# Patient Record
Sex: FEMALE | Race: WHITE | Employment: OTHER | URBAN - METROPOLITAN AREA
[De-identification: names, ages, dates, MRNs, and addresses within clinical notes are randomized per-mention and may not be internally consistent; named-entity substitution may affect disease eponyms.]

---

## 2013-03-01 LAB — COLONOSCOPY, EXTERNAL: NORMAL

## 2016-09-27 LAB — MAMMOGRAPHY, EXTERNAL: NORMAL

## 2016-11-17 LAB — AMB DEXA, EXTERNAL: NORMAL

## 2017-01-19 ENCOUNTER — HOSPITAL ENCOUNTER (OUTPATIENT)
Dept: MRI IMAGING | Age: 67
Discharge: HOME | End: 2017-01-19
Attending: ORTHOPAEDIC SURGERY
Payer: MEDICARE

## 2017-01-19 DIAGNOSIS — M75.101 ROTATOR CUFF SYNDROME OF RIGHT SHOULDER: ICD-10-CM

## 2017-01-19 PROCEDURE — 73221 MRI JOINT UPR EXTREM W/O DYE: CPT

## 2017-05-09 DIAGNOSIS — C80.1 CARCINOMA (HCC): ICD-10-CM

## 2017-05-09 DIAGNOSIS — Z90.49 HISTORY OF CHOLECYSTECTOMY: ICD-10-CM

## 2017-08-30 RX ORDER — LOSARTAN POTASSIUM 100 MG/1
TABLET ORAL
Qty: 90 TAB | Refills: 3 | Status: SHIPPED | OUTPATIENT
Start: 2017-08-30 | End: 2018-09-06 | Stop reason: SDUPTHER

## 2017-09-28 ENCOUNTER — HOSPITAL ENCOUNTER (OUTPATIENT)
Dept: MAMMOGRAPHY | Age: 67
Discharge: HOME | End: 2017-09-28
Attending: INTERNAL MEDICINE
Payer: MEDICARE

## 2017-09-28 DIAGNOSIS — Z12.31 VISIT FOR SCREENING MAMMOGRAM: ICD-10-CM

## 2017-09-28 PROCEDURE — 77067 SCR MAMMO BI INCL CAD: CPT

## 2017-10-18 PROBLEM — H65.02 ACUTE SEROUS OTITIS MEDIA OF LEFT EAR: Status: ACTIVE | Noted: 2017-10-18

## 2017-10-18 PROBLEM — N18.9 ANEMIA IN CHRONIC KIDNEY DISEASE: Status: ACTIVE | Noted: 2017-10-18

## 2017-10-18 PROBLEM — M81.0 OSTEOPOROSIS: Status: ACTIVE | Noted: 2017-10-18

## 2017-10-18 PROBLEM — R05.9 COUGH: Status: ACTIVE | Noted: 2017-10-18

## 2017-10-18 PROBLEM — Z90.49 HISTORY OF CHOLECYSTECTOMY: Status: ACTIVE | Noted: 2017-10-18

## 2017-10-18 PROBLEM — L02.229 BOIL OF TRUNK: Status: ACTIVE | Noted: 2017-10-18

## 2017-10-18 PROBLEM — L30.9 ECZEMA: Status: ACTIVE | Noted: 2017-10-18

## 2017-10-18 PROBLEM — R80.9 ABNORMAL PRESENCE OF PROTEIN IN URINE: Status: ACTIVE | Noted: 2017-10-18

## 2017-10-18 PROBLEM — I10 HTN (HYPERTENSION): Status: ACTIVE | Noted: 2017-10-18

## 2017-10-18 PROBLEM — C80.1 CARCINOMA (HCC): Status: ACTIVE | Noted: 2017-10-18

## 2017-10-18 PROBLEM — D63.1 ANEMIA IN CHRONIC KIDNEY DISEASE: Status: ACTIVE | Noted: 2017-10-18

## 2017-10-18 PROBLEM — Z00.00 ANNUAL PHYSICAL EXAM: Status: ACTIVE | Noted: 2017-10-18

## 2017-10-18 PROBLEM — N28.9 ABNORMAL KIDNEY FUNCTION: Status: ACTIVE | Noted: 2017-10-18

## 2017-10-18 PROBLEM — J40 BRONCHITIS: Status: ACTIVE | Noted: 2017-10-18

## 2017-10-18 PROBLEM — R07.9 CHEST PAIN, EXERTIONAL: Status: ACTIVE | Noted: 2017-10-18

## 2017-10-18 RX ORDER — CYCLOBENZAPRINE HCL 5 MG
5 TABLET ORAL
COMMUNITY
End: 2018-11-16

## 2017-10-18 RX ORDER — SILVER SULFADIAZINE 10 G/1000G
CREAM TOPICAL AS NEEDED
COMMUNITY
End: 2021-04-13

## 2017-10-18 RX ORDER — CEPHALEXIN 500 MG/1
500 CAPSULE ORAL 3 TIMES DAILY
COMMUNITY
End: 2018-11-16 | Stop reason: ALTCHOICE

## 2017-10-27 RX ORDER — LEVOTHYROXINE SODIUM 100 UG/1
TABLET ORAL
Qty: 90 TAB | Refills: 0 | Status: SHIPPED | OUTPATIENT
Start: 2017-10-27 | End: 2017-11-16 | Stop reason: ALTCHOICE

## 2017-11-09 ENCOUNTER — LAB ONLY (OUTPATIENT)
Dept: INTERNAL MEDICINE CLINIC | Age: 67
End: 2017-11-09

## 2017-11-09 DIAGNOSIS — Z00.00 ANNUAL PHYSICAL EXAM: Primary | ICD-10-CM

## 2017-11-09 DIAGNOSIS — Z20.5 EXPOSURE TO HEPATITIS C: ICD-10-CM

## 2017-11-09 LAB
ALBUMIN SERPL-MCNC: 4.5 G/DL (ref 3.9–5.4)
ALKALINE PHOS POC: 99 U/L (ref 38–126)
ALT SERPL-CCNC: 24 U/L (ref 9–52)
AST SERPL-CCNC: 22 U/L (ref 14–36)
BACTERIA UA POCT, BACTPOCT: NORMAL
BILIRUB UR QL STRIP: NEGATIVE
BUN BLD-MCNC: 28 MG/DL (ref 7–17)
CALCIUM BLD-MCNC: 10 MG/DL (ref 8.4–10.2)
CASTS UA POCT: 0
CHLORIDE BLD-SCNC: 105 MMOL/L (ref 98–107)
CHOLEST SERPL-MCNC: 132 MG/DL (ref 0–200)
CLUE CELLS, CLUEPOCT: NEGATIVE
CO2 POC: 30 MMOL/L (ref 22–32)
CREAT BLD-MCNC: 1 MG/DL (ref 0.7–1.2)
CRYSTALS UA POCT, CRYSPOCT: NEGATIVE
EGFR (POC): 58.2
EPITHELIAL CELLS POCT: NORMAL
GLUCOSE POC: 113 MG/DL (ref 65–105)
GLUCOSE UR-MCNC: NEGATIVE MG/DL
GRAN# POC: 4.2 K/UL (ref 2–7.8)
GRAN% POC: 67.8 % (ref 37–92)
HBA1C MFR BLD HPLC: 5.9 % (ref 4.5–5.7)
HCT VFR BLD CALC: 33.4 % (ref 37–51)
HDLC SERPL-MCNC: 42 MG/DL (ref 35–130)
HGB BLD-MCNC: 11.8 G/DL (ref 12–18)
KETONES P FAST UR STRIP-MCNC: NEGATIVE MG/DL
LDL CHOLESTEROL POC: 55.6 MG/DL (ref 0–130)
LY# POC: 1.7 K/UL (ref 0.6–4.1)
LY% POC: 28.5 % (ref 10–58.5)
MCH RBC QN: 31.9 PG (ref 26–32)
MCHC RBC-ENTMCNC: 35.2 G/DL (ref 30–36)
MCV RBC: 90 FL (ref 80–97)
MID #, POC: 0.2 K/UL (ref 0–1.8)
MID% POC: 3.7 % (ref 0.1–24)
MUCUS UA POCT, MUCPOCT: NORMAL
PH UR STRIP: 5 [PH] (ref 5–7)
PLATELET # BLD: 162 K/UL (ref 140–440)
POTASSIUM SERPL-SCNC: 4.3 MMOL/L (ref 3.6–5)
PROT SERPL-MCNC: 7.1 G/DL (ref 6.3–8.2)
PROT UR QL STRIP: NEGATIVE
RBC # BLD: 3.69 M/UL (ref 4.2–6.3)
RBC UA POCT, RBCPOCT: NORMAL
SODIUM SERPL-SCNC: 147 MMOL/L (ref 137–145)
SP GR UR STRIP: 1.01 (ref 1.01–1.02)
TCHOL/HDL RATIO (POC): 3.1 (ref 0–4)
TOTAL BILIRUBIN POC: 0.4 MG/DL (ref 0.2–1.3)
TRICH UA POCT, TRICHPOC: NEGATIVE
TRIGL SERPL-MCNC: 172 MG/DL (ref 0–200)
TSH BLD-ACNC: 0.22 UIU/ML (ref 0.4–4.2)
UA UROBILINOGEN AMB POC: NORMAL (ref 0.2–1)
URINALYSIS CLARITY POC: CLEAR
URINALYSIS COLOR POC: NORMAL
URINE BLOOD POC: NEGATIVE
URINE CULT COMMENT, POCT: NORMAL
URINE LEUKOCYTES POC: NORMAL
URINE NITRITES POC: NEGATIVE
VITAMIN D POC: 38.7 NG/ML (ref 30–96)
VLDLC SERPL CALC-MCNC: 34.4 MG/DL
WBC # BLD: 6.1 K/UL (ref 4.1–10.9)
WBC UA POCT, WBCPOCT: NORMAL
YEAST UA POCT, YEASTPOC: NEGATIVE

## 2017-11-10 LAB — HCV AB S/CO SERPL IA: <0.1 S/CO RATIO (ref 0–0.9)

## 2017-11-16 ENCOUNTER — OFFICE VISIT (OUTPATIENT)
Dept: INTERNAL MEDICINE CLINIC | Age: 67
End: 2017-11-16

## 2017-11-16 VITALS
DIASTOLIC BLOOD PRESSURE: 85 MMHG | SYSTOLIC BLOOD PRESSURE: 149 MMHG | HEIGHT: 60 IN | WEIGHT: 160 LBS | TEMPERATURE: 98.2 F | OXYGEN SATURATION: 98 % | HEART RATE: 64 BPM | BODY MASS INDEX: 31.41 KG/M2

## 2017-11-16 DIAGNOSIS — Z23 ENCOUNTER FOR IMMUNIZATION: Primary | ICD-10-CM

## 2017-11-16 RX ORDER — LEVOTHYROXINE SODIUM 88 UG/1
88 TABLET ORAL
Qty: 90 TAB | Refills: 3 | Status: SHIPPED | OUTPATIENT
Start: 2017-11-16 | End: 2018-11-26 | Stop reason: SDUPTHER

## 2017-11-16 NOTE — PATIENT INSTRUCTIONS
Vaccine Information Statement    Influenza (Flu) Vaccine (Live, Intranasal): What you need to know    Many Vaccine Information Statements are available in Khmer and other languages. See www.immunize.org/vis  Hojas de Información Sobre Vacunas están disponibles en Español y en muchos otros idiomas. Visite www.immunize.org/vis    1. Why get vaccinated? Influenza (flu) is a contagious disease that spreads around the [place] every year, usually between October and May. Flu is caused by influenza viruses, and is spread mainly by coughing, sneezing, and close contact. Anyone can get flu. Flu strikes suddenly and can last several days. Symptoms vary by age, but can include:   fever/chills   sore throat   muscle aches   fatigue   cough   headache    runny or stuffy nose    Flu can also lead to pneumonia and blood infections, and cause diarrhea and seizures in children. If you have a medical condition, such as heart or lung disease, flu can make it worse. Flu is more dangerous for some people. Infants and young children, people 72years of age and older, pregnant women, and people with certain health conditions or a weakened immune system are at greatest risk. Each year thousands of people in the [facility] die from flu, and many more are hospitalized. Flu vaccine can:   keep you from getting flu,   make flu less severe if you do get it, and   keep you from spreading flu to your family and other people. 2. Live, attenuated flu vaccine  LAIV, Nasal Anita    A dose of flu vaccine is recommended every flu season. Children younger than 5years of age may need two doses during the same flu season. Everyone else needs only one dose each flu season. The live, attenuated influenza vaccine (called LAIV) may be given to healthy, non-pregnant people 2 through 52years of age. It may safely be given at the same time as other vaccines. LAIV is sprayed into the nose.  LAIV does not contain thimerosal or other preservatives. It is made from weakened flu virus and does not cause flu. There are many flu viruses, and they are always changing. Each year LAIV is made to protect against four viruses that are likely to cause disease in the upcoming flu season. But even when the vaccine doesnt exactly match these viruses, it may still provide some protection. Flu vaccine cannot prevent:   flu that is caused by a virus not covered by the vaccine, or   illnesses that look like flu but are not. It takes about 2 weeks for protection to develop after vaccination, and protection lasts through the flu season. 3. Some people should not get this vaccine    Some people should not get LAIV because of age, health conditions, or other reasons. Most of these people should get an injected flu vaccine instead. Your healthcare provider can help you decide. Tell the provider if you or the person being vaccinated:   have any allergies, including an allergy to eggs, or have ever had an allergic reaction to an influenza vaccine.  have ever had Guillain-Barré Syndrome (also called GBS).  have any long-term heart, breathing, kidney, liver, or nervous system problems.  have asthma or breathing problems, or are a child who has had wheezing episodes.  are pregnant.  are a child or adolescent who is receiving aspirin or aspirin-containing products.  have a weakened immune system.  will be visiting or taking care of someone, within the next 7 days, who requires a protected environment (for example, following a bone marrow transplant)    Sometimes LAIV should be delayed. Tell the provider if you or the person being vaccinated:   are not feeling well. The vaccine could be delayed until you feel better.  have gotten any other vaccines in the past 4 weeks. Live vaccines given too close together might not work as well.  have taken influenza antiviral medication in the past 48 hours.    have a very stuffy nose. 4. Risks of a vaccine reaction    With any medicine, including vaccines, there is a chance of reactions. These are usually mild and go away on their own, but serious reactions are also possible. Most people who get LAIV do not have any problems with it. Reactions to LAIV may resemble a very mild case of flu. Problems that have been reported following LAIV:     Children and adolescents 317 years of age:   runny nose/nasal congestion   cough   fever   headache and muscle aches       wheezing    abdominal pain, vomiting, or diarrhea     Adults 2549 years of age:    runny nose/nasal congestion      sore throat   cough   chills   tiredness/weakness      headache    Problems that could happen after any vaccine:    Any medication can cause a severe allergic reaction. Such reactions from a vaccine are very rare, estimated at about 1 in a million doses, and would happen within a few minutes to a few hours after the vaccination. As with any medicine, there is a very small chance of a vaccine causing a serious injury or death. The safety of vaccines is always being monitored. For more information, visit:   www.cdc.gov/vaccinesafety/    5. What if there is a serious reaction? What should I look for?  Look for anything that concerns you, such as signs of a severe allergic reaction, very high fever, or unusual behavior. Signs of a severe allergic reaction can include hives, swelling of the face and throat, difficulty breathing, a fast heartbeat, dizziness, and weakness. These would start a few minutes to a few hours after the vaccination. What should I do?  If you think it is a severe allergic reaction or other emergency that cant wait, call 9-1-1 and get the person to the nearest hospital. Otherwise, call your doctor.  Reactions should be reported to the Vaccine Adverse Event Reporting System (VAERS).  Your doctor should file this report, or you can do it yourself through the VAERS web site at www.vaers. [facility].gov, or by calling 3-728.926.4217. VAERS does not give medical advice. 6. The National Vaccine Injury Compensation Program    The [facility] Vaccine Injury Compensation Program (VICP) is a federal program that was created to compensate people who may have been injured by certain vaccines. Persons who believe they may have been injured by a vaccine can learn about the program and about filing a claim by calling 3-726.699.3734 or visiting the [facility] website at www.[facility].gov/vaccinecompensation. There is a time limit to file a claim for compensation. 7. How can I learn more?  Ask your doctor. He or she can give you the vaccine package insert or suggest other sources of information.  Call your local or state health department.  Contact the Centers for Disease Control and Prevention (CDC):  - Call 5-375.501.7035 (1-800-CDC-INFO) or  - Visit CDCs website at www.cdc.gov/flu    Vaccine Information Statement   Live Attenuated Influenza Vaccine   8/7/2015  [address] [id number]    Department of Health and Human Services  Centers for Disease Control and Prevention    Office Use Only

## 2017-11-16 NOTE — PROGRESS NOTES
Ms. eDn Berry comes to the office today for a comprehensive medical evaluation. Past Medical History:  1. Hypertension. 2. Hypothyroidism, treated. 3. Type 2 diabetes. 4. Stage 3 CKD. 5. Obesity, status post sleeve gastrectomy in .  6. Status post bilateral cataract extractions, 2016, Dr. Charisse Tellez. 7. Arteriosclerotic heart disease. CT scan of the heart in  showed a coronary artery calcium score of 158.  8. Status post open cholecystectomy. 9. Status post colonoscopic polypectomy. 10. Nonallergic rhinitis. 11. Basal cell carcinoma of the tip of the nose, status post excision. 12. G2, P2, AB0. 13. Mixed hyperlipidemia. 14. Chronic low back pain with sciatica. 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 16. Sleep apnea, not treated. 17. Mild osteopenia. 18. History of renal calculi. 19. Mild normocytic, normochromic anemia. Current Medications:  1. Synthroid 0.1 mg daily. 2. Gabapentin 300 mg t.i.d.  3. Losartan 100 mg daily. 4. Crestor 20 mg daily. 5. Zyrtec 10 mg daily. 6. Flonase, two sprays daily. 7. Flexeril 5 mg prn.  8. Centrum Silver, one daily. 9. Citracal D, one daily. 10. Vitamin B12 1,000 mcg daily. 11. Vitamin D 5,000 units daily. Drug Allergies:    1. Sulfa. 2. She is intolerant of niacin. 3. Tricor caused abdominal pain. Social History:  She is a retired . She is . She helps her  with a [employer] business. She is a lifelong nonsmoker and non drinker. She has two grandchildren, age 1 and age 11, with autism and she helps provide  for them. Family History:  Her mother  at age 79 from a ruptured abdominal aortic aneurysm and lung cancer. Her father  of end stage renal disease and stroke at age 77. She has a brother who is alive and well. Review of Systems:  She is struggling hard to maintain her weight, but seems to be successful.   She is stressed from daily routine with her grandchildren. She otherwise feels well. She denies chest pain, chest pressure, chest tightness, shortness of breath, PND, orthopnea or ankle edema. Remainder of the ROS is negative. Physical Examination:  GENERAL:  HT: 5'.  WT: 160. BMI: 31.4. BP: 149/85 initially, subsequently 118/66. P: 64 and regular. O2 saturation on room air: 98%. HEENT:  Head normocephalic, atraumatic. Eyes - pupils midrange, equal directly and consensually. Extraocular movements are normal.  Ears, nose and throat are normal.  NECK:  Without JVD, adenopathy, thyromegaly or carotid bruits. LUNGS:  Clear. BREASTS:  Without masses. HEART:   Quiet precordium. Regular rate and rhythm. No audible murmurs or gallops. ABDOMEN:  Soft, non tender. No detectable hepatosplenomegaly, abdominal masses or bruits. EXTREMITIES:  Without edema. Pulses are normal.  Sensation to fine touch is normal.  SKIN:  No suspicious lesions. NEUROLOGICAL:  Cranial nerves II-XII are intact. Strength, gait and mental status are normal for age. Studies:  PFT is normal.  Resting EKG within normal limits. Hearing testing normal.    Laboratory:  Hemoglobin is slightly low at 11.8. WBC and platelet count are normal.  Blood sugar 113, Hgb A1c 5.9. Potassium, renal function, calcium level and LFTs normal.  UA normal.  TSH 0.22. Vitamin D level 39. Hep C antibody negative. Impression:  1. Hypertension, good control. 2. Type 2 diabetes, currently diet controlled and basically resolved with sleeve gastrectomy. 3. Hypothyroidism, treated. 4. Stage 3 CKD. 5. Status post bilateral cataract extractions. 6. Arteriosclerotic heart disease as outlined. 7. Status post cholecystectomy. 8. Status post colonoscopic polypectomy. 9. Sleep apnea. 10. Mild normocytic normochromic anemia. Plan:  1. Flu vaccine given today. 2. Tetanus, whooping cough, Pneumovax 23 and Prevnar 13 and shingles vaccines are current.   3. Mammogram was normal in September. 4. She had a stress test in December, 2015 prior to her surgery. 5. I'd like her to have her CT heart scan done again next year. 6. Next colonoscopy due in 2018. 7. Next bone density test in November, 2018. 8. Eye exam is current. 9. I have asked her to return in six months for recheck. 10. Lowered her dose of Synthroid to 0.088 mg daily.

## 2017-11-16 NOTE — PROGRESS NOTES
Reviewed record in preparation for visit and have obtained necessary documentation. Identified pt with two pt identifiers(name and ). Chief Complaint   Patient presents with    Annual Exam        Coordination of Care Questionnaire:  :   o   1) Have you been to an emergency room, urgent care clinic since your last visit? No     Hospitalized since your last visit? No         2) Have you seen or consulted any other health care providers outside of [address] [place] [address] since your last visit?   Dr. Adriana Jennings

## 2017-11-16 NOTE — PROGRESS NOTES
Sam Sarmiento  presented to office today and received an injection of Fluzone  per Dr. Etta Simms orders. Pt was given 0.5 mL of High Dose IM in the Lt Deltoid without incident. Pt waited and no adverse reaction was observed.

## 2017-11-16 NOTE — LETTER
11/16/2017 12:45 PM 
 
Ms. Ean Fair [address]  [address] [id number] Dear Werner Greenberg: 
 
I am writing this letter as a follow-up to your recent physical examination. I have enclosed copies of your lab work for your review. After going over this information, if you have any questions please give me a call. Your current active and past medical problems include: 1. Hypertension. 2. Hypothyroidism. 3. Type 2 diabetes, which is currently diet controlled. 4. You have lost 80 pounds since you had a sleeve gastrectomy in March, 2016. 
5. Arteriosclerotic heart disease. CT scan of the heart done in November, 2013 showed a coronary artery calcium score of 158, indicating moderate nonobstructive coronary artery plaque. You had a normal stress test in December, 2015, just prior to your surgery. I would like to repeat the CT scan of your heart next November at your fifth year anniversary. 6. You have had your gallbladder removed. 7. You have had colon polyps. 8. You had a basal cell skin cancer removed from the tip of your nose. 9. Hypercholesterolemia. 10. You have had bilateral cataract extractions. 11. Sleep apnea. 12. Mild anemia, cause undetermined. 15. You had a right rotator cuff repair in June of this year. Your current medications are: 1. Synthroid 0.088 mg daily. 2. Gabapentin 300 mg three times daily. 3. Losartan 100 mg daily. 4. Crestor 20 mg daily. 5. Flonase, two sprays daily. 6. Zyrtec 10 mg daily. 7. Flexeril 5 mg as needed for back pain. 8. Centrum Silver, one daily. 9. Citracal D, one daily. 10. Vitamin B12 1,000 mcg daily. 11. Vitamin D 5,000 units daily. On physical examination, your height was 5'. Your weight was 160. BMI 31.4. Your blood pressure was 118/66. Your pulse was 64 and regular. Your physical examination was entirely normal.  You have only gained 4 pounds since your checkup a year ago.   Your resting electrocardiogram, pulmonary function testing and hearing testing were normal.  
 
You have a mild anemia. Your hemoglobin is 11.8, normal is 12-18. This is about the same as it was a year ago. Platelet count and white blood count are normal.  Your blood sugar is 113 and hemoglobin A1c is 5.9. That is excellent. Your creatinine, which is a measure of kidney function, is now normal at 1.0. Potassium, calcium level and liver function studies were normal.   Total cholesterol was 132, triglycerides were 172, LDL (bad cholesterol) was 56 and HDL (good cholesterol) was 42. That is a perfect lipid profile. TSH, which is a measure of thyroid function, is a little bit low at 0.22, indicating your current replacement dose is too strong and I have made an adjustment. Vitamin D level is normal at 39. We checked you for the hepatitis C virus and that was negative. Your urinalysis is normal. 
 
We updated your influenza vaccine. You have had Pneumovax 23, Prevnar 13, tetanus, whooping cough and shingles. Your eye exam is current. You had a normal mammogram in September. Your colonoscopy is due in March, 2018. You had a relatively normal bone density test last November. We will repeat that again in 2-3 years. I cannot tell you how happy I am with the progress you have made over the last 18 months. You are to be congratulated. I look forward to seeing you again in six months or sooner should the need arise. Sincerely, Cristy Castellanos MD

## 2017-11-24 ENCOUNTER — TELEPHONE (OUTPATIENT)
Dept: INTERNAL MEDICINE CLINIC | Age: 67
End: 2017-11-24

## 2017-11-24 RX ORDER — AZITHROMYCIN 250 MG/1
250 TABLET, FILM COATED ORAL SEE ADMIN INSTRUCTIONS
Qty: 6 TAB | Refills: 0 | Status: SHIPPED | OUTPATIENT
Start: 2017-11-24 | End: 2017-11-29

## 2017-11-24 NOTE — TELEPHONE ENCOUNTER
Patient's  called stating that Ms Megan Malone is having congestion and a cough and no fever. Per Dr Danny Cortez patient advised that he will send a Lenor Katz and she can take OTC Robitussin DM. Rx sent to [address]

## 2018-01-29 DIAGNOSIS — Z20.828 EXPOSURE TO INFLUENZA: Primary | ICD-10-CM

## 2018-01-29 RX ORDER — OSELTAMIVIR PHOSPHATE 75 MG/1
75 CAPSULE ORAL DAILY
Qty: 10 CAP | Refills: 0 | Status: SHIPPED | OUTPATIENT
Start: 2018-01-29 | End: 2018-02-03

## 2018-03-12 DIAGNOSIS — J01.90 ACUTE NON-RECURRENT SINUSITIS, UNSPECIFIED LOCATION: Primary | ICD-10-CM

## 2018-03-12 RX ORDER — AMOXICILLIN AND CLAVULANATE POTASSIUM 500; 125 MG/1; MG/1
1 TABLET, FILM COATED ORAL 2 TIMES DAILY
Qty: 20 TAB | Refills: 0 | Status: SHIPPED | OUTPATIENT
Start: 2018-03-12 | End: 2018-05-17 | Stop reason: ALTCHOICE

## 2018-03-30 ENCOUNTER — OFFICE VISIT (OUTPATIENT)
Dept: INTERNAL MEDICINE CLINIC | Age: 68
End: 2018-03-30

## 2018-03-30 VITALS
SYSTOLIC BLOOD PRESSURE: 152 MMHG | OXYGEN SATURATION: 95 % | BODY MASS INDEX: 31.84 KG/M2 | TEMPERATURE: 98.2 F | WEIGHT: 162.2 LBS | HEIGHT: 60 IN | HEART RATE: 75 BPM | DIASTOLIC BLOOD PRESSURE: 83 MMHG

## 2018-03-30 DIAGNOSIS — J01.41 ACUTE RECURRENT PANSINUSITIS: Primary | ICD-10-CM

## 2018-03-30 DIAGNOSIS — H10.31 ACUTE BACTERIAL CONJUNCTIVITIS OF RIGHT EYE: ICD-10-CM

## 2018-03-30 PROBLEM — J01.40 ACUTE PANSINUSITIS: Status: ACTIVE | Noted: 2018-03-30

## 2018-03-30 PROBLEM — H10.30 ACUTE BACTERIAL CONJUNCTIVITIS: Status: ACTIVE | Noted: 2018-03-30

## 2018-03-30 RX ORDER — CIPROFLOXACIN HYDROCHLORIDE 3.5 MG/ML
2 SOLUTION/ DROPS TOPICAL 4 TIMES DAILY
Qty: 10 ML | Refills: 0 | Status: SHIPPED | OUTPATIENT
Start: 2018-03-30 | End: 2018-11-16 | Stop reason: ALTCHOICE

## 2018-03-30 RX ORDER — LEVOFLOXACIN 750 MG/1
750 TABLET ORAL DAILY
Qty: 5 TAB | Refills: 0 | Status: SHIPPED | OUTPATIENT
Start: 2018-03-30 | End: 2018-11-16 | Stop reason: ALTCHOICE

## 2018-03-30 NOTE — PROGRESS NOTES
Reviewed record in preparation for visit and have obtained necessary documentation. Identified pt with two pt identifiers(name and ). Chief Complaint   Patient presents with    Sinus Infection    Cough        Coordination of Care Questionnaire:  :     1) Have you been to an emergency room, urgent care clinic since your last visit? No     Hospitalized since your last visit? No               2) Have you seen or consulted any other health care providers outside of [address] [place] [address] since your last visit? Yes Dr Jose Rafael Bell, Dr Nancy Alba, and Dr Addis Yeboah.

## 2018-03-30 NOTE — PROGRESS NOTES
Subjective:  Abby Lilly comes to the office today complaining of cough, sinus drainage and irritation in her right eye. Her grandchild also has  pink eye. Her cough is productive. She hasn't had fever or chills. I phoned in a rx for Augmentin on March 12th. She said that helped with the same symptoms, but now they have recurred. Physical Examination:  GENERAL:  T: 98.2. BP: 152/83 initially, subsequently 130/86. P: 75 and regular. O2 sat on room air: 95%. HEENT:  Ears are clear. Right eye reveals purulent conjunctivitis. Nose - swollen turbinates, mucopurulent drainage. Throat is normal.  NECK:  Without adenopathy or stridor. CHEST:  Lungs clear. CARDIAC:  Heart regular rhythm without murmurs or gallops. Impression:  1. Recurrent sinusitis  2. Bacterial conjunctivitis. Plan:  1. Try Levaquin 750, (#5), one daily. 2. Saline nasal spray to the nose q.i.d.  3. She has some cough syrup. 4. Follow up if unimproved, otherwise as previously scheduled.

## 2018-04-10 ENCOUNTER — ANESTHESIA (OUTPATIENT)
Dept: ENDOSCOPY | Age: 68
End: 2018-04-10
Payer: MEDICARE

## 2018-04-10 ENCOUNTER — HOSPITAL ENCOUNTER (OUTPATIENT)
Age: 68
Setting detail: OUTPATIENT SURGERY
Discharge: HOME | End: 2018-04-10
Attending: COLON & RECTAL SURGERY | Admitting: COLON & RECTAL SURGERY
Payer: MEDICARE

## 2018-04-10 ENCOUNTER — ANESTHESIA EVENT (OUTPATIENT)
Dept: ENDOSCOPY | Age: 68
End: 2018-04-10
Payer: MEDICARE

## 2018-04-10 VITALS
TEMPERATURE: 98 F | WEIGHT: 161.44 LBS | RESPIRATION RATE: 7 BRPM | SYSTOLIC BLOOD PRESSURE: 120 MMHG | HEART RATE: 58 BPM | OXYGEN SATURATION: 91 % | BODY MASS INDEX: 29.71 KG/M2 | DIASTOLIC BLOOD PRESSURE: 68 MMHG | HEIGHT: 62 IN

## 2018-04-10 PROCEDURE — 74011250636 HC RX REV CODE- 250/636

## 2018-04-10 PROCEDURE — 76040000019: Performed by: COLON & RECTAL SURGERY

## 2018-04-10 PROCEDURE — 74011250636 HC RX REV CODE- 250/636: Performed by: COLON & RECTAL SURGERY

## 2018-04-10 PROCEDURE — 74011000250 HC RX REV CODE- 250

## 2018-04-10 PROCEDURE — 76060000031 HC ANESTHESIA FIRST 0.5 HR: Performed by: COLON & RECTAL SURGERY

## 2018-04-10 RX ORDER — NALOXONE HYDROCHLORIDE 0.4 MG/ML
0.4 INJECTION, SOLUTION INTRAMUSCULAR; INTRAVENOUS; SUBCUTANEOUS
Status: DISCONTINUED | OUTPATIENT
Start: 2018-04-10 | End: 2018-04-10 | Stop reason: HOSPADM

## 2018-04-10 RX ORDER — MIDAZOLAM HYDROCHLORIDE 1 MG/ML
1-2 INJECTION, SOLUTION INTRAMUSCULAR; INTRAVENOUS
Status: DISCONTINUED | OUTPATIENT
Start: 2018-04-10 | End: 2018-04-10 | Stop reason: HOSPADM

## 2018-04-10 RX ORDER — FLUMAZENIL 0.1 MG/ML
0.2 INJECTION INTRAVENOUS
Status: DISCONTINUED | OUTPATIENT
Start: 2018-04-10 | End: 2018-04-10 | Stop reason: HOSPADM

## 2018-04-10 RX ORDER — MICONAZOLE NITRATE 2 %
CREAM WITH APPLICATOR VAGINAL 2 TIMES DAILY
COMMUNITY

## 2018-04-10 RX ORDER — SODIUM CHLORIDE 9 MG/ML
50 INJECTION, SOLUTION INTRAVENOUS CONTINUOUS
Status: DISCONTINUED | OUTPATIENT
Start: 2018-04-10 | End: 2018-04-10 | Stop reason: HOSPADM

## 2018-04-10 RX ORDER — ATROPINE SULFATE 0.1 MG/ML
0.5 INJECTION INTRAVENOUS
Status: DISCONTINUED | OUTPATIENT
Start: 2018-04-10 | End: 2018-04-10 | Stop reason: HOSPADM

## 2018-04-10 RX ORDER — DEXTROMETHORPHAN/PSEUDOEPHED 2.5-7.5/.8
1.2 DROPS ORAL
Status: DISCONTINUED | OUTPATIENT
Start: 2018-04-10 | End: 2018-04-10 | Stop reason: HOSPADM

## 2018-04-10 RX ORDER — SODIUM CHLORIDE 0.9 % (FLUSH) 0.9 %
5-10 SYRINGE (ML) INJECTION AS NEEDED
Status: DISCONTINUED | OUTPATIENT
Start: 2018-04-10 | End: 2018-04-10 | Stop reason: HOSPADM

## 2018-04-10 RX ORDER — PROPOFOL 10 MG/ML
INJECTION, EMULSION INTRAVENOUS AS NEEDED
Status: DISCONTINUED | OUTPATIENT
Start: 2018-04-10 | End: 2018-04-10 | Stop reason: HOSPADM

## 2018-04-10 RX ORDER — EPINEPHRINE 0.1 MG/ML
1 INJECTION INTRACARDIAC; INTRAVENOUS
Status: DISCONTINUED | OUTPATIENT
Start: 2018-04-10 | End: 2018-04-10 | Stop reason: HOSPADM

## 2018-04-10 RX ORDER — FENTANYL CITRATE 50 UG/ML
25 INJECTION, SOLUTION INTRAMUSCULAR; INTRAVENOUS
Status: DISCONTINUED | OUTPATIENT
Start: 2018-04-10 | End: 2018-04-10 | Stop reason: HOSPADM

## 2018-04-10 RX ORDER — SODIUM CHLORIDE 0.9 % (FLUSH) 0.9 %
5-10 SYRINGE (ML) INJECTION EVERY 8 HOURS
Status: DISCONTINUED | OUTPATIENT
Start: 2018-04-10 | End: 2018-04-10 | Stop reason: HOSPADM

## 2018-04-10 RX ORDER — LIDOCAINE HYDROCHLORIDE 20 MG/ML
INJECTION, SOLUTION EPIDURAL; INFILTRATION; INTRACAUDAL; PERINEURAL AS NEEDED
Status: DISCONTINUED | OUTPATIENT
Start: 2018-04-10 | End: 2018-04-10 | Stop reason: HOSPADM

## 2018-04-10 RX ADMIN — LIDOCAINE HYDROCHLORIDE 50 MG: 20 INJECTION, SOLUTION EPIDURAL; INFILTRATION; INTRACAUDAL; PERINEURAL at 13:33

## 2018-04-10 RX ADMIN — PROPOFOL 250 MG: 10 INJECTION, EMULSION INTRAVENOUS at 13:52

## 2018-04-10 RX ADMIN — SODIUM CHLORIDE 50 ML/HR: 900 INJECTION, SOLUTION INTRAVENOUS at 13:03

## 2018-04-10 NOTE — IP AVS SNAPSHOT
[address] [facility] 
[phone] Patient: Justine Florian MRN: [id number] About your hospitalization You were admitted on:  April 10, 2018 You last received care in the:  [facility] ENDOSCOPY You were discharged on:  April 10, 2018 Why you were hospitalized Your primary diagnosis was:  Not on File Follow-up Information Follow up With Details Comments Contact Info MD Lorenzo Cannon [address] [facility] 
[phone] Your Scheduled Appointments Tuesday May 15, 2018  9:00 AM EDT FOLLOW UP 10 with MD KALEN Cannon [facility] (Arthur Patel) Lorenzo [address] [address] [phone] Discharge Orders None A check elza indicates which time of day the medication should be taken. My Medications CONTINUE taking these medications Instructions Each Dose to Equal  
 Morning Noon Evening Bedtime ACCU-CHEK LEILA PLUS TEST STRP strip Generic drug:  glucose blood VI test strips Your last dose was: Your next dose is:    
   
   
 USE TO TEST TWICE DAILY  
     
   
   
   
  
 amoxicillin-clavulanate 500-125 mg per tablet Commonly known as:  AUGMENTIN Your last dose was: Your next dose is: Take 1 Tab by mouth two (2) times a day. 1 Tab  
    
   
   
   
  
 atenolol 25 mg tablet Commonly known as:  TENORMIN Your last dose was: Your next dose is: TAKE 1 TABLET DAILY Biotin 2,500 mcg Cap Your last dose was: Your next dose is: Take  by mouth. CALTRATE PLUS PO Your last dose was: Your next dose is: Take  by mouth daily.   
     
   
   
   
  
 CENTRUM COMPLETE PO  
 Your last dose was: Your next dose is: Take  by mouth daily. cholecalciferol (VITAMIN D3) 5,000 unit Tab tablet Commonly known as:  VITAMIN D3 Your last dose was: Your next dose is: Take  by mouth daily. ciprofloxacin HCl 0.3 % ophthalmic solution Commonly known as:  Flex Zimmer Your last dose was: Your next dose is:    
   
   
 Administer 2 Drops to right eye four (4) times daily. 2 Drop Citracal + D tablet Generic drug:  calcium citrate-vitamin D3 Your last dose was: Your next dose is: Take  by mouth two (2) times a day. cyclobenzaprine 5 mg tablet Commonly known as:  FLEXERIL Your last dose was: Your next dose is: Take 5 mg by mouth three (3) times daily as needed for Muscle Spasm(s). 5 mg EVOXAC 30 mg capsule Generic drug:  cevimeline Your last dose was: Your next dose is: Take 30 mg by mouth daily. 30 mg  
    
   
   
   
  
 FLONASE 50 mcg/actuation nasal spray Generic drug:  fluticasone Your last dose was: Your next dose is:    
   
   
 nightly.  
     
   
   
   
  
 gabapentin 300 mg capsule Commonly known as:  NEURONTIN Your last dose was: Your next dose is: Take 300 mg by mouth nightly. 300 mg HumaLOG Mix 75-25(U-100)Insuln 100 unit/mL (75-25) injection Generic drug:  insulin lispro protamine/insulin lispro Your last dose was: Your next dose is:    
   
   
 by SubCUTAneous route Before breakfast and dinner. KEFLEX 500 mg capsule Generic drug:  cephALEXin Your last dose was: Your next dose is: Take 500 mg by mouth three (3) times daily.   
 500 mg  
    
   
   
   
  
 levoFLOXacin 750 mg tablet Commonly known as:  Doyle Myers Your last dose was: Your next dose is: Take 1 Tab by mouth daily. 750 mg  
    
   
   
   
  
 levothyroxine 88 mcg tablet Commonly known as:  SYNTHROID Your last dose was: Your next dose is: Take 1 Tab by mouth Daily (before breakfast). 88 mcg  
    
   
   
   
  
 losartan 100 mg tablet Commonly known as:  COZAAR Your last dose was: Your next dose is: TAKE 1 TABLET DAILY PROBIOTIC 4X 10-15 mg Tbec Generic drug:  B.infantis-B.ani-B.long-B.bifi Your last dose was: Your next dose is: Take  by mouth. rosuvastatin 20 mg tablet Commonly known as:  CRESTOR Your last dose was: Your next dose is: TAKE 1 TABLET DAILY SILVADENE 1 % topical cream  
Generic drug:  silver sulfADIAZINE Your last dose was: Your next dose is:    
   
   
 Apply  to affected area daily. VITAMIN B-12 PO Your last dose was: Your next dose is: Take 1,000 mcg by mouth daily. 1000 mcg ZYRTEC PO Your last dose was: Your next dose is: Take  by mouth daily. Discharge Instructions Ethan Varela [id number] 
1950 COLON DISCHARGE INSTRUCTIONS Discomfort: 
Redness at IV site- apply warm compress to area; if redness or soreness persist- contact your physician There may be a slight amount of blood passed from the rectum Gaseous discomfort- walking, belching will help relieve any discomfort You may not operate a vehicle for 12 hours You may not engage in an occupation involving machinery or appliances for rest of today You may not drink alcoholic beverages for at least 12 hours Avoid making any critical decisions for at least 24 hour DIET: 
 High fiber diet.  however -  remember your colon is empty and a heavy meal will produce gas. Avoid these foods:  vegetables, fried / greasy foods, carbonated drinks for today MEDICATIONS: 
resume ACTIVITY: 
You may resume your normal daily activities it is recommended that you spend the remainder of the day resting -  avoid any strenuous activity. CALL M.D. ANY SIGN OF: Increasing pain, nausea, vomiting Abdominal distension (swelling) New increased bleeding (oral or rectal) Fever (chills) Follow-up Instructions: 
 Call Madeline Massey MD if any questions or problems. Telephone # [phone] Should have a repeat colonoscopy in 5 years. COLONOSCOPY FINDINGS: 
Your colonoscopy showed: sigmoid diverticulosis. [email or web address] Activation Thank you for requesting access to [email or web address]. Please follow the instructions below to securely access and download your online medical record. [email or web address] allows you to send messages to your doctor, view your test results, renew your prescriptions, schedule appointments, and more. How Do I Sign Up? 1. In your internet browser, go to [email or web address] 
2. Click on the First Time User? Click Here link in the Sign In box. You will be redirect to the New Member Sign Up page. 3. Enter your HydroBuilder.com Access Code exactly as it appears below. You will not need to use this code after youve completed the sign-up process. If you do not sign up before the expiration date, you must request a new code. [email or web address].com Access Code: Activation code not generated Current [email or web address] Status: Active (This is the date your HydroBuilder.com access code will ) 4. Enter the last four digits of your Social Security Number (xxxx) and Date of Birth (mm/dd/yyyy) as indicated and click Submit. You will be taken to the next sign-up page. 5. Create a HydroBuilder.com ID.  This will be your HydroBuilder.com login ID and cannot be changed, so think of one that is secure and easy to remember. 6. Create a Klick2Contact password. You can change your password at any time. 7. Enter your Password Reset Question and Answer. This can be used at a later time if you forget your password. 8. Enter your e-mail address. You will receive e-mail notification when new information is available in [address] 9. Click Sign Up. You can now view and download portions of your medical record. 10. Click the Download Summary menu link to download a portable copy of your medical information. Additional Information If you have questions, please visit the Frequently Asked Questions section of the Klick2Contact website at [email or web address] Remember, Klick2Contact is NOT to be used for urgent needs. For medical emergencies, dial 911. ACO Transitions of Care [facility] offers a voluntary care coordination program to provide high quality service and care to [facility] fee-for-service beneficiaries. Alma Rodriguez was designed to help you enhance your health and well-being through the following services: ? Transitions of Care  support for individuals who are transitioning from one care setting to another (example: Hospital to home). ? Chronic and Complex Care Coordination  support for individuals and caregivers of those with serious or chronic illnesses or with more than one chronic (ongoing) condition and those who take a number of different medications. If you meet specific medical criteria, a [facility] [address] may call you directly to coordinate your care with your primary care physician and your other care providers. For questions about the [facility] programs, please, contact your physicians office. For general questions or additional information about Accountable Care Organizations: Please visit www.medicare.gov/acos. html or call 1-800-MEDICARE (6-217.856.1176) TTY users should call 8-841.738.4854. Introducing [facility] & HEALTH SERVICES! Dear Shilpa Pennington: 
Thank you for requesting a ClairMail account. Our records indicate that you already have an active ClairMail account. You can access your account anytime at [email or web address] Did you know that you can access your hospital and ER discharge instructions at any time in ClairMail? You can also review all of your test results from your hospital stay or ER visit. Additional Information If you have questions, please visit the Frequently Asked Questions section of the ClairMail website at [email or web address] Remember, ClairMail is NOT to be used for urgent needs. For medical emergencies, dial 911. Now available from your iPhone and Android! Introducing Alexis Hickman As a [employer] patient, I wanted to make you aware of our electronic visit tool called Alexis Hickman. [employer] 24/7 allows you to connect within minutes with a medical provider 24 hours a day, seven days a week via a mobile device or tablet or logging into a secure website from your computer. You can access Alexis Hickman from anywhere in the [place]. A virtual visit might be right for you when you have a simple condition and feel like you just dont want to get out of bed, or cant get away from work for an appointment, when your regular [employer] provider is not available (evenings, weekends or holidays), or when youre out of town and need minor care. Electronic visits cost only $49 and if the [employer] 24/7 provider determines a prescription is needed to treat your condition, one can be electronically transmitted to a nearby pharmacy*. Please take a moment to enroll today if you have not already done so. The enrollment process is free and takes just a few minutes.   To enroll, please download the [employer] 24/7 solo to your tablet or phone, or visit [email or web address] to enroll on your computer. And, as an [address] [place] [address] patient with a [employer] account, the results of your visits will be scanned into your electronic medical record and your primary care provider will be able to view the scanned results. We urge you to continue to see your regular [employer] provider for your ongoing medical care. And while your primary care provider may not be the one available when you seek a [employer] virtual visit, the peace of mind you get from getting a real diagnosis real time can be priceless. For more information on [employer], view our Frequently Asked Questions (FAQs) at [email or web address]. Sincerely, 
 
Shireen Woodson MD 
Chief Medical Officer Caterina Mindy Cordon *:  certain medications cannot be prescribed via [employer] Providers Seen During Your Hospitalization Provider Specialty Primary office phone Manuela Marie MD Colon and Rectal Surgery [phone] Your Primary Care Physician (PCP) Primary Care Physician Office Phone Office Fax Amos Choe [phone] You are allergic to the following Allergen Reactions Niacin Other (comments) Skin irritation Sulfa (Sulfonamide Antibiotics) Unknown (comments) Tricor (Fenofibrate Micronized) Swelling Recent Documentation Height Weight BMI Smoking Status 1.562 m 73.2 kg 30.01 kg/m2 Never Smoker Emergency Contacts Name Discharge Info Relation Home Work Mobile [employer] DISCHARGE CAREGIVER [3] Spouse [3] [phone] Patient Belongings The following personal items are in your possession at time of discharge: 
  Dental Appliances: None  Visual Aid: Glasses Please provide this summary of care documentation to your next provider. Signatures-by signing, you are acknowledging that this After Visit Summary has been reviewed with you and you have received a copy. Patient Signature:  ____________________________________________________________ Date:  ____________________________________________________________  
  
Genoa Brake Provider Signature:  ____________________________________________________________ Date:  ____________________________________________________________

## 2018-04-10 NOTE — BRIEF OP NOTE
BRIEF OPERATIVE NOTE    Date of Procedure: 4/10/2018   Preoperative Diagnosis: HX POLYPS  Postoperative Diagnosis: * No post-op diagnosis entered *    Procedure(s):  COLONOSCOPY  Surgeon(s) and Role:     * Nury Lara MD - Primary         Assistant Staff: None      Surgical Staff:  Endoscopy Technician-1: Messi Montero  Endoscopy RN-1: Chitra Tuttle RN  No case tracking events are documented in the log.   Anesthesia: MAC   Estimated Blood Loss: none  Specimens: * No specimens in log *   Findings: sigmoid diverticulosis   Complications: none apparent  Implants: * No implants in log *

## 2018-04-10 NOTE — PROGRESS NOTES
Anesthesia reports 250mg Propofol, 50mg Lidocaine and 200mL NS given during procedure. Received report from anesthesia staff on vital signs and status of patient.     Endoscope was pre-cleaned at the bedside immediately following procedure by Brisa Wagner

## 2018-04-10 NOTE — DISCHARGE INSTRUCTIONS
Charlotte Bowie  [id number]  1950    COLON DISCHARGE INSTRUCTIONS  Discomfort:  Redness at IV site- apply warm compress to area; if redness or soreness persist- contact your physician  There may be a slight amount of blood passed from the rectum  Gaseous discomfort- walking, belching will help relieve any discomfort  You may not operate a vehicle for 12 hours  You may not engage in an occupation involving machinery or appliances for rest of today  You may not drink alcoholic beverages for at least 12 hours  Avoid making any critical decisions for at least 24 hour  DIET:   High fiber diet. - however -  remember your colon is empty and a heavy meal will produce gas. Avoid these foods:  vegetables, fried / greasy foods, carbonated drinks for today    MEDICATIONS:  resume       ACTIVITY:  You may resume your normal daily activities it is recommended that you spend the remainder of the day resting -  avoid any strenuous activity. CALL M.D. ANY SIGN OF:   Increasing pain, nausea, vomiting  Abdominal distension (swelling)  New increased bleeding (oral or rectal)  Fever (chills)     Follow-up Instructions:   Call Brigid Plummer MD if any questions or problems. Telephone # [phone]  Should have a repeat colonoscopy in 5 years. COLONOSCOPY FINDINGS:  Your colonoscopy showed: sigmoid diverticulosis. StartMe Activation    Thank you for requesting access to StartMe. Please follow the instructions below to securely access and download your online medical record. StartMe allows you to send messages to your doctor, view your test results, renew your prescriptions, schedule appointments, and more. How Do I Sign Up? 1. In your internet browser, go to [email or web address]  2. Click on the First Time User? Click Here link in the Sign In box. You will be redirect to the New Member Sign Up page. 3. Enter your StartMe Access Code exactly as it appears below.  You will not need to use this code after youve completed the sign-up process. If you do not sign up before the expiration date, you must request a new code. Bit9 Access Code: Activation code not generated  Current Bit9 Status: Active (This is the date your Bit9 access code will )    4. Enter the last four digits of your Social Security Number (xxxx) and Date of Birth (mm/dd/yyyy) as indicated and click Submit. You will be taken to the next sign-up page. 5. Create a BlackBamboozStudiot ID. This will be your Bit9 login ID and cannot be changed, so think of one that is secure and easy to remember. 6. Create a Bit9 password. You can change your password at any time. 7. Enter your Password Reset Question and Answer. This can be used at a later time if you forget your password. 8. Enter your e-mail address. You will receive e-mail notification when new information is available in [address] 9. Click Sign Up. You can now view and download portions of your medical record. 10. Click the Download Summary menu link to download a portable copy of your medical information. Additional Information    If you have questions, please visit the Frequently Asked Questions section of the Bit9 website at [email or web address] Remember, Bit9 is NOT to be used for urgent needs. For medical emergencies, dial 911.

## 2018-04-10 NOTE — H&P
Colon and Rectal Surgery History and Physical    Subjective:      Keisha Galan is a 79 y.o. female who has hx genna 2013    Patient Active Problem List    Diagnosis Date Noted    Acute pansinusitis 03/30/2018    Acute bacterial conjunctivitis 03/30/2018    Abnormal kidney function 10/18/2017    Abnormal presence of protein in urine 10/18/2017    Acute serous otitis media of left ear 10/18/2017    Anemia in chronic kidney disease 10/18/2017    Annual physical exam 10/18/2017    Boil of trunk 10/18/2017    Bronchitis 10/18/2017    Carcinoma ([address] 10/18/2017    Chest pain, exertional 10/18/2017    Cough 10/18/2017    Eczema 10/18/2017    History of cholecystectomy 10/18/2017    HTN (hypertension) 10/18/2017    Osteoporosis 10/18/2017    Type II diabetes mellitus, uncontrolled ([address]     Hyperlipidemia LDL goal <100     Essential hypertension, benign     Acquired hypothyroidism     Vitamin D deficiency      Past Medical History:   Diagnosis Date    Abnormal kidney function 10/18/2017    Abnormal presence of protein in urine 10/18/2017    Acute serous otitis media of left ear 10/18/2017    Comments: recurrence not specified    Allergic rhinitis     Anemia     Anemia in chronic kidney disease 10/18/2017    Annual physical exam 10/18/2017    Back pain     Basal cell carcinoma     of tip of nose    Boil of trunk 10/18/2017    Bronchitis 10/18/2017    Carcinoma ([address] 10/18/2017    Comments: basal cell nose s/p excision    Chest pain, exertional 10/18/2017    Cough 10/18/2017    Dry mouth     Eczema 10/18/2017    Comments: intrinsic     History of cholecystectomy 10/18/2017    Comments: open 1989    HTN (hypertension) 10/18/2017    Kidney stone     LUIS FERNANDO (obstructive sleep apnea)     Osteopenia     Osteoporosis 10/18/2017    Other and unspecified hyperlipidemia     Pigmentary retinopathy     Type II or unspecified type diabetes mellitus without mention of complication, uncontrolled  Unspecified essential hypertension     Unspecified hypothyroidism     Unspecified vitamin D deficiency       Past Surgical History:   Procedure Laterality Date    DELIVERY       x2    HX CHOLECYSTECTOMY      HX OTHER SURGICAL  3/10/16    gastric sleeve    HX POLYPECTOMY      HX TONSILLECTOMY        Social History   Substance Use Topics    Smoking status: Never Smoker    Smokeless tobacco: Never Used    Alcohol use Yes      Comment: maybe a few times a year at most      Family History   Problem Relation Age of Onset    Diabetes Father     Stroke Father     Cancer Mother      lung    Other Mother      aortic aneurysm      Prior to Admission medications    Medication Sig Start Date End Date Taking? Authorizing Provider   calcium citrate-vitamin D3 (CITRACAL + D) tablet Take  by mouth two (2) times a day. Yes Historical Provider   B.infantis-B.ani-B.long-B.bifi (PROBIOTIC 4X) 10-15 mg TbEC Take  by mouth. Yes Historical Provider   Biotin 2,500 mcg cap Take  by mouth. Yes Historical Provider   levothyroxine (SYNTHROID) 88 mcg tablet Take 1 Tab by mouth Daily (before breakfast). 17  Yes Amparo Wells II, MD   silver sulfADIAZINE (SILVADENE) 1 % topical cream Apply  to affected area daily. Yes Historical Provider   losartan (COZAAR) 100 mg tablet TAKE 1 TABLET DAILY 17  Yes Amparo Wells II, MD   CALCIUM CARB/VIT D2/MINERALS (CALTRATE PLUS PO) Take  by mouth daily. Yes Historical Provider   MULTIVITAMIN/IRON/FOLIC ACID (CENTRUM COMPLETE PO) Take  by mouth daily. Yes Historical Provider   fluticasone (FLONASE) 50 mcg/actuation nasal spray nightly. Yes Historical Provider   rosuvastatin (CRESTOR) 20 mg tablet TAKE 1 TABLET DAILY 9/25/15  Yes Sheryl Rodriguez MD   gabapentin (NEURONTIN) 300 mg capsule Take 300 mg by mouth nightly. 11  Yes Historical Provider   CETIRIZINE HCL (ZYRTEC PO) Take  by mouth daily.    Yes Historical Provider   CYANOCOBALAMIN, VITAMIN B-12, (VITAMIN B-12 PO) Take 1,000 mcg by mouth daily. 7/13/11  Yes Historical Provider   levoFLOXacin (LEVAQUIN) 750 mg tablet Take 1 Tab by mouth daily. 3/30/18   Angelica Camarillo II, MD   ciprofloxacin HCl (CILOXAN) 0.3 % ophthalmic solution Administer 2 Drops to right eye four (4) times daily. 3/30/18   Angelica Camarillo II, MD   amoxicillin-clavulanate (AUGMENTIN) 500-125 mg per tablet Take 1 Tab by mouth two (2) times a day. 3/12/18   Angelica Camarillo II, MD   insulin lispro protamine/insulin lispro (HUMALOG MIX 75-25) 100 unit/mL (75-25) injection by SubCUTAneous route Before breakfast and dinner. Historical Provider   cyclobenzaprine (FLEXERIL) 5 mg tablet Take 5 mg by mouth three (3) times daily as needed for Muscle Spasm(s). Historical Provider   cephALEXin (KEFLEX) 500 mg capsule Take 500 mg by mouth three (3) times daily. Historical Provider   ACCU-CHEK LEILA PLUS TEST STRP strip USE TO TEST TWICE DAILY 12/29/15   Jayme Vargas MD   atenolol (TENORMIN) 25 mg tablet TAKE 1 TABLET DAILY 11/9/15   Jayme Vargas MD   Cholecalciferol, Vitamin D3, 5,000 unit Tab Take  by mouth daily. Historical Provider   cevimeline (EVOXAC) 30 mg capsule Take 30 mg by mouth daily. Historical Provider     Allergies   Allergen Reactions    Niacin Other (comments)     Skin irritation    Sulfa (Sulfonamide Antibiotics) Unknown (comments)    Tricor [Fenofibrate Micronized] Swelling        Review of Systems:    A comprehensive review of systems was negative except for that written in the History of Present Illness. Objective:     Visit Vitals    /58    Pulse 66    Temp 97.4 °F (36.3 °C)    Resp 12    Ht 5' 1.5\" (1.562 m)    Wt 73.2 kg (161 lb 7 oz)    SpO2 100%    BMI 30.01 kg/m2        Physical Exam: nad  Chest clear  Heart reg  abd soft    Imaging:  images and reports reviewed    Lab Review:  No results found for this or any previous visit (from the past 24 hour(s)).     Labs and radiology: images and reports reviewed      Assessment:   Hx genna    Plan:     1. I recommend proceeding with colonoscopy. Treatment alternatives were discussed. 2. Discussed aspects of surgical intervention, methods, risks (including by not limited to infection, bleeding, hematoma, and perforation of the intestines or solid organs), and the risks of general anesthetic. The patient understands the risks; any and all questions were answered to the patient's satisfaction.     Signed By: Alfa Shaver MD     April 10, 2018

## 2018-04-10 NOTE — ANESTHESIA PREPROCEDURE EVALUATION
Anesthetic History   No history of anesthetic complications            Review of Systems / Medical History  Patient summary reviewed, nursing notes reviewed and pertinent labs reviewed    Pulmonary        Sleep apnea: CPAP           Neuro/Psych   Within defined limits           Cardiovascular    Hypertension              Exercise tolerance: >4 METS     GI/Hepatic/Renal         Renal disease: stones       Endo/Other    Diabetes  Hypothyroidism  Obesity     Other Findings            Physical Exam    Airway  Mallampati: II  TM Distance: 4 - 6 cm  Neck ROM: normal range of motion   Mouth opening: Normal     Cardiovascular  Regular rate and rhythm,  S1 and S2 normal,  no murmur, click, rub, or gallop             Dental  No notable dental hx       Pulmonary  Breath sounds clear to auscultation               Abdominal  GI exam deferred       Other Findings            Anesthetic Plan    ASA: 2  Anesthesia type: total IV anesthesia and MAC          Induction: Intravenous  Anesthetic plan and risks discussed with: Patient

## 2018-04-10 NOTE — OP NOTES
Colonoscopy Procedure Note    Indications: Previous adenomatous polyp    Anesthesia/Sedation: MAC anesthesia Propofol    Pre-Procedure Exam:  Airway: clear   Heart: normal S1and S2    Lungs: clear bilateral  Abdomen: soft, nontender, bowel sounds present and normal in all quadrants   Mental Status: awake, alert, and oriented to person, place, and time      Procedure in Detail:  Informed consent was obtained for the procedure, including sedation. Risks of perforation, hemorrhage, adverse drug reaction, and aspiration were discussed. The patient was placed in the left lateral decubitus position. Based on the pre-procedure assessment, including review of the patient's medical history, medications, allergies, and review of systems, she had been deemed to be an appropriate candidate for moderate sedation; she was therefore sedated with the medications listed above. The patient was monitored continuously with ECG tracing, pulse oximetry, blood pressure monitoring, and direct observations. A rectal examination was performed. The [id number] was inserted into the rectum and advanced under direct vision to the cecum, which was identified by the ileocecal valve and appendiceal orifice. The quality of the colonic preparation was excellent. A careful inspection was made as the colonoscope was withdrawn, including a retroflexed view of the rectum; findings and interventions are described below. Appropriate photodocumentation was obtained. Findings:   Rectum:   Normal  Sigmoid:     - Excavated lesions:     - Diverticulosis  Descending Colon:   Normal  Transverse Colon:   Normal  Ascending Colon:   Normal  Cecum:   Normal          Specimens: No specimens were collected. EBL: None    Complications: None; patient tolerated the procedure well. Attending Attestation: I performed the procedure. Recommendations:   - Repeat colonoscopy in 5 years.     Signed By: Afshin Naylor MD                        April 10, 2018

## 2018-04-11 NOTE — ANESTHESIA POSTPROCEDURE EVALUATION
Post-Anesthesia Evaluation and Assessment    Patient: Yeimy Nieves MRN: [id number]  SSN: [id number]    YOB: 1950  Age: 79 y.o. Sex: female       Cardiovascular Function/Vital Signs  Visit Vitals    /68    Pulse (!) 58    Temp 36.7 °C (98 °F)    Resp (!) 7    Ht 5' 1.5\" (1.562 m)    Wt 73.2 kg (161 lb 7 oz)    SpO2 91%    BMI 30.01 kg/m2       Patient is status post total IV anesthesia, MAC anesthesia for Procedure(s):  COLONOSCOPY. Nausea/Vomiting: None    Postoperative hydration reviewed and adequate. Pain:  Pain Scale 1: Numeric (0 - 10) (04/10/18 1413)  Pain Intensity 1: 0 (04/10/18 1413)   Managed    Neurological Status: At baseline    Mental Status and Level of Consciousness: Arousable    Pulmonary Status:   O2 Device: Room air (04/10/18 1413)   Adequate oxygenation and airway patent    Complications related to anesthesia: None    Post-anesthesia assessment completed.  No concerns    Signed By: Caro Sebastian MD     April 11, 2018

## 2018-05-17 ENCOUNTER — OFFICE VISIT (OUTPATIENT)
Dept: INTERNAL MEDICINE CLINIC | Age: 68
End: 2018-05-17

## 2018-05-17 VITALS
WEIGHT: 164 LBS | SYSTOLIC BLOOD PRESSURE: 133 MMHG | RESPIRATION RATE: 18 BRPM | HEART RATE: 69 BPM | HEIGHT: 62 IN | DIASTOLIC BLOOD PRESSURE: 69 MMHG | BODY MASS INDEX: 30.18 KG/M2 | TEMPERATURE: 98.4 F | OXYGEN SATURATION: 97 %

## 2018-05-17 DIAGNOSIS — I10 ESSENTIAL HYPERTENSION: Primary | ICD-10-CM

## 2018-05-17 NOTE — PROGRESS NOTES
Subjective:  Rema Trent comes to the office today in follow up for:  1. Hypertension. 2. Obesity, S/P sleeve gastrectomy. 3. Hyperlipidemia. Current Medications:  1. Synthroid 0.1 mg daily. 2. Gabapentin 300 mg t.i.d.  3. Losartan 100 mg daily. 4. Crestor 20 mg daily. 5. Zyrtec 10 mg daily. 6. Flonase, two sprays daily. 7. Flexeril 5 mg prn.  8. Centrum Silver, one daily. 9. Vitamin B12 1,000 mcg daily  10. Vitamin D 5,000 units daily. Rema Trent is feeling well. She maintains an active lifestyle. She has regained 17 lbs since her marco in December of 2016. Physical Examination:  GENERAL:  WT: 164. BP: 133/69. P: 69 and regular. HEENT:  Unremarkable. NECK:  Without jugular venous distention, adenopathy, thyromegaly or carotid bruits. CHEST:  Lungs clear. CARDIAC:  Heart - quiet precordium, regular rhythm without murmurs or gallops. ABDOMEN:  Soft, non tender without hepatosplenomegaly. EXTREMITIES:  Without edema. Pulses are normal.  Sensation is normal.    Plan:  1. The lab is quite crowded today. She'll return tomorrow for lab work. 2. Continue current medication regimen as outlined. 3. Follow up in October for full review. I'll call with the results of the lab work.

## 2018-05-17 NOTE — PROGRESS NOTES
Chandrakant Hitchcock is a 79 y.o. female  Chief Complaint   Patient presents with    Cold Symptoms     follow up     Visit Vitals    /69 (BP 1 Location: Left arm, BP Patient Position: Sitting)    Pulse 69    Temp 98.4 °F (36.9 °C) (Oral)    Resp 18    Ht 5' 1.5\" (1.562 m)    Wt 164 lb (74.4 kg)    LMP  (LMP Unknown)    SpO2 97%    BMI 30.49 kg/m2     1. Have you been to the ER, urgent care clinic since your last visit? Hospitalized since your last visit?  no    2. Have you seen or consulted any other health care providers outside of the [address] [place] [address] since your last visit? Include any pap smears or colon screening.  no

## 2018-05-18 ENCOUNTER — LAB ONLY (OUTPATIENT)
Dept: INTERNAL MEDICINE CLINIC | Age: 68
End: 2018-05-18

## 2018-05-18 DIAGNOSIS — E11.22 TYPE 2 DIABETES MELLITUS WITH STAGE 3 CHRONIC KIDNEY DISEASE, WITHOUT LONG-TERM CURRENT USE OF INSULIN (HCC): Primary | ICD-10-CM

## 2018-05-18 DIAGNOSIS — N18.30 TYPE 2 DIABETES MELLITUS WITH STAGE 3 CHRONIC KIDNEY DISEASE, WITHOUT LONG-TERM CURRENT USE OF INSULIN (HCC): Primary | ICD-10-CM

## 2018-05-18 DIAGNOSIS — E03.9 ACQUIRED HYPOTHYROIDISM: ICD-10-CM

## 2018-05-18 DIAGNOSIS — E78.5 DYSLIPIDEMIA: ICD-10-CM

## 2018-05-18 LAB
ALBUMIN SERPL-MCNC: 4.2 G/DL (ref 3.9–5.4)
ALKALINE PHOS POC: 88 U/L (ref 38–126)
ALT SERPL-CCNC: 27 U/L (ref 9–52)
AST SERPL-CCNC: 22 U/L (ref 14–36)
BUN BLD-MCNC: 25 MG/DL (ref 7–17)
CALCIUM BLD-MCNC: 9.8 MG/DL (ref 8.4–10.2)
CHLORIDE BLD-SCNC: 103 MMOL/L (ref 98–107)
CHOLEST SERPL-MCNC: 128 MG/DL (ref 0–200)
CO2 POC: 29 MMOL/L (ref 22–32)
CREAT BLD-MCNC: 1.2 MG/DL (ref 0.7–1.2)
EGFR (POC): 46.7
GLUCOSE POC: 127 MG/DL (ref 65–105)
HBA1C MFR BLD HPLC: 6.1 % (ref 4.5–5.7)
HDLC SERPL-MCNC: 39 MG/DL (ref 35–130)
LDL CHOLESTEROL POC: 44 MG/DL (ref 0–130)
POTASSIUM SERPL-SCNC: 5 MMOL/L (ref 3.6–5)
PROT SERPL-MCNC: 7.1 G/DL (ref 6.3–8.2)
SODIUM SERPL-SCNC: 144 MMOL/L (ref 137–145)
TCHOL/HDL RATIO (POC): 3.3 (ref 0–4)
TOTAL BILIRUBIN POC: 0.6 MG/DL (ref 0.2–1.3)
TRIGL SERPL-MCNC: 225 MG/DL (ref 0–200)
VLDLC SERPL CALC-MCNC: 45 MG/DL

## 2018-05-21 LAB — TSH BLD-ACNC: 0.89 UIU/ML (ref 0.4–4.2)

## 2018-05-21 NOTE — PROGRESS NOTES
[id number] up to 6.1-she has gained 16 lbs since the fall    Kidney fct,K+,LFTS-all nl;thyroid is nl

## 2018-06-11 RX ORDER — ROSUVASTATIN CALCIUM 20 MG/1
TABLET, COATED ORAL
Qty: 90 TAB | Refills: 3 | Status: SHIPPED | OUTPATIENT
Start: 2018-06-11 | End: 2019-08-08 | Stop reason: SDUPTHER

## 2018-09-06 RX ORDER — GABAPENTIN 300 MG/1
CAPSULE ORAL
Qty: 180 CAP | Refills: 4 | Status: SHIPPED | OUTPATIENT
Start: 2018-09-06 | End: 2019-09-09 | Stop reason: SDUPTHER

## 2018-09-06 RX ORDER — LOSARTAN POTASSIUM 100 MG/1
TABLET ORAL
Qty: 90 TAB | Refills: 3 | Status: SHIPPED | OUTPATIENT
Start: 2018-09-06 | End: 2019-06-04 | Stop reason: SDUPTHER

## 2018-10-04 ENCOUNTER — HOSPITAL ENCOUNTER (OUTPATIENT)
Dept: MAMMOGRAPHY | Age: 68
Discharge: HOME | End: 2018-10-04
Attending: INTERNAL MEDICINE
Payer: MEDICARE

## 2018-10-04 DIAGNOSIS — Z12.39 SCREENING BREAST EXAMINATION: ICD-10-CM

## 2018-10-04 PROCEDURE — 77067 SCR MAMMO BI INCL CAD: CPT

## 2018-10-09 ENCOUNTER — HOSPITAL ENCOUNTER (OUTPATIENT)
Dept: MAMMOGRAPHY | Age: 68
Discharge: HOME | End: 2018-10-09
Attending: INTERNAL MEDICINE
Payer: MEDICARE

## 2018-10-09 ENCOUNTER — HOSPITAL ENCOUNTER (OUTPATIENT)
Dept: ULTRASOUND IMAGING | Age: 68
Discharge: HOME | End: 2018-10-09
Attending: INTERNAL MEDICINE
Payer: MEDICARE

## 2018-10-09 DIAGNOSIS — R92.8 ABNORMAL MAMMOGRAM: ICD-10-CM

## 2018-10-09 PROCEDURE — 77065 DX MAMMO INCL CAD UNI: CPT

## 2018-10-12 ENCOUNTER — LAB ONLY (OUTPATIENT)
Dept: INTERNAL MEDICINE CLINIC | Age: 68
End: 2018-10-12

## 2018-10-12 DIAGNOSIS — Z00.00 ANNUAL PHYSICAL EXAM: Primary | ICD-10-CM

## 2018-10-12 DIAGNOSIS — E11.649 UNCONTROLLED TYPE 2 DIABETES MELLITUS WITH HYPOGLYCEMIA WITHOUT COMA (HCC): ICD-10-CM

## 2018-10-12 DIAGNOSIS — E03.9 ACQUIRED HYPOTHYROIDISM: ICD-10-CM

## 2018-10-12 DIAGNOSIS — N39.0 URINARY TRACT INFECTION WITHOUT HEMATURIA, SITE UNSPECIFIED: ICD-10-CM

## 2018-10-12 DIAGNOSIS — E78.5 HYPERLIPIDEMIA LDL GOAL <100: ICD-10-CM

## 2018-10-12 LAB
ALBUMIN SERPL-MCNC: 4.3 G/DL (ref 3.9–5.4)
ALKALINE PHOS POC: 75 U/L (ref 38–126)
ALT SERPL-CCNC: 27 U/L (ref 9–52)
AST SERPL-CCNC: 24 U/L (ref 14–36)
BACTERIA UA POCT, BACTPOCT: ABNORMAL
BILIRUB UR QL STRIP: NEGATIVE
BUN BLD-MCNC: 24 MG/DL (ref 7–17)
CALCIUM BLD-MCNC: 9.8 MG/DL (ref 8.4–10.2)
CASTS UA POCT: ABNORMAL
CHLORIDE BLD-SCNC: 104 MMOL/L (ref 98–107)
CHOLEST SERPL-MCNC: 146 MG/DL (ref 0–200)
CK (CPK) POC: 62 U/L (ref 30–135)
CLUE CELLS, CLUEPOCT: NEGATIVE
CO2 POC: 29 MMOL/L (ref 22–32)
CREAT BLD-MCNC: 1 MG/DL (ref 0.7–1.2)
CRYSTALS UA POCT, CRYSPOCT: NEGATIVE
EGFR (POC): 57.8
EPITHELIAL CELLS POCT: ABNORMAL
GLUCOSE POC: 122 MG/DL (ref 65–105)
GLUCOSE UR-MCNC: NEGATIVE MG/DL
GRAN# POC: 4 K/UL (ref 2–7.8)
GRAN% POC: 66.7 % (ref 37–92)
HBA1C MFR BLD HPLC: 5.7 % (ref 4.5–5.7)
HCT VFR BLD CALC: 34.8 % (ref 37–51)
HDLC SERPL-MCNC: 41 MG/DL (ref 35–130)
HGB BLD-MCNC: 11.9 G/DL (ref 12–18)
IRON POC: 65 UG/DL (ref 37–170)
IRON SATURATION POC: 16 % (ref 15–55)
KETONES P FAST UR STRIP-MCNC: NEGATIVE MG/DL
LDL CHOLESTEROL POC: 54.2 MG/DL (ref 0–130)
LY# POC: 1.7 K/UL (ref 0.6–4.1)
LY% POC: 30.6 % (ref 10–58.5)
MCH RBC QN: 31.1 PG (ref 26–32)
MCHC RBC-ENTMCNC: 34.2 G/DL (ref 30–36)
MCV RBC: 91 FL (ref 80–97)
MICROALBUMIN UR TEST STR-MCNC: 20 MG/L (ref 0–20)
MID #, POC: 0.1 K/UL (ref 0–1.8)
MID% POC: 2.7 % (ref 0.1–24)
MUCUS UA POCT, MUCPOCT: ABNORMAL
PH UR STRIP: 6 [PH] (ref 5–7)
PLATELET # BLD: 170 K/UL (ref 140–440)
POTASSIUM SERPL-SCNC: 4.3 MMOL/L (ref 3.6–5)
PROT SERPL-MCNC: 7.2 G/DL (ref 6.3–8.2)
PROT UR QL STRIP: NEGATIVE
RBC # BLD: 3.83 M/UL (ref 4.2–6.3)
RBC UA POCT, RBCPOCT: ABNORMAL
SODIUM SERPL-SCNC: 145 MMOL/L (ref 137–145)
SP GR UR STRIP: 1.02 (ref 1.01–1.02)
T4 FREE SERPL-MCNC: 1.17 NG/DL (ref 0.71–1.85)
TCHOL/HDL RATIO (POC): 3.6 (ref 0–4)
TIBC POC: 411 UG/DL (ref 265–497)
TOTAL BILIRUBIN POC: 0.6 MG/DL (ref 0.2–1.3)
TRICH UA POCT, TRICHPOC: NEGATIVE
TRIGL SERPL-MCNC: 254 MG/DL (ref 0–200)
TSH BLD-ACNC: 1.34 UIU/ML (ref 0.4–4.2)
UA UROBILINOGEN AMB POC: NORMAL (ref 0.2–1)
URINALYSIS CLARITY POC: CLEAR
URINALYSIS COLOR POC: YELLOW
URINE BLOOD POC: NEGATIVE
URINE CULT COMMENT, POCT: ABNORMAL
URINE LEUKOCYTES POC: ABNORMAL
URINE NITRITES POC: NEGATIVE
VITAMIN D POC: 46.7 NG/ML (ref 30–96)
VLDLC SERPL CALC-MCNC: 50.8 MG/DL
WBC # BLD: 5.8 K/UL (ref 4.1–10.9)
WBC UA POCT, WBCPOCT: ABNORMAL
YEAST UA POCT, YEASTPOC: NEGATIVE

## 2018-10-13 LAB — BACTERIA UR CULT: NO GROWTH

## 2018-10-19 ENCOUNTER — OFFICE VISIT (OUTPATIENT)
Dept: INTERNAL MEDICINE CLINIC | Age: 68
End: 2018-10-19

## 2018-10-19 VITALS
HEART RATE: 78 BPM | OXYGEN SATURATION: 98 % | TEMPERATURE: 98.4 F | BODY MASS INDEX: 33.96 KG/M2 | SYSTOLIC BLOOD PRESSURE: 156 MMHG | DIASTOLIC BLOOD PRESSURE: 88 MMHG | HEIGHT: 60 IN | WEIGHT: 173 LBS

## 2018-10-19 DIAGNOSIS — R93.1 ABNORMAL HEART SCORE CT: ICD-10-CM

## 2018-10-19 DIAGNOSIS — Z23 ENCOUNTER FOR IMMUNIZATION: ICD-10-CM

## 2018-10-19 DIAGNOSIS — I25.10 ASHD (ARTERIOSCLEROTIC HEART DISEASE): ICD-10-CM

## 2018-10-19 DIAGNOSIS — Z00.00 ANNUAL PHYSICAL EXAM: Primary | ICD-10-CM

## 2018-10-19 NOTE — LETTER
10/19/2018 12:40 PM 
 
Ms. Regina Cho [address] [id number] Dear Uzma Concepcion: 
  
I am writing this letter as a follow-up to your recent physical examination. I have enclosed copies of your lab work for your review. After going over this information, if you have any questions please give me a call.  
  
Your current active and past medical problems include: 1. Hypertension. 2. Hypothyroidism, on replacement. 3. Type 2 diabetes. 4. Stage 3 CKD. 5. You had a sleeve gastrectomy in March of 2016. Even though you've gained some weight recently you have still lost approximately 15 pounds. 6. Bilateral cataract extractions. 7. Arteriosclerotic heart disease. You had a CT scan of the heart done in November, 2013. That showed a coronary artery calcium score of 158, indicating moderate nonobstructive coronary artery disease. We are going to schedule a repeat test this year and I'll call you with the results. 8. You have had your gallbladder removed. 9. You have had a history of colon polyps and had a recent normal colonsocopy. 10. You had a basal cell skin cancer excised from the tip of your nose. 11. Hyperlipidemia. 12. Chronic low back pain. 13. 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 14. You have a history of kidney stones. 15. You have a history of a mild anemia. 
  
Your current medications are: 1. Neurontin 300 mg at bedtime. 2. Losartan 100 mg daily. 3. Crestor 20 mg daily. 4. Levoxyl 0.088 mg daily. 5. Flonase, two sprays daily. 6. Probiotic, one daily. 7. B complex vitamins, one daily. 8. Biotin 10,000 units daily. 9. Centrum Silver, one daily. 10. Citracal D, two daily. 
  
On physical examination, your height was 5'. Your weight was 173. (Last year's check of your weight was 160 and your BMI was 31. That is a good goal for you to get back to.)  Your blood pressure initially was 156/88, when I checked it, it was 118/70, which is perfect.   Your physical examination was entirely normal except for the calluses on the bottom of both feet. They should be sanded down by either your  or your podiatrist.   
  
Laboratory - your hemoglobin is stable, but slightly low at 11.9. White blood count, platelet count and iron level are normal.  Your blood sugar is 122 and hemoglobin A1c is 5.7. That is excellent. Your kidney function, manifest by your serum creatinine, is 1.0, which is normal.  You don't have to go to the renal doctor anymore. Potassium, calcium level and liver function studies were normal.   Total cholesterol was 146, LDL (bad cholesterol) was 54 and HDL (good cholesterol) was 41. That is a perfect lipid profile. Your triglycerides are too high at 254. Goal for that is 150 or less. You don't need medications for that, but you do need to restrict carbohydrates and that will come down. TSH, which is a measure of thyroid function, is normal at 1.34, indicating you are on the appropriate dose of thyroid hormone replacement. Your urine specimen showed some white blood cells and red blood cells and bacteria. We took a culture, which was negative. I would like to repeat this urine specimen using midstream clean catch technique. If you'll come by the office I'll show you how to do it and I would like to see if we can't clear those red cells and white cells from the urine. Vitamin D level is normal at 47. 
  
You had a colonoscopy in April of 2018. That should be repeated in five years. You had a mammogram in October. We updated your influenza vaccine. You've had both pneumonia vaccines, tetanus, whooping cough and the older shingles vaccine. I will enclose with this letter a prescription for the new shingles vaccine. I highly recommend it. Take it to your drugstore and they will administer it. Your eye exam is up to date. You had a normal bone density test in 2016. I think you can wait till five years, which will be 2021, to repeat that. As you know, I will be retiring November 1st.  It's been a pleasure caring for you and your family all these years. I wish you the best of luck in the future. Sincerely, Santo Hanna MD

## 2018-10-19 NOTE — PROGRESS NOTES
Reviewed record in preparation for visit and have obtained necessary documentation. Identified pt with two pt identifiers(name and ). Chief Complaint   Patient presents with    Annual Exam        Coordination of Care Questionnaire:  :     1) Have you been to an emergency room, urgent care clinic since your last visit? No     Hospitalized since your last visit? No             2) Have you seen or consulted any other health care providers outside of [address] [place] [address] since your last visit?  Dr. Filippo Brody

## 2018-10-19 NOTE — PROGRESS NOTES
Janiya Guadalupe comes to the office today for a comprehensive medical evaluation.     Past Medical History:  1. Hypertension. 2. Hypothyroidism, treated. 3. Type 2 diabetes, controlled. 4. Stage 3 CKD. 5. Obesity, status post sleeve gastrectomy in .  6. Status post bilateral cataract extractions, 2016, Dr. Margot Shah. 7. Arteriosclerotic heart disease. CT scan of the heart in  showed a coronary artery calcium score of 158.  8. Status post open cholecystectomy. 9. Status post colonoscopic polypectomy. Most recent colonoscopy . 10. Nonallergic rhinitis. 11. Basal cell carcinoma of the tip of the nose, status post excision. 12. Mixed hyperlipidemia. 13. Chronic low back pain with sciatica. 14. Pigmentary maculopathy, followed by Dr. Twyla Pride. 15. Sleep apnea, not treated. 16. History of renal calculi. 17. Mild normocytic, normochromic anemia.     Current Medications:  1. Neurontin 300 mg q.h.s. 2. Losartan 100 mg daily. 3. Crestor 20 mg daily. 4. Flonase, two sprays daily. 5. Probiotic, one daily. 6. B complex vitamin, one daily. 7. Biotin 10,000 units daily. 8. Centrum Silver, one daily. 9. Citracal D, two daily.     Drug Allergies:    1. Sulfa. 2. She is intolerant of niacin. 3. Tricor caused abdominal pain.      Social History:  She is a retired . She is . 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 She is a lifelong nonsmoker and non drinker. She has two grandchildren, both of which have severe autism and she helps provide  for them.     Family History:  Her mother  at age 79 from a ruptured abdominal aortic aneurysm and lung cancer. Her father  of end stage renal disease and a stroke at age 77. She has a brother who is alive and well.     Review of Systems:  She is trying hard to follow a prudent diet. She's seen a dietitian through Dr. Anthony Harry office.   She is under a lot of stress with the daily routine with her grandchildren. She denies exertional chest pain, chest pressure, chest tightness, shortness of breath, PND, orthopnea or ankle edema.     Physical Examination:  GENERAL:  HT: 5'.  WT: 173. BP: 156/88 initially, subsequently I got 118/70. T: 98.4. P: 78 and regular. O2 saturation on room air: 98%. HEENT:  Head normocephalic, atraumatic. Eyes - pupils midrange, equal directly and consensually. Extraocular movements are normal.  Ears, nose and throat are normal.  NECK:  Without JVD, adenopathy, thyromegaly or carotid bruits. LUNGS:  Clear. BREASTS:  Without masses. HEART:   Quiet precordium. Regular rate and rhythm. No audible murmurs or gallops. ABDOMEN:  Soft, non tender. No detectable hepatosplenomegaly, abdominal masses, bruits or ascites. EXTREMITIES:  Without edema. Pulses are normal.  Sensation to fine touch is normal.  She has rather heavy callus on both feet on the plantar aspect over the second MTP joint. SKIN:  No suspicious lesions. NEUROLOGICAL:  Cranial nerves II-XII are intact. Strength, gait and mental status are normal for age.     Studies:  EKG - normal sinus rhythm, WNL.     Laboratory:  Hemoglobin is slightly low, but stable at 11.9. Iron stores are normal.  WBC and platelet count are normal.  Blood sugar 122, Hgb A1c 5.7. Total cholesterol 146, triglycerides 254, LDL 54, HDL 41. Creatinine 1.0. Potassium, calcium level and LFTs normal.  Urine showed 20-30 white cells and 3-5 red cells per HPF and 1+ bacteria. Culture was no growth. Microalbumin 20 mg/LTSH 1.34. Vitamin D level is therapeutic at 47.     Impression:  1. Hypertension, good control. 2. Type 2 diabetes, diet controlled. 3. Hypothyroidism, on replacement. 4. Stage 3 CKD. 5. Obesity, S/P sleeve gastrectomy. 6. Arteriosclerotic heart disease as outlined. 7. S/P bilateral cataract extractions. 8. Status post cholecystectomy. 9. Status post colonoscopic polypectomy.   10. History of basal cell carcinoma. 11. Hyperlipidemia. 12. Pyuria and hematuria - sterile. 13. Mild normocytic normochromic anemia.     Plan:  1. Flu vaccine given today. She's had Pneumovax twice before and after 72, Prevnar 13 is up to date. TDAP is up to date. She's had Zostavax. I'll include a prescription for Shingrix with this letter. 2. She had a colonoscopy in April, 2018, should be repeated in five years. 3. Just had a mammogram in October. 4. Eye exam is current. 5. I'm going to ask her to bring in a first void urine and do a midstream clean catch urine just to be sure the hematuria clears. 6. Follow up in six months with Dr. Eloise An. All screenings were reviewed and results discussed with the patient, who verbalized understand and agreement with the plans. A copy of the after visit summary with a personalized health plan was provided to the patient on the day of the visit.

## 2018-11-01 ENCOUNTER — HOSPITAL ENCOUNTER (OUTPATIENT)
Dept: CT IMAGING | Age: 68
Discharge: HOME | End: 2018-11-01
Attending: INTERNAL MEDICINE
Payer: MEDICARE

## 2018-11-01 DIAGNOSIS — R93.1 ABNORMAL HEART SCORE CT: ICD-10-CM

## 2018-11-01 DIAGNOSIS — I25.10 ASHD (ARTERIOSCLEROTIC HEART DISEASE): ICD-10-CM

## 2018-11-01 PROCEDURE — 75571 CT HRT W/O DYE W/CA TEST: CPT

## 2018-11-02 DIAGNOSIS — R93.1 ABNORMAL HEART SCORE CT: ICD-10-CM

## 2018-11-02 DIAGNOSIS — I25.10 ASHD (ARTERIOSCLEROTIC HEART DISEASE): Primary | ICD-10-CM

## 2018-11-02 NOTE — PROGRESS NOTES
Patient informed that CT heart calcium score much higher. She is already bein treated adequately for lipids, BP and BS. Suggest lexiscan stress then FU with me.  Patient wants scheduled at [address]

## 2018-11-02 NOTE — PROGRESS NOTES
CT heart calcium score much higher. She is already bein treated adequately for lipids, BP and BS.   Suggest lexiscan stress then FU with me

## 2018-11-15 PROBLEM — H10.30 ACUTE BACTERIAL CONJUNCTIVITIS: Status: RESOLVED | Noted: 2018-03-30 | Resolved: 2018-11-15

## 2018-11-15 PROBLEM — R05.9 COUGH: Status: RESOLVED | Noted: 2017-10-18 | Resolved: 2018-11-15

## 2018-11-15 PROBLEM — L02.229 BOIL OF TRUNK: Status: RESOLVED | Noted: 2017-10-18 | Resolved: 2018-11-15

## 2018-11-15 PROBLEM — J40 BRONCHITIS: Status: RESOLVED | Noted: 2017-10-18 | Resolved: 2018-11-15

## 2018-11-15 PROBLEM — R07.9 CHEST PAIN, EXERTIONAL: Status: RESOLVED | Noted: 2017-10-18 | Resolved: 2018-11-15

## 2018-11-15 PROBLEM — H65.02 ACUTE SEROUS OTITIS MEDIA OF LEFT EAR: Status: RESOLVED | Noted: 2017-10-18 | Resolved: 2018-11-15

## 2018-11-15 PROBLEM — J01.40 ACUTE PANSINUSITIS: Status: RESOLVED | Noted: 2018-03-30 | Resolved: 2018-11-15

## 2018-11-16 ENCOUNTER — OFFICE VISIT (OUTPATIENT)
Dept: INTERNAL MEDICINE CLINIC | Age: 68
End: 2018-11-16

## 2018-11-16 VITALS
TEMPERATURE: 98 F | SYSTOLIC BLOOD PRESSURE: 122 MMHG | WEIGHT: 171.2 LBS | OXYGEN SATURATION: 99 % | HEART RATE: 65 BPM | BODY MASS INDEX: 33.61 KG/M2 | HEIGHT: 60 IN | DIASTOLIC BLOOD PRESSURE: 70 MMHG | RESPIRATION RATE: 16 BRPM

## 2018-11-16 DIAGNOSIS — I10 ESSENTIAL HYPERTENSION: ICD-10-CM

## 2018-11-16 DIAGNOSIS — I25.10 CORONARY ARTERY CALCIFICATION: ICD-10-CM

## 2018-11-16 DIAGNOSIS — E78.5 HYPERLIPIDEMIA LDL GOAL <100: ICD-10-CM

## 2018-11-16 DIAGNOSIS — E11.9 TYPE 2 DIABETES MELLITUS WITHOUT COMPLICATION, WITHOUT LONG-TERM CURRENT USE OF INSULIN (HCC): Primary | ICD-10-CM

## 2018-11-16 DIAGNOSIS — I25.84 CORONARY ARTERY CALCIFICATION: ICD-10-CM

## 2018-11-16 RX ORDER — MULTIVIT WITH MINERALS/HERBS
1 TABLET ORAL DAILY
COMMUNITY
End: 2020-06-09

## 2018-11-16 NOTE — PROGRESS NOTES
1. Have you been to the ER, urgent care clinic since your last visit? Hospitalized since your last visit? No    2. Have you seen or consulted any other health care providers outside of the [facility] since your last visit? Include any pap smears or colon screening.  No     Chief Complaint   Patient presents with    Other     Follow-up stress test

## 2018-11-16 NOTE — PROGRESS NOTES
Haseeb Mckoy is a 76 y.o. female      Chief Complaint   Patient presents with    Other     Follow-up stress test        History of present illness: Ms. Heraclio Marshall returns at our request.  When last seen by Dr. Lara Garcia she was referred for a CT heart study, which revealed a significant increase in her calcium score. She was referred for stress testing, which was negative. I brought her back in to discuss her situation. It would appear to me that most of her change from five years ago probably occurred prior to her gastric sleeve operation, as she was uncontrolled in terms of her diabetes and cholesterol during that period of time. Since then she has had very adequate control of her blood pressure, cholesterol and blood sugar, which would be our goals at this point to prevent further development of symptoms. She understands all of this and we discussed it at length today. She denies any chest pain or symptoms of. She denies any current GI or  symptoms. We reconciled her medications today and cleaned up her med rec list.  She is appropriately treated per those medications currently. She will follow up in January per routine for a diabetic.      Patient Active Problem List    Diagnosis Date Noted    HTN (hypertension) 10/18/2017     Priority: 1 - One    Type 2 diabetes mellitus without complication, without long-term current use of insulin ([facility] [address]      Priority: 1 - One    Hyperlipidemia LDL goal <100      Priority: 1 - One    Coronary artery calcification 11/16/2018     Priority: 2 - Two    ASHD (arteriosclerotic heart disease) 10/19/2018     Priority: 2 - Two    Anemia in chronic kidney disease 10/18/2017     Priority: 2 - Two    Acquired hypothyroidism      Priority: 2 - Two    Eczema 10/18/2017     Priority: 3 - Three    Osteoporosis 10/18/2017     Priority: 3 - Three    History of cholecystectomy 10/18/2017     Priority: 4 - Four    Vitamin D deficiency      Priority: 4 - Four    Carcinoma ([facility] [address] 10/18/2017     Priority: 5 - Five    Abnormal Heart Score CT 10/19/2018    Abnormal kidney function 10/18/2017    Abnormal presence of protein in urine 10/18/2017    Annual physical exam 10/18/2017      Past Surgical History:   Procedure Laterality Date    DELIVERY       x2    HX CHOLECYSTECTOMY      HX OTHER SURGICAL  3/10/16    gastric sleeve    HX POLYPECTOMY      HX TONSILLECTOMY        Allergies   Allergen Reactions    Niacin Other (comments)     Skin irritation    Sulfa (Sulfonamide Antibiotics) Unknown (comments)    Tricor [Fenofibrate Micronized] Swelling      Family History   Problem Relation Age of Onset    Diabetes Father     Stroke Father     Cancer Mother         lung    Other Mother         aortic aneurysm      Social History     Socioeconomic History    Marital status:      Spouse name: Not on file    Number of children: Not on file    Years of education: Not on file    Highest education level: Not on file   Social Needs    Financial resource strain: Not on file    Food insecurity - worry: Not on file    Food insecurity - inability: Not on file   VIEO needs - medical: Not on file   VIEO needs - non-medical: Not on file   Occupational History    Not on file   Tobacco Use    Smoking status: Never Smoker    Smokeless tobacco: Never Used   Substance and Sexual Activity    Alcohol use: Yes     Comment: maybe a few times a year at most    Drug use: No    Sexual activity: Not on file   Other Topics Concern    Not on file   Social History Narrative    Lives in [place] with  of 44 years. Has one daughter and one son. Retired   at [employer]. Likes to read and play on the computer.      Outpatient Medications Marked as Taking for the 18 encounter (Office Visit) with Lupillo Swan MD   Medication Sig Dispense Refill    b complex vitamins tablet Take 1 Tab by mouth daily.      losartan (COZAAR) 100 mg tablet TAKE 1 TABLET DAILY 90 Tab 3    gabapentin (NEURONTIN) 300 mg capsule TAKE 1 CAPSULE TWICE A  Cap 4    rosuvastatin (CRESTOR) 20 mg tablet TAKE 1 TABLET DAILY 90 Tab 3    calcium citrate-vitamin D3 (CITRACAL + D) tablet Take  by mouth two (2) times a day.  B.infantis-B.ani-B.long-B.bifi (PROBIOTIC 4X) 10-15 mg TbEC Take  by mouth.  BIOTIN PO Take 5,000 mcg by mouth daily.  levothyroxine (SYNTHROID) 88 mcg tablet Take 1 Tab by mouth Daily (before breakfast). 90 Tab 3    silver sulfADIAZINE (SILVADENE) 1 % topical cream Apply  to affected area daily.  MULTIVITAMIN/IRON/FOLIC ACID (CENTRUM COMPLETE PO) Take  by mouth daily.  ACCU-CHEK LEILA PLUS TEST STRP strip USE TO TEST TWICE DAILY 100 Strip 11    fluticasone (FLONASE) 50 mcg/actuation nasal spray nightly.  CETIRIZINE HCL (ZYRTEC PO) Take 10 mg by mouth daily. Review of Systems              Constitutional:  She denies fever, weight loss, sweats or fatigue. HEENT:  No blurred or double vision, headache or dizziness. No difficulty with swallowing, taste, speech or smell. Respiratory:  No cough, wheezing or shortness of breath. No sputum production. Cardiac:  Denies chest pain, palpitations, unexplained indigestion, syncope, edema, PND or orthopnea. GI:  No changes in bowel movements, no abdominal pain, no bloating, anorexia, nausea, vomiting or heartburn. :  No frequency or dysuria. Denies incontinence. Extremities:  No joint pain, stiffness or swelling. Skin:  No recent rashes or mole changes. Neurological:  No numbness, tingling, burning paresthesias or loss of motor strength. No syncope, dizziness, frequent headaches or memory loss.       Objective:     Vitals:    11/16/18 0802   BP: 122/70   Pulse: 65   Resp: 16   Temp: 98 °F (36.7 °C)   TempSrc: Oral   SpO2: 99%   Weight: 171 lb 3.2 oz (77.7 kg)   Height: 5' 0.25\" (1.53 m)   PainSc:   0 - No pain Body mass index is 33.16 kg/m². Physical Examination:              General Appearance:  Well-developed, well-nourished, no acute  distress. HEENT:     Ears:  The TMs and ear canals were clear. Eyes:  The pupillary responses were normal.  Extraocular muscle function intact. Lids and conjunctiva not injected. Neck:  Supple without thyromegaly or adenopathy. No JVD noted. Lungs:  Clear to auscultation and percussion. Cardiac:  Regular rate and rhythm without murmur. GI: nontender w/o mass. Normal BS's. Extremities:  No clubbing, cyanosis or edema. Skin:  No rash or unusual mole changes noted. Neurological:  Grossly normal.            Assessment/Plan:   Impressions:      ICD-10-CM ICD-9-CM    1. Type 2 diabetes mellitus without complication, without long-term current use of insulin (HCC) E11.9 250.00    2. Hyperlipidemia LDL goal <100 E78.5 272.4    3. Essential hypertension I10 401.9    4. Coronary artery calcification I25.10 414.00     I25.84 414.4         Plan:  1. Continue present meds  2. Discussed lifestyle modifications including Na restriction, low carb/fat diet, weight reduction and exercise (at least a walking program). Follow-up Disposition:  Return in about 10 weeks (around 1/25/2019). No orders of the defined types were placed in this encounter.       Bina Zheng MD

## 2018-11-26 RX ORDER — LEVOTHYROXINE SODIUM 88 UG/1
88 TABLET ORAL
Qty: 90 TAB | Refills: 3 | Status: SHIPPED | OUTPATIENT
Start: 2018-11-26 | End: 2019-11-27 | Stop reason: SDUPTHER

## 2018-11-26 NOTE — TELEPHONE ENCOUNTER
Requested Prescriptions     Pending Prescriptions Disp Refills    levothyroxine (SYNTHROID) 88 mcg tablet 90 Tab 3     Sig: Take 1 Tab by mouth Daily (before breakfast).

## 2019-01-24 ENCOUNTER — OFFICE VISIT (OUTPATIENT)
Dept: INTERNAL MEDICINE CLINIC | Age: 69
End: 2019-01-24

## 2019-01-24 ENCOUNTER — HOSPITAL ENCOUNTER (OUTPATIENT)
Dept: LAB | Age: 69
Discharge: HOME | End: 2019-01-24
Payer: MEDICARE

## 2019-01-24 VITALS
OXYGEN SATURATION: 96 % | SYSTOLIC BLOOD PRESSURE: 126 MMHG | BODY MASS INDEX: 34.4 KG/M2 | RESPIRATION RATE: 16 BRPM | WEIGHT: 175.2 LBS | DIASTOLIC BLOOD PRESSURE: 68 MMHG | HEIGHT: 60 IN | TEMPERATURE: 98 F | HEART RATE: 66 BPM

## 2019-01-24 DIAGNOSIS — E78.5 HYPERLIPIDEMIA LDL GOAL <100: ICD-10-CM

## 2019-01-24 DIAGNOSIS — E11.9 TYPE 2 DIABETES MELLITUS WITHOUT COMPLICATION, WITHOUT LONG-TERM CURRENT USE OF INSULIN (HCC): Primary | ICD-10-CM

## 2019-01-24 DIAGNOSIS — I10 ESSENTIAL HYPERTENSION: ICD-10-CM

## 2019-01-24 LAB
BUN BLD-MCNC: 26 MG/DL (ref 7–17)
CALCIUM BLD-MCNC: 10.3 MG/DL (ref 8.4–10.2)
CHLORIDE BLD-SCNC: 104 MMOL/L (ref 98–107)
CO2 POC: 30 MMOL/L (ref 22–32)
CREAT BLD-MCNC: 1 MG/DL (ref 0.7–1.2)
EGFR (POC): 57.8
GLUCOSE POC: 140 MG/DL (ref 65–105)
HBA1C MFR BLD HPLC: NORMAL % (ref 4.5–5.7)
POTASSIUM SERPL-SCNC: 4.7 MMOL/L (ref 3.6–5)
SODIUM SERPL-SCNC: 142 MMOL/L (ref 137–145)

## 2019-01-24 PROCEDURE — 83036 HEMOGLOBIN GLYCOSYLATED A1C: CPT

## 2019-01-24 NOTE — ACP (ADVANCE CARE PLANNING)
Discussed Advanced Care Directives. No information on file. Information given for patient to review.  Also, informed patient of Honoring Choices program.

## 2019-01-24 NOTE — PROGRESS NOTES
Subjective:  
Ramona Alexander is a 76 y.o. female Chief Complaint Patient presents with  Diabetes Follow-up  Hypertension History of present illness:  Ms. Ghazal Kim returns in follow up regarding her blood pressure and diet controlled diabetes primarily. She has had no difficulties since the last visit other than she was discovered to have an abnormal appearing lesion on her scalp and this was biopsied recently by the dermatologist.  She is waiting for the path report as to what further needs to be done. She denies chest pain, shortness of breath or other symptoms of congestive heart failure. She has had no significant problems with nausea, vomiting, abdominal pain, heartburn or  symptoms. She has been compliant with her medications. They were reconciled and reviewed today. We will obtain her labs regarding her blood pressure and diabetes. We also talked about salt restriction and exercise for her weight and elevated body mass index. No other new issues today. She will follow up in three months' time fasting with additional check for her lipids at that point. Patient Active Problem List  
 Diagnosis Date Noted  HTN (hypertension) 10/18/2017 Priority: 1 - One  Type 2 diabetes mellitus without complication, without long-term current use of insulin ([address] Priority: 1 - One  
 Hyperlipidemia LDL goal <100 Priority: 1 - One  Coronary artery calcification 11/16/2018 Priority: 2 - Two  
 ASHD (arteriosclerotic heart disease) 10/19/2018 Priority: 2 - Two  Anemia in chronic kidney disease 10/18/2017 Priority: 2 - Two  Acquired hypothyroidism Priority: 2 - Two  Eczema 10/18/2017 Priority: 3 - Three  Osteoporosis 10/18/2017 Priority: 3 - Three  History of cholecystectomy 10/18/2017 Priority: 4 - Four  Vitamin D deficiency Priority: 4 - Four  Carcinoma ([address] 10/18/2017 Priority: 5 - Five  Abnormal Heart Score CT 10/19/2018  Abnormal kidney function 10/18/2017  Abnormal presence of protein in urine 10/18/2017  Annual physical exam 10/18/2017 Past Surgical History:  
Procedure Laterality Date  COLONOSCOPY N/A 4/10/2018 COLONOSCOPY performed by Amado Moody MD at \[facility]\"" ENDOSCOPY  DELIVERY     
 x2  
 HX CHOLECYSTECTOMY  HX OTHER SURGICAL  3/10/16  
 gastric sleeve  HX POLYPECTOMY  HX TONSILLECTOMY Allergies Allergen Reactions  Niacin Other (comments) Skin irritation  Sulfa (Sulfonamide Antibiotics) Unknown (comments)  Tricor [Fenofibrate Micronized] Swelling Family History Problem Relation Age of Onset  Diabetes Father  Stroke Father  Cancer Mother   
     lung  Other Mother   
     aortic aneurysm Social History Socioeconomic History  Marital status:  Spouse name: Not on file  Number of children: Not on file  Years of education: Not on file  Highest education level: Not on file Social Needs  Financial resource strain: Not on file  Food insecurity - worry: Not on file  Food insecurity - inability: Not on file  Transportation needs - medical: Not on file  Transportation needs - non-medical: Not on file Occupational History  Not on file Tobacco Use  Smoking status: Never Smoker  Smokeless tobacco: Never Used Substance and Sexual Activity  Alcohol use: Yes Comment: maybe a few times a year at most  
 Drug use: No  
 Sexual activity: Not on file Other Topics Concern  Not on file Social History Narrative Lives in [place] with  of 44 years. Has one daughter and one son. Retired   at [employer]. Likes to read and play on the computer.   
 
Outpatient Medications Marked as Taking for the 19 encounter (Office Visit) with Gisela Wheeler MD  
 Medication Sig Dispense Refill  levothyroxine (SYNTHROID) 88 mcg tablet Take 1 Tab by mouth Daily (before breakfast). 90 Tab 3  
 b complex vitamins tablet Take 1 Tab by mouth daily.  losartan (COZAAR) 100 mg tablet TAKE 1 TABLET DAILY 90 Tab 3  
 gabapentin (NEURONTIN) 300 mg capsule TAKE 1 CAPSULE TWICE A  Cap 4  
 rosuvastatin (CRESTOR) 20 mg tablet TAKE 1 TABLET DAILY 90 Tab 3  
 calcium citrate-vitamin D3 (CITRACAL + D) tablet Take  by mouth two (2) times a day.  B.infantis-B.ani-B.long-B.bifi (PROBIOTIC 4X) 10-15 mg TbEC Take  by mouth.  BIOTIN PO Take 5,000 mcg by mouth daily.  silver sulfADIAZINE (SILVADENE) 1 % topical cream Apply  to affected area daily.  MULTIVITAMIN/IRON/FOLIC ACID (CENTRUM COMPLETE PO) Take  by mouth daily.  ACCU-CHEK LEILA PLUS TEST STRP strip USE TO TEST TWICE DAILY 100 Strip 11  
 fluticasone (FLONASE) 50 mcg/actuation nasal spray nightly.  CETIRIZINE HCL (ZYRTEC PO) Take 10 mg by mouth daily. Review of Systems Constitutional:  She denies fever, weight loss, sweats or fatigue. HEENT:  No blurred or double vision, headache or dizziness. No difficulty with swallowing, taste, speech or smell. Respiratory:  No cough, wheezing or shortness of breath. No sputum production. Cardiac:  Denies chest pain, palpitations, unexplained indigestion, syncope, edema, PND or orthopnea. GI:  No changes in bowel movements, no abdominal pain, no bloating, anorexia, nausea, vomiting or heartburn. :  No frequency or dysuria. Denies incontinence. Extremities:  No joint pain, stiffness or swelling. Skin:  No recent rashes or mole changes. Neurological:  No numbness, tingling, burning paresthesias or loss of motor strength. No syncope, dizziness, frequent headaches or memory loss. Objective:  
 
Vitals:  
 01/24/19 4569 BP: 126/68 Pulse: 66 Resp: 16 Temp: 98 °F (36.7 °C) TempSrc: Oral  
SpO2: 96% Weight: 175 lb 3.2 oz (79.5 kg) Height: 5' 0.25\" (1.53 m) PainSc:   0 - No pain Body mass index is 33.93 kg/m². Physical Examination:  
           General Appearance:  Well-developed, well-nourished, no acute  distress. HEENT:   
 Ears:  The TMs and ear canals were clear. Eyes:  The pupillary responses were normal.  Extraocular muscle function intact. Lids and conjunctiva not injected. Neck:  Supple without thyromegaly or adenopathy. No JVD noted. Lungs:  Clear to auscultation and percussion. Cardiac:  Regular rate and rhythm without murmur. GI: nontender w/o mass. Normal BS's. Extremities:  No clubbing, cyanosis or edema. Skin:  No rash or unusual mole changes noted. Neurological:  Grossly normal.     
 
 
 
Assessment/Plan:  
Impressions: ICD-10-CM ICD-9-CM 1. Type 2 diabetes mellitus without complication, without long-term current use of insulin ([facility]) E11.9 250.00 AMB POC BASIC METABOLIC PANEL  
   AMB POC HEMOGLOBIN A1C  
2. Essential hypertension I10 401.9 AMB POC BASIC METABOLIC PANEL 3. Hyperlipidemia LDL goal <100 E78.5 272.4 AMB POC BASIC METABOLIC PANEL Plan: 1. Continue present meds 2. Discussed lifestyle modifications including Na restriction, low carb/fat diet, weight reduction and exercise (at least a walking program). Follow-up Disposition: Not on File Orders Placed This Encounter  AMB POC BASIC METABOLIC PANEL  
 AMB POC HEMOGLOBIN A1C Moe Padilla MD

## 2019-01-24 NOTE — PROGRESS NOTES
1. Have you been to the ER, urgent care clinic since your last visit? Hospitalized since your last visit? No 
 
2. Have you seen or consulted any other health care providers outside of the [facility] since your last visit? Include any pap smears or colon screening. Yes, Dermatologist, 01-, Dr. Cathy Ghotra, for skin lesion biopsy to scalp. Chief Complaint Patient presents with  Diabetes Follow-up  Hypertension

## 2019-01-25 LAB — HBA1C MFR BLD: 6.6 % (ref 4.8–5.6)

## 2019-01-25 NOTE — PROGRESS NOTES
Patient informed that per Dr Conchis Win A1C great 6.6. Blood sugar, kidney and K+ normal.  No change in Rx.

## 2019-02-15 RX ORDER — CEFUROXIME AXETIL 500 MG/1
500 TABLET ORAL 2 TIMES DAILY
Qty: 20 TAB | Refills: 0 | Status: SHIPPED | OUTPATIENT
Start: 2019-02-15 | End: 2019-02-25

## 2019-02-15 NOTE — TELEPHONE ENCOUNTER
Patient called c/o sinus infection with green discharge from nose,  Congestion x 2 weeks. She states she has been taking Mucinex Sinus Max for 1 week with only a little relief. She also c/o hoarseness. Discussed with Dr. Jasper Pineda. Per read back verbal order, give Ceftin 500mg by mouth twice a day, #20 tablets, 0 Refill. Patient informed. Patient requests medication be called in to [facility] on [address]. Requested Prescriptions     Pending Prescriptions Disp Refills    cefUROXime (CEFTIN) 500 mg tablet 20 Tab 0     Sig: Take 1 Tab by mouth two (2) times a day for 10 days.

## 2019-04-17 PROBLEM — Z79.899 LONG-TERM CURRENT USE OF HIGH RISK MEDICATION OTHER THAN ANTICOAGULANT: Status: ACTIVE | Noted: 2019-04-17

## 2019-04-18 ENCOUNTER — OFFICE VISIT (OUTPATIENT)
Dept: INTERNAL MEDICINE CLINIC | Age: 69
End: 2019-04-18

## 2019-04-18 VITALS
BODY MASS INDEX: 35.42 KG/M2 | SYSTOLIC BLOOD PRESSURE: 138 MMHG | DIASTOLIC BLOOD PRESSURE: 83 MMHG | OXYGEN SATURATION: 98 % | TEMPERATURE: 98 F | HEIGHT: 60 IN | WEIGHT: 180.4 LBS | HEART RATE: 73 BPM

## 2019-04-18 DIAGNOSIS — N18.30 ANEMIA IN STAGE 3 CHRONIC KIDNEY DISEASE (HCC): ICD-10-CM

## 2019-04-18 DIAGNOSIS — I10 ESSENTIAL HYPERTENSION: ICD-10-CM

## 2019-04-18 DIAGNOSIS — E03.9 ACQUIRED HYPOTHYROIDISM: ICD-10-CM

## 2019-04-18 DIAGNOSIS — E11.9 TYPE 2 DIABETES MELLITUS WITHOUT COMPLICATION, WITHOUT LONG-TERM CURRENT USE OF INSULIN (HCC): Primary | ICD-10-CM

## 2019-04-18 DIAGNOSIS — Z98.84 S/P GASTRIC BYPASS: ICD-10-CM

## 2019-04-18 DIAGNOSIS — D63.1 ANEMIA IN STAGE 3 CHRONIC KIDNEY DISEASE (HCC): ICD-10-CM

## 2019-04-18 DIAGNOSIS — E78.5 HYPERLIPIDEMIA LDL GOAL <100: ICD-10-CM

## 2019-04-18 LAB
25(OH)D3 SERPL-MCNC: 45 NG/ML (ref 30–96)
A-G RATIO,AGRAT: 1.5 RATIO
ALBUMIN SERPL-MCNC: 4.4 G/DL (ref 3.9–5.4)
ALP SERPL-CCNC: 93 U/L (ref 38–126)
ALT SERPL-CCNC: 14 U/L (ref 9–52)
ANION GAP SERPL CALC-SCNC: 15 MMOL/L
AST SERPL W P-5'-P-CCNC: 22 U/L (ref 14–36)
BILIRUB SERPL-MCNC: 0.3 MG/DL (ref 0.2–1.3)
BUN SERPL-MCNC: 25 MG/DL (ref 7–17)
BUN/CREATININE RATIO,BUCR: 25 RATIO
CALCIUM SERPL-MCNC: 10 MG/DL (ref 8.4–10.2)
CHLORIDE SERPL-SCNC: 105 MMOL/L (ref 98–107)
CO2 SERPL-SCNC: 29 MMOL/L (ref 22–32)
CREAT SERPL-MCNC: 1 MG/DL (ref 0.7–1.2)
ERYTHROCYTE [DISTWIDTH] IN BLOOD BY AUTOMATED COUNT: 14.6 %
GLOBULIN,GLOB: 2.9
GLUCOSE SERPL-MCNC: 136 MG/DL (ref 65–105)
HCT VFR BLD AUTO: 36.2 % (ref 37–51)
HGB BLD-MCNC: 12.5 G/DL (ref 12–18)
IRON % SATURATION, SAT: 23 % (ref 15–55)
IRON,IRN: 70 UG/DL (ref 37–170)
LYMPHOCYTES ABSOLUTE: 1.7 K/UL (ref 0.6–4.1)
LYMPHOCYTES NFR BLD: 26.2 % (ref 10–58.5)
MCH RBC QN AUTO: 32.1 PG (ref 26–32)
MCHC RBC AUTO-ENTMCNC: 34.5 G/DL (ref 30–36)
MCV RBC AUTO: 92.7 FL (ref 80–97)
MONOCYTES ABS-DIF,2141: 0.4 K/UL (ref 0–1.8)
MONOCYTES NFR BLD: 5.6 % (ref 0.1–24)
NEUTROPHILS # BLD: 68.2 % (ref 37–92)
NEUTROPHILS ABS,2156: 4.5 K/UL (ref 2–7.8)
PLATELET # BLD AUTO: 211 K/UL (ref 140–440)
PMV BLD AUTO: 7.2 FL
POTASSIUM SERPL-SCNC: 4.1 MMOL/L (ref 3.6–5)
PROT SERPL-MCNC: 7.3 G/DL (ref 6.3–8.2)
RBC # BLD AUTO: 3.9 M/UL (ref 4.2–6.3)
SODIUM SERPL-SCNC: 149 MMOL/L (ref 137–145)
TIBC,TIBC: 298 MCG/DL (ref 265–497)
TSH SERPL DL<=0.05 MIU/L-ACNC: 1.05 UIU/ML (ref 0.34–5.6)
WBC # BLD AUTO: 6.6 K/UL (ref 4.1–10.9)

## 2019-04-18 NOTE — PROGRESS NOTES
Chief Complaint Patient presents with  Diabetes  Hypertension  Cholesterol Problem Depression Risk Factor Screening:  
 
3 most recent PHQ Screens 2019 Little interest or pleasure in doing things Not at all Feeling down, depressed, irritable, or hopeless Not at all Total Score PHQ 2 0 Functional Ability and Level of Safety: Activities of Daily Living ADL Assessment 10/19/2018 Feeding yourself No Help Needed Getting from bed to chair No Help Needed Getting dressed No Help Needed Bathing or showering No Help Needed Walk across the room (includes cane/walker) No Help Needed Using the telphone No Help Needed Taking your medications No Help Needed Preparing meals No Help Needed Managing money (expenses/bills) No Help Needed Moderately strenuous housework (laundry) No Help Needed Shopping for personal items (toiletries/medicines) No Help Needed Shopping for groceries No Help Needed Driving No Help Needed Climbing a flight of stairs No Help Needed Getting to places beyond walking distances No Help Needed Fall Risk Fall Risk Assessment, last 12 mths 2018 Able to walk? Yes Fall in past 12 months? No  
 
 
Abuse Screen Abuse Screening Questionnaire 10/19/2018 Do you ever feel afraid of your partner? Kerri Cristino Are you in a relationship with someone who physically or mentally threatens you? Kerri Cristino Is it safe for you to go home? Malinda Alvares Reviewed record in preparation for visit and have obtained necessary documentation. Identified pt with two pt identifiers(name and ). Chief Complaint Patient presents with  Diabetes  Hypertension  Cholesterol Problem Wt Readings from Last 3 Encounters:  
19 180 lb 6.4 oz (81.8 kg) 19 175 lb 3.2 oz (79.5 kg)  
18 171 lb 3.2 oz (77.7 kg) Temp Readings from Last 3 Encounters:  
19 98 °F (36.7 °C) (Oral) 19 98 °F (36.7 °C) (Oral) 11/16/18 98 °F (36.7 °C) (Oral) BP Readings from Last 3 Encounters:  
04/18/19 138/83  
01/24/19 126/68  
11/16/18 122/70 Pulse Readings from Last 3 Encounters:  
04/18/19 73  
01/24/19 66  
11/16/18 65 Coordination of Care Questionnaire: 
:  
 
1) Have you been to an emergency room, urgent care clinic since your last visit? No  
 
Hospitalized since your last visit? No  
 
 
     
 
2) Have you seen or consulted any other health care providers outside of [address] [place] [address] since your last visit? Yes Dr Gregorio Scott, Dr Mi López, Dr Tino Huizar Patient Care Team  
Patient Care Team: 
Alvaro Mondragon MD as PCP - General (Internal Medicine) Ruma Bell MD as Consulting Provider (Endocrinology)

## 2019-04-18 NOTE — ACP (ADVANCE CARE PLANNING)
Discussed Advanced Care Directives. No information on file. Honoring Choices information given to the patient.

## 2019-04-23 LAB
COPPER SERPL-MCNC: NORMAL UG/DL
FERRITIN SERPL-MCNC: 155 NG/ML (ref 15–150)
FOLATE SERPL-MCNC: >20 NG/ML
PREALB SERPL-MCNC: 28 MG/DL (ref 10–36)
PTH-INTACT SERPL-MCNC: 21 PG/ML (ref 15–65)
VIT A SERPL-MCNC: 71.3 UG/DL (ref 22–69.5)
VIT B1 BLD-SCNC: NORMAL NMOL/L
VIT B12 SERPL-MCNC: 450 PG/ML (ref 232–1245)
ZINC SERPL-MCNC: NORMAL UG/DL

## 2019-04-23 NOTE — PROGRESS NOTES
Vitamin D,A, B12 and folate vaishali,l . Liver and kidney function normal.  Hgb and WBC normal.  Thyroid and parathyroid levels normal.  Iron normal.  Didn't get FLP so will do when returns for A1C (make note in lab schedule).   Zinc, copper and Vit B1 were drawn incorrectly and we can try again when returns for A1C  (again make note these need to be frozen)

## 2019-04-26 NOTE — PROGRESS NOTES
PHI verified. Patient informed that Dr. Opal Stein has reviewed lab results and stated Vitamin D,A, B12 and folate vaishali,l . Liver and kidney function normal.  Hgb and WBC normal.  Thyroid and parathyroid levels normal.  Iron normal.  Didn't get FLP so will do when returns for A1C (make note in lab schedule). Zinc, copper and Vit B1 were drawn incorrectly and we can try again when returns for A1C  (again make note these need to be frozen)

## 2019-05-02 ENCOUNTER — LAB ONLY (OUTPATIENT)
Dept: INTERNAL MEDICINE CLINIC | Age: 69
End: 2019-05-02

## 2019-05-02 DIAGNOSIS — D63.1 ANEMIA IN STAGE 4 CHRONIC KIDNEY DISEASE (HCC): ICD-10-CM

## 2019-05-02 DIAGNOSIS — E11.9 TYPE 2 DIABETES MELLITUS WITHOUT COMPLICATION, WITHOUT LONG-TERM CURRENT USE OF INSULIN (HCC): Primary | ICD-10-CM

## 2019-05-02 DIAGNOSIS — Z98.84 S/P GASTRIC BYPASS: ICD-10-CM

## 2019-05-02 DIAGNOSIS — N18.4 ANEMIA IN STAGE 4 CHRONIC KIDNEY DISEASE (HCC): ICD-10-CM

## 2019-05-02 DIAGNOSIS — E78.5 HYPERLIPIDEMIA LDL GOAL <100: ICD-10-CM

## 2019-05-02 LAB
CHOL/HDL RATIO,CHHD: 4 RATIO (ref 0–4)
CHOLEST SERPL-MCNC: 157 MG/DL (ref 0–200)
HDLC SERPL-MCNC: 35 MG/DL (ref 35–130)
LDL/HDL RATIO,LDHD: 2 RATIO
LDLC SERPL CALC-MCNC: 71 MG/DL (ref 0–130)
TRIGL SERPL-MCNC: 257 MG/DL (ref 0–200)
VLDLC SERPL CALC-MCNC: 51 MG/DL

## 2019-05-03 LAB
EST. AVERAGE GLUCOSE BLD GHB EST-MCNC: 146 MG/DL
HBA1C MFR BLD: 6.7 % (ref 4.8–5.6)

## 2019-05-07 PROBLEM — D75.89 MACROCYTOSIS: Status: ACTIVE | Noted: 2019-05-07

## 2019-05-17 ENCOUNTER — APPOINTMENT (OUTPATIENT)
Dept: INTERNAL MEDICINE CLINIC | Age: 69
End: 2019-05-17

## 2019-05-23 NOTE — PROGRESS NOTES
Lipids excellent. A1C excellent at  6.7. Copper and Zinc normal.  Send these results and all previous results that we already called to Dr. Gail Acosta.

## 2019-05-25 LAB
COPPER SERPL-MCNC: 100 UG/DL (ref 72–166)
VIT B1 BLD-SCNC: 222.1 NMOL/L (ref 66.5–200)
ZINC SERPL-MCNC: 98 UG/DL (ref 56–134)

## 2019-05-28 NOTE — PROGRESS NOTES
PHI verified. Patient informed that Dr. Justin Perla has reviewed lab results and stated Lipids excellent. A1C excellent at  6.7. Copper and Zinc normal.  Send these results and all previous results that we already called to Dr. Crystal Pittman. Lab results from 05- and 05- were faxed to Dr. Sobeida Song at [phone].

## 2019-06-04 RX ORDER — LOSARTAN POTASSIUM 100 MG/1
100 TABLET ORAL DAILY
Qty: 90 TAB | Refills: 3 | Status: SHIPPED | OUTPATIENT
Start: 2019-06-04 | End: 2020-08-02 | Stop reason: SDUPTHER

## 2019-06-04 NOTE — TELEPHONE ENCOUNTER
PCP: Sage Ramirez MD    Last appt: 5/17/2019  Future Appointments   Date Time Provider Royer Bowers   8/19/2019  8:00 AM Bola Boateng MD [facility]ERRON       Requested Prescriptions     Pending Prescriptions Disp Refills    losartan (COZAAR) 100 mg tablet 90 Tab 3     Sig: Take 1 Tab by mouth daily.        Prior labs and Blood pressures:  BP Readings from Last 3 Encounters:   04/18/19 138/83   01/24/19 126/68   11/16/18 122/70     Lab Results   Component Value Date/Time    Sodium 149 (H) 04/18/2019 09:45 AM    Potassium 4.1 04/18/2019 09:45 AM    Chloride 105 04/18/2019 09:45 AM    CO2 29.0 04/18/2019 09:45 AM    Anion gap 15 04/18/2019 09:45 AM    Glucose 136 (H) 04/18/2019 09:45 AM    BUN 25.0 (H) 04/18/2019 09:45 AM    Creatinine 1.0 04/18/2019 09:45 AM    BUN/Creatinine ratio 25 04/18/2019 09:45 AM    GFR est AA >60 04/18/2019 09:45 AM    GFR est non-AA 55 04/18/2019 09:45 AM    Calcium 10.0 04/18/2019 09:45 AM     Lab Results   Component Value Date/Time    Hemoglobin A1c 6.7 (H) 05/02/2019 08:47 AM    Hemoglobin A1c (POC) 5.7 10/12/2018 08:26 AM    Hemoglobin A1c, External 7.9 11/05/2015     Lab Results   Component Value Date/Time    Cholesterol, total 157 05/02/2019 08:48 AM    Cholesterol (POC) 146.0 10/12/2018 08:26 AM    HDL Cholesterol 35 05/02/2019 08:48 AM    HDL Cholesterol (POC) 41.0 10/12/2018 08:26 AM    LDL Cholesterol (POC) 54.2 10/12/2018 08:26 AM    LDL, calculated 71 05/02/2019 08:48 AM    VLDL, calculated 62 (H) 11/17/2014 05:17 PM    VLDL 51 05/02/2019 08:48 AM    Triglyceride 257 (H) 05/02/2019 08:48 AM    Triglycerides (POC) 254.0 (A) 10/12/2018 08:26 AM    CHOL/HDL Ratio 4 05/02/2019 08:48 AM     Lab Results   Component Value Date/Time    VITAMIN D, 25-HYDROXY 45 04/18/2019 09:45 AM       Lab Results   Component Value Date/Time    TSH 0.551 11/17/2014 05:17 PM    TSH, 3rd generation 1.05 04/18/2019 09:45 AM

## 2019-06-19 PROBLEM — R63.4 WEIGHT LOSS OF MORE THAN 10% BODY WEIGHT: Status: ACTIVE | Noted: 2019-06-19

## 2019-08-08 RX ORDER — ROSUVASTATIN CALCIUM 20 MG/1
TABLET, COATED ORAL
Qty: 90 TAB | Refills: 3 | Status: SHIPPED | OUTPATIENT
Start: 2019-08-08 | End: 2020-06-29

## 2019-08-08 NOTE — TELEPHONE ENCOUNTER
PCP: Toni Jiménez MD    Last appt: 5/17/2019  Future Appointments   Date Time Provider Royer Bowers   11/7/2019  9:00 AM Blanche Boateng MD [facility]ERR[facility]       Requested Prescriptions     Pending Prescriptions Disp Refills    rosuvastatin (CRESTOR) 20 mg tablet 90 Tab 3     Sig: TAKE 1 TABLET DAILY

## 2019-09-09 DIAGNOSIS — G62.9 NEUROPATHY: Primary | ICD-10-CM

## 2019-09-09 RX ORDER — GABAPENTIN 300 MG/1
300 CAPSULE ORAL DAILY
Qty: 30 CAP | Refills: 0 | Status: SHIPPED | OUTPATIENT
Start: 2019-09-09 | End: 2019-12-12 | Stop reason: SDUPTHER

## 2019-09-09 NOTE — TELEPHONE ENCOUNTER
PCP: Singh Segura MD    Last appt: 5/17/2019  Future Appointments   Date Time Provider Royer Bowers   11/7/2019  9:00 AM Maury Boateng MD [facility]       Requested Prescriptions     Pending Prescriptions Disp Refills    gabapentin (NEURONTIN) 300 mg capsule 180 Cap 4       Prior labs and Blood pressures:  BP Readings from Last 3 Encounters:   04/18/19 138/83   01/24/19 126/68   11/16/18 122/70     Lab Results   Component Value Date/Time    Sodium 149 (H) 04/18/2019 09:45 AM    Potassium 4.1 04/18/2019 09:45 AM    Chloride 105 04/18/2019 09:45 AM    CO2 29.0 04/18/2019 09:45 AM    Anion gap 15 04/18/2019 09:45 AM    Glucose 136 (H) 04/18/2019 09:45 AM    BUN 25.0 (H) 04/18/2019 09:45 AM    Creatinine 1.0 04/18/2019 09:45 AM    BUN/Creatinine ratio 25 04/18/2019 09:45 AM    GFR est AA >60 04/18/2019 09:45 AM    GFR est non-AA 55 04/18/2019 09:45 AM    Calcium 10.0 04/18/2019 09:45 AM     Lab Results   Component Value Date/Time    Hemoglobin A1c 6.7 (H) 05/02/2019 08:47 AM    Hemoglobin A1c (POC) 5.7 10/12/2018 08:26 AM    Hemoglobin A1c, External 7.9 11/05/2015     Lab Results   Component Value Date/Time    Cholesterol, total 157 05/02/2019 08:48 AM    Cholesterol (POC) 146.0 10/12/2018 08:26 AM    HDL Cholesterol 35 05/02/2019 08:48 AM    HDL Cholesterol (POC) 41.0 10/12/2018 08:26 AM    LDL Cholesterol (POC) 54.2 10/12/2018 08:26 AM    LDL, calculated 71 05/02/2019 08:48 AM    VLDL, calculated 62 (H) 11/17/2014 05:17 PM    VLDL 51 05/02/2019 08:48 AM    Triglyceride 257 (H) 05/02/2019 08:48 AM    Triglycerides (POC) 254.0 (A) 10/12/2018 08:26 AM    CHOL/HDL Ratio 4 05/02/2019 08:48 AM     Lab Results   Component Value Date/Time    VITAMIN D, 25-HYDROXY 45 04/18/2019 09:45 AM       Lab Results   Component Value Date/Time    TSH 0.551 11/17/2014 05:17 PM    TSH, 3rd generation 1.05 04/18/2019 09:45 AM

## 2019-09-12 DIAGNOSIS — G62.9 NEUROPATHY: ICD-10-CM

## 2019-09-12 RX ORDER — GABAPENTIN 300 MG/1
300 CAPSULE ORAL DAILY
Qty: 30 CAP | Refills: 0 | Status: CANCELLED | OUTPATIENT
Start: 2019-09-12

## 2019-09-12 NOTE — TELEPHONE ENCOUNTER
Markel Brittany Requested Prescriptions     Pending Prescriptions Disp Refills    gabapentin (NEURONTIN) 300 mg capsule 30 Cap 0     Sig: Take 1 Cap by mouth daily. Max Daily Amount: 300 mg.

## 2019-10-11 ENCOUNTER — HOSPITAL ENCOUNTER (OUTPATIENT)
Dept: MAMMOGRAPHY | Age: 69
Discharge: HOME | End: 2019-10-11
Attending: INTERNAL MEDICINE
Payer: MEDICARE

## 2019-10-11 DIAGNOSIS — Z12.39 BREAST SCREENING: ICD-10-CM

## 2019-10-11 PROCEDURE — 77067 SCR MAMMO BI INCL CAD: CPT

## 2019-10-14 ENCOUNTER — HOSPITAL ENCOUNTER (EMERGENCY)
Age: 69
Discharge: HOME | End: 2019-10-15
Attending: EMERGENCY MEDICINE
Payer: MEDICARE

## 2019-10-14 DIAGNOSIS — E11.65 UNCONTROLLED TYPE 2 DIABETES MELLITUS WITH HYPERGLYCEMIA (HCC): ICD-10-CM

## 2019-10-14 DIAGNOSIS — R10.9 ACUTE ABDOMINAL PAIN: ICD-10-CM

## 2019-10-14 DIAGNOSIS — I10 ACCELERATED HYPERTENSION: ICD-10-CM

## 2019-10-14 DIAGNOSIS — K52.9 ACUTE COLITIS: Primary | ICD-10-CM

## 2019-10-14 DIAGNOSIS — R11.0 NAUSEA WITHOUT VOMITING: ICD-10-CM

## 2019-10-14 PROCEDURE — 96374 THER/PROPH/DIAG INJ IV PUSH: CPT

## 2019-10-14 PROCEDURE — 96375 TX/PRO/DX INJ NEW DRUG ADDON: CPT

## 2019-10-14 PROCEDURE — 99284 EMERGENCY DEPT VISIT MOD MDM: CPT

## 2019-10-14 RX ORDER — ONDANSETRON 2 MG/ML
4 INJECTION INTRAMUSCULAR; INTRAVENOUS
Status: COMPLETED | OUTPATIENT
Start: 2019-10-14 | End: 2019-10-15

## 2019-10-14 RX ORDER — FENTANYL CITRATE 50 UG/ML
50 INJECTION, SOLUTION INTRAMUSCULAR; INTRAVENOUS
Status: COMPLETED | OUTPATIENT
Start: 2019-10-14 | End: 2019-10-15

## 2019-10-15 ENCOUNTER — APPOINTMENT (OUTPATIENT)
Dept: CT IMAGING | Age: 69
End: 2019-10-15
Attending: EMERGENCY MEDICINE
Payer: MEDICARE

## 2019-10-15 VITALS
TEMPERATURE: 99.4 F | WEIGHT: 184.3 LBS | BODY MASS INDEX: 33.92 KG/M2 | SYSTOLIC BLOOD PRESSURE: 154 MMHG | RESPIRATION RATE: 18 BRPM | HEART RATE: 106 BPM | DIASTOLIC BLOOD PRESSURE: 64 MMHG | OXYGEN SATURATION: 96 % | HEIGHT: 62 IN

## 2019-10-15 LAB
ALBUMIN SERPL-MCNC: 4.1 G/DL (ref 3.5–5)
ALBUMIN/GLOB SERPL: 1 {RATIO} (ref 1.1–2.2)
ALP SERPL-CCNC: 108 U/L (ref 45–117)
ALT SERPL-CCNC: 18 U/L (ref 12–78)
ANION GAP SERPL CALC-SCNC: 6 MMOL/L (ref 5–15)
APPEARANCE UR: CLEAR
AST SERPL-CCNC: 17 U/L (ref 15–37)
BACTERIA URNS QL MICRO: NEGATIVE /HPF
BASOPHILS # BLD: 0 K/UL (ref 0–0.1)
BASOPHILS NFR BLD: 0 % (ref 0–1)
BILIRUB SERPL-MCNC: 0.5 MG/DL (ref 0.2–1)
BILIRUB UR QL: NEGATIVE
BUN SERPL-MCNC: 21 MG/DL (ref 6–20)
BUN/CREAT SERPL: 15 (ref 12–20)
CALCIUM SERPL-MCNC: 9.2 MG/DL (ref 8.5–10.1)
CHLORIDE SERPL-SCNC: 105 MMOL/L (ref 97–108)
CO2 SERPL-SCNC: 29 MMOL/L (ref 21–32)
COLOR UR: ABNORMAL
CREAT SERPL-MCNC: 1.4 MG/DL (ref 0.55–1.02)
DIFFERENTIAL METHOD BLD: ABNORMAL
EOSINOPHIL # BLD: 0.1 K/UL (ref 0–0.4)
EOSINOPHIL NFR BLD: 1 % (ref 0–7)
EPITH CASTS URNS QL MICRO: ABNORMAL /LPF
ERYTHROCYTE [DISTWIDTH] IN BLOOD BY AUTOMATED COUNT: 13.2 % (ref 11.5–14.5)
GLOBULIN SER CALC-MCNC: 4.2 G/DL (ref 2–4)
GLUCOSE SERPL-MCNC: 163 MG/DL (ref 65–100)
GLUCOSE UR STRIP.AUTO-MCNC: NEGATIVE MG/DL
HCT VFR BLD AUTO: 35.6 % (ref 35–47)
HGB BLD-MCNC: 12.1 G/DL (ref 11.5–16)
HGB UR QL STRIP: NEGATIVE
HYALINE CASTS URNS QL MICRO: ABNORMAL /LPF (ref 0–5)
IMM GRANULOCYTES # BLD AUTO: 0 K/UL (ref 0–0.04)
IMM GRANULOCYTES NFR BLD AUTO: 0 % (ref 0–0.5)
KETONES UR QL STRIP.AUTO: NEGATIVE MG/DL
LACTATE BLD-SCNC: 0.85 MMOL/L (ref 0.4–2)
LEUKOCYTE ESTERASE UR QL STRIP.AUTO: ABNORMAL
LIPASE SERPL-CCNC: 184 U/L (ref 73–393)
LYMPHOCYTES # BLD: 1.5 K/UL (ref 0.8–3.5)
LYMPHOCYTES NFR BLD: 14 % (ref 12–49)
MCH RBC QN AUTO: 30.7 PG (ref 26–34)
MCHC RBC AUTO-ENTMCNC: 34 G/DL (ref 30–36.5)
MCV RBC AUTO: 90.4 FL (ref 80–99)
MONOCYTES # BLD: 0.7 K/UL (ref 0–1)
MONOCYTES NFR BLD: 7 % (ref 5–13)
NEUTS SEG # BLD: 8.5 K/UL (ref 1.8–8)
NEUTS SEG NFR BLD: 78 % (ref 32–75)
NITRITE UR QL STRIP.AUTO: NEGATIVE
NRBC # BLD: 0 K/UL (ref 0–0.01)
NRBC BLD-RTO: 0 PER 100 WBC
PH UR STRIP: 6.5 [PH] (ref 5–8)
PLATELET # BLD AUTO: 152 K/UL (ref 150–400)
PMV BLD AUTO: 10.4 FL (ref 8.9–12.9)
POTASSIUM SERPL-SCNC: 3.6 MMOL/L (ref 3.5–5.1)
PROT SERPL-MCNC: 8.3 G/DL (ref 6.4–8.2)
PROT UR STRIP-MCNC: 30 MG/DL
RBC # BLD AUTO: 3.94 M/UL (ref 3.8–5.2)
RBC #/AREA URNS HPF: ABNORMAL /HPF (ref 0–5)
SODIUM SERPL-SCNC: 140 MMOL/L (ref 136–145)
SP GR UR REFRACTOMETRY: 1.01 (ref 1–1.03)
UA: UC IF INDICATED,UAUC: ABNORMAL
UROBILINOGEN UR QL STRIP.AUTO: 0.2 EU/DL (ref 0.2–1)
WBC # BLD AUTO: 11 K/UL (ref 3.6–11)
WBC URNS QL MICRO: ABNORMAL /HPF (ref 0–4)

## 2019-10-15 PROCEDURE — 81001 URINALYSIS AUTO W/SCOPE: CPT

## 2019-10-15 PROCEDURE — 74011250636 HC RX REV CODE- 250/636: Performed by: EMERGENCY MEDICINE

## 2019-10-15 PROCEDURE — 80053 COMPREHEN METABOLIC PANEL: CPT

## 2019-10-15 PROCEDURE — 85025 COMPLETE CBC W/AUTO DIFF WBC: CPT

## 2019-10-15 PROCEDURE — 83605 ASSAY OF LACTIC ACID: CPT

## 2019-10-15 PROCEDURE — 36415 COLL VENOUS BLD VENIPUNCTURE: CPT

## 2019-10-15 PROCEDURE — 74011636320 HC RX REV CODE- 636/320: Performed by: EMERGENCY MEDICINE

## 2019-10-15 PROCEDURE — 83690 ASSAY OF LIPASE: CPT

## 2019-10-15 PROCEDURE — 74011250637 HC RX REV CODE- 250/637: Performed by: EMERGENCY MEDICINE

## 2019-10-15 PROCEDURE — 74177 CT ABD & PELVIS W/CONTRAST: CPT

## 2019-10-15 PROCEDURE — 87086 URINE CULTURE/COLONY COUNT: CPT

## 2019-10-15 RX ORDER — SODIUM CHLORIDE 0.9 % (FLUSH) 0.9 %
10 SYRINGE (ML) INJECTION
Status: COMPLETED | OUTPATIENT
Start: 2019-10-15 | End: 2019-10-15

## 2019-10-15 RX ORDER — CIPROFLOXACIN 500 MG/1
500 TABLET ORAL 2 TIMES DAILY
Qty: 14 TAB | Refills: 0 | Status: SHIPPED | OUTPATIENT
Start: 2019-10-15 | End: 2019-10-22

## 2019-10-15 RX ORDER — METRONIDAZOLE 500 MG/1
500 TABLET ORAL 2 TIMES DAILY
Qty: 14 TAB | Refills: 0 | Status: SHIPPED | OUTPATIENT
Start: 2019-10-15 | End: 2019-10-22

## 2019-10-15 RX ORDER — MORPHINE SULFATE 2 MG/ML
2 INJECTION, SOLUTION INTRAMUSCULAR; INTRAVENOUS
Status: COMPLETED | OUTPATIENT
Start: 2019-10-15 | End: 2019-10-15

## 2019-10-15 RX ORDER — OXYCODONE AND ACETAMINOPHEN 5; 325 MG/1; MG/1
1 TABLET ORAL
Qty: 10 TAB | Refills: 0 | Status: SHIPPED | OUTPATIENT
Start: 2019-10-15 | End: 2019-10-20

## 2019-10-15 RX ORDER — DICYCLOMINE HYDROCHLORIDE 20 MG/1
20 TABLET ORAL EVERY 6 HOURS
Qty: 20 TAB | Refills: 0 | Status: SHIPPED | OUTPATIENT
Start: 2019-10-15 | End: 2019-10-20

## 2019-10-15 RX ORDER — DICYCLOMINE HYDROCHLORIDE 10 MG/1
10 CAPSULE ORAL
Status: COMPLETED | OUTPATIENT
Start: 2019-10-15 | End: 2019-10-15

## 2019-10-15 RX ORDER — METRONIDAZOLE 250 MG/1
500 TABLET ORAL
Status: COMPLETED | OUTPATIENT
Start: 2019-10-15 | End: 2019-10-15

## 2019-10-15 RX ORDER — CIPROFLOXACIN 500 MG/1
500 TABLET ORAL
Status: COMPLETED | OUTPATIENT
Start: 2019-10-15 | End: 2019-10-15

## 2019-10-15 RX ADMIN — CIPROFLOXACIN HYDROCHLORIDE 500 MG: 500 TABLET, FILM COATED ORAL at 03:23

## 2019-10-15 RX ADMIN — Medication 10 ML: at 02:10

## 2019-10-15 RX ADMIN — MORPHINE SULFATE 2 MG: 2 INJECTION, SOLUTION INTRAMUSCULAR; INTRAVENOUS at 03:24

## 2019-10-15 RX ADMIN — SODIUM CHLORIDE 1000 ML: 900 INJECTION, SOLUTION INTRAVENOUS at 01:04

## 2019-10-15 RX ADMIN — FENTANYL CITRATE 50 MCG: 50 INJECTION INTRAMUSCULAR; INTRAVENOUS at 01:05

## 2019-10-15 RX ADMIN — METRONIDAZOLE 500 MG: 250 TABLET ORAL at 03:23

## 2019-10-15 RX ADMIN — DICYCLOMINE HYDROCHLORIDE 10 MG: 10 CAPSULE ORAL at 03:23

## 2019-10-15 RX ADMIN — IOPAMIDOL 100 ML: 755 INJECTION, SOLUTION INTRAVENOUS at 02:10

## 2019-10-15 RX ADMIN — ONDANSETRON 4 MG: 2 INJECTION INTRAMUSCULAR; INTRAVENOUS at 01:04

## 2019-10-15 NOTE — ED NOTES
Meds given, tolerated well. Pt. Discharged to home alert and oriented x 3, no distress. With family, iv d/c'd.

## 2019-10-15 NOTE — DISCHARGE INSTRUCTIONS
Thank you for allowing us to take care of you today! We hope we addressed all of your concerns and needs. We strive to provide excellent quality care in the Emergency Department. You will receive a survey after your visit to evaluate the care you were provided. Should you receive a survey from us, we invite you to share your experience and tell us what made it excellent. It was a pleasure serving you, we invite you to share your experience with us, in our pursuit for excellence, should you be selected to receive a survey. The exam and treatment you received in the Emergency Department were for an urgent problem and are not intended as complete care. It is important that you follow up with a doctor, nurse practitioner, or physician assistant for ongoing care. If your symptoms become worse or you do not improve as expected and you are unable to reach your usual health care provider, you should return to the Emergency Department. We are available 24 hours a day. Please take your discharge instructions with you when you go to your follow-up appointment. If you have any problem arranging a follow-up appointment, contact the Emergency Department immediately. If a prescription has been provided, please have it filled as soon as possible to prevent a delay in treatment. Read the entire medication instruction sheet provided to you by the pharmacy. If you have any questions or reservations about taking the medication due to side effects or interactions with other medications, please call your primary care physician or contact the ER to speak with the charge nurse. Make an appointment with your family doctor or the physician you were referred to for follow-up of this visit as instructed on your discharge paperwork, as this is mandatory follow-up. Return to the ER if you are unable to be seen or if you are unable to be seen in a timely manner.     If you have any problem arranging the follow-up visit, contact the Emergency Department immediately. I hope you feel better and thank you again for allow us to provide you with excellent care today at [facility]! Warmest regards,    Leo Vilchis MD  Emergency Medicine Physician  [facility]              Patient Education     Colitis: Care Instructions  Your Care Instructions  Colitis is the medical term for swelling (inflammation) of the intestine. It can be caused by different things, such as an infection or loss of blood flow in the intestine. Other causes are problems like Crohn's disease or ulcerative colitis. Symptoms may include fever, diarrhea that may be bloody, or belly pain. Sometimes symptoms go away without treatment. But you may need treatment or more tests, such as blood tests or a stool test. Or you may need imaging tests like a CT scan or a colonoscopy. In some cases, the doctor may want to test a sample of tissue from the intestine. This test is called a biopsy. The doctor has checked you carefully, but problems can develop later. If you notice any problems or new symptoms, get medical treatment right away. Follow-up care is a key part of your treatment and safety. Be sure to make and go to all appointments, and call your doctor if you are having problems. It's also a good idea to know your test results and keep a list of the medicines you take. How can you care for yourself at home? · Rest until you feel better. · Your doctor may recommend that you eat bland foods. These include rice, dry toast or crackers, bananas, and applesauce. · To prevent dehydration, drink plenty of fluids. Choose water and other caffeine-free clear liquids until you feel better. If you have kidney, heart, or liver disease and have to limit fluids, talk with your doctor before you increase the amount of fluids you drink. · Be safe with medicines. Take your medicines exactly as prescribed. Call your doctor if you think you are having a problem with your medicine. You will get more details on the specific medicines your doctor prescribes. When should you call for help? Call 911 anytime you think you may need emergency care. For example, call if:  · You passed out (lost consciousness). · You vomit blood or what looks like coffee grounds. · Your stools are maroon or very bloody. Call your doctor now or seek immediate medical care if:  · You have new or worse pain. · You have a new or higher fever. · You have new or worse symptoms. · You cannot keep fluids or medicines down. Watch closely for changes in your health, and be sure to contact your doctor if:  · You do not get better as expected. Where can you learn more? Go to [employer]  Enter [id number] in the search box to learn more about \"Colitis: Care Instructions. \"   © 3259-0513 [employer]. Care instructions adapted under license by [facility] (which disclaims liability or warranty for this information). This care instruction is for use with your licensed healthcare professional. If you have questions about a medical condition or this instruction, always ask your healthcare professional. [place] [address] any warranty or liability for your use of this information.   Content Version: 96.7.527187; Current as of: November 20, 2015

## 2019-10-16 LAB
BACTERIA SPEC CULT: NORMAL
CC UR VC: NORMAL
SERVICE CMNT-IMP: NORMAL

## 2019-10-21 NOTE — ED PROVIDER NOTES
EMERGENCY DEPARTMENT HISTORY AND PHYSICAL EXAM      Please note that this dictation was completed with [employer], the computer voice recognition software. Quite often unanticipated grammatical, syntax, homophones, and other interpretive errors are inadvertently transcribed by the computer software. Please disregard these errors and any errors that have escaped final proofreading. Thank you. Date: 10/14/2019  Patient Name: Royce Meadows  Patient Age and Sex: 71 y.o. female    History of Presenting Illness     Chief Complaint   Patient presents with    Abdominal Pain     Patient presents with LLQ pain all day and it has just gotten worst.       History Provided By: Patient    HPI: Royce Meadows, 71 y.o. female with past medical history as documented below presents to the ED with c/o of acute onset of left lower quadrant pain onset this morning that has progressively worsened since initial onset. Patient reports the pain is currently 8 out of 10, throbbing in nature. Patient reports associated nausea but no emesis. Patient does report some associated loose stool but no associated bleeding. She has taken over-the-counter pain medications without significant relief. She denies any radiation of pain. Patient denies any recent sick contacts. Pt denies any other alleviating or exacerbating factors. Additionally, pt specifically denies any recent fever, chills, headache, vomiting, CP, SOB, lightheadedness, dizziness, numbness, weakness, BLE swelling, heart palpitations, urinary sxs, diarrhea, constipation, melena, hematochezia, cough, or congestion. There are no other complaints, changes or physical findings at this time.      PCP: Olga Bryan MD    Past History   Past Medical History:  Past Medical History:   Diagnosis Date    Abnormal kidney function 10/18/2017    Abnormal presence of protein in urine 10/18/2017    Acute serous otitis media of left ear 10/18/2017    Comments: recurrence not specified    Allergic rhinitis     Anemia     Anemia in chronic kidney disease 10/18/2017    Annual physical exam 10/18/2017    Back pain     Basal cell carcinoma     of tip of nose    Boil of trunk 10/18/2017    Bronchitis 10/18/2017    Carcinoma ([address] 10/18/2017    Comments: basal cell nose s/p excision    Chest pain, exertional 10/18/2017    Cough 10/18/2017    Dry mouth     Eczema 10/18/2017    Comments: intrinsic     History of cholecystectomy 10/18/2017    Comments: open 1989    HTN (hypertension) 10/18/2017    Kidney stone     LUIS FERNANDO (obstructive sleep apnea)     Osteopenia     Osteoporosis 10/18/2017    Other and unspecified hyperlipidemia     Pigmentary retinopathy     Type II or unspecified type diabetes mellitus without mention of complication, uncontrolled     Unspecified essential hypertension     Unspecified hypothyroidism     Unspecified vitamin D deficiency        Past Surgical History:  Past Surgical History:   Procedure Laterality Date    COLONOSCOPY N/A 4/10/2018    COLONOSCOPY [facility]    DELIVERY       x2    HX CHOLECYSTECTOMY      HX OTHER SURGICAL  3/10/16    gastric sleeve    HX POLYPECTOMY      HX TONSILLECTOMY         Family History:  Family History   Problem Relation Age of Onset    Diabetes Father     Stroke Father     Cancer Mother         lung    Other Mother         aortic aneurysm       Social History:  Social History     Tobacco Use    Smoking status: Never Smoker    Smokeless tobacco: Never Used   Substance Use Topics    Alcohol use: Yes     Comment: maybe a few times a year at most    Drug use: No       Allergies: Allergies   Allergen Reactions    Niacin Other (comments)     Skin irritation    Sulfa (Sulfonamide Antibiotics) Unknown (comments)    Tricor [Fenofibrate Micronized] Swelling       Current Medications:  No current facility-administered medications on file prior to encounter. Current Outpatient Medications on File Prior to Encounter   Medication Sig Dispense Refill    gabapentin (NEURONTIN) 300 mg capsule Take 1 Cap by mouth daily. Max Daily Amount: 300 mg. 30 Cap 0    rosuvastatin (CRESTOR) 20 mg tablet TAKE 1 TABLET DAILY 90 Tab 3    losartan (COZAAR) 100 mg tablet Take 1 Tab by mouth daily. 90 Tab 3    levothyroxine (SYNTHROID) 88 mcg tablet Take 1 Tab by mouth Daily (before breakfast). 90 Tab 3    b complex vitamins tablet Take 1 Tab by mouth daily.  calcium citrate-vitamin D3 (CITRACAL + D) tablet Take  by mouth two (2) times a day.  B.infantis-B.ani-B.long-B.bifi (PROBIOTIC 4X) 10-15 mg TbEC Take  by mouth.  BIOTIN PO Take 5,000 mcg by mouth daily.  silver sulfADIAZINE (SILVADENE) 1 % topical cream Apply  to affected area daily.  MULTIVITAMIN/IRON/FOLIC ACID (CENTRUM COMPLETE PO) Take  by mouth daily.  ACCU-CHEK LEILA PLUS TEST STRP strip USE TO TEST TWICE DAILY 100 Strip 11    fluticasone (FLONASE) 50 mcg/actuation nasal spray nightly.  CETIRIZINE HCL (ZYRTEC PO) Take 10 mg by mouth daily. Review of Systems   Review of Systems   Constitutional: Negative. Negative for chills and fever. HENT: Negative. Negative for congestion, facial swelling, rhinorrhea, sore throat, trouble swallowing and voice change. Eyes: Negative. Respiratory: Negative. Negative for apnea, cough, chest tightness, shortness of breath and wheezing. Cardiovascular: Negative. Negative for chest pain, palpitations and leg swelling. Gastrointestinal: Positive for abdominal pain and nausea. Negative for abdominal distention, blood in stool, constipation, diarrhea and vomiting. Endocrine: Negative. Negative for cold intolerance, heat intolerance and polyuria. Genitourinary: Negative. Negative for difficulty urinating, dysuria, flank pain, frequency, hematuria and urgency. Musculoskeletal: Negative.   Negative for arthralgias, back pain, myalgias, neck pain and neck stiffness. Skin: Negative. Negative for color change and rash. Neurological: Negative. Negative for dizziness, syncope, facial asymmetry, speech difficulty, weakness, light-headedness, numbness and headaches. Hematological: Negative. Does not bruise/bleed easily. Psychiatric/Behavioral: Negative. Negative for confusion and self-injury. The patient is not nervous/anxious. Physical Exam   Physical Exam   Constitutional: She is oriented to person, place, and time. She appears well-developed and well-nourished. No distress. HENT:   Head: Normocephalic and atraumatic. Mouth/Throat: Oropharynx is clear and moist. No oropharyngeal exudate. Eyes: Pupils are equal, round, and reactive to light. Conjunctivae and EOM are normal.   Neck: Normal range of motion. Cardiovascular: Normal rate, regular rhythm and normal heart sounds. Exam reveals no gallop and no friction rub. No murmur heard. Pulmonary/Chest: Effort normal and breath sounds normal. No respiratory distress. She has no wheezes. She has no rales. She exhibits no tenderness. Abdominal: Soft. Bowel sounds are normal. She exhibits no distension and no mass. There is tenderness (Tenderness to palpation, left lower quadrant, no rebound no guarding). There is no rebound and no guarding. Musculoskeletal: Normal range of motion. She exhibits no edema, tenderness or deformity. Neurological: She is alert and oriented to person, place, and time. She displays normal reflexes. No cranial nerve deficit. She exhibits normal muscle tone. Coordination normal.   Skin: Skin is warm. No rash noted. She is not diaphoretic. Psychiatric: She has a normal mood and affect. Nursing note and vitals reviewed. Diagnostic Study Results     Labs -  No results found for this or any previous visit (from the past 24 hour(s)).      POC LACTIC ACID (Final result)    Component (Lab Inquiry)   Collection Time Result Time [facility] 10/15/19 01:03:00 10/15/19 01:05:40 0.85         Final result                          Contains abnormal data CBC WITH AUTOMATED DIFF (Final result)    Component (Lab Inquiry)   Collection Time Result Time WBC RBC HGB HCT MCV MCH MCHC RDW PLT MPLV   10/15/19 00:58:00 10/15/19 01:13:43 11.0 3.94 12.1 35.6 90.4 30.7 34.0 13.2 152 10.4   Previous Results   04/18/19 09:45:16 04/18/19 10:01:00 6.6 3.90  Results verified by repeat analysisLow  12.5 36.2Low  92.7 32.1High  34.5 14.6 211.0    07/13/14 17:35:00 07/13/14 17:45:07 7.8 3.86 11.4Low  34. 1Low  88.3 29.5 33.4 15. 2High  161    04/05/12 03:20:00 04/05/12 03:30:54 11. 6High  3.82 11.1Low  32.8Low  85.9 29.1 33.8 15. 2High  227        Collection Time Result Time NRBC ANRBC GRANS LYMPH MONOS EOS BASOS IG ABG ABL   10/15/19 00:58:00 10/15/19 01:13:43 0.0 0.00 78High  14 7 1 0 0 8. 5High  1.5   Previous Results   04/18/19 09:45:16 04/18/19 10:01:00    26.2 5.6        07/13/14 17:35:00 07/13/14 17:45:07   71 20 7 2 0  5.5 1.6   04/05/12 03:20:00 04/05/12 03:30:54   78High  15 5 2 0  9.0High  1.7       Collection Time Result Time ABM ALYSON ABB AIG DF   10/15/19 00:58:00 10/15/19 01:13:43 0.7 0.1 0.0 0.0 AUTOMATED   Previous Results   04/18/19 09:45:16 04/18/19 10:01:00        07/13/14 17:35:00 07/13/14 17:45:07 0.6 0.2 0.0     04/05/12 03:20:00 04/05/12 03:30:54 0.6 0.3 0.0         Final result                        Contains abnormal data METABOLIC PANEL, COMPREHENSIVE (Final result)    Component (Lab Inquiry)   Collection Time Result Time NA K CL CO2 AGAP GLU BUN CREA BUCR GFRAA   10/15/19 00:58:00 10/15/19 01:42:11 140 3.6 105 29 6 163High  21High  1. 40High  15 45Low    Previous Results   04/18/19 09:45:16 04/18/19 13:37:00 149High  4.1 105 29.0 15 136High  25. 0High  1.0 25 >60  Using the modified MDR. ..   11/17/14 17:17:00 11/18/14 04:37:00 142 4.2 101 26  101High  19 1. 07High  18 63   07/13/14 17:35:00 07/13/14 18:14:36 139 4.7 103 27 9 256High  36High  1. 75High  21High 36Low    04/05/12 03:20:00 04/05/12 03:57:04 135Low  4.2 99 25 11 333High  35High  1.7High  21High  39Low        Collection Time Result Time GFRNA CA TBIL GPT SGOT AP TP ALB GLOB AGRAT   10/15/19 00:58:00 10/15/19 01:42:11 37  Estimated GFR is calcu. Ventura Antu Ventura Antu Low  9.2 0.5 18 17 108 8. 3High  4.1 4.2High  1.0Low    Previous Results   04/18/19 09:45:16 04/18/19 13:37:00 55  Using the modified MDR. .. 10.0 0.3 14 22.0 93 7.3 4.4 2.90 1.5   11/17/14 17:17:00 11/18/14 04:37:00 55Low  9.9 0.2 22 25 99 6.9 4.3  1.7   07/13/14 17:35:00 07/13/14 18:14:36 29  Estimated GFR is calcu. Ventura Antu Ventura Antu Low  9.2 0.3 34 36 120  (NOTE) Alk Phos new me. Ventura Antu Ventura Antu High  7.9 3.6 4.3High  0.8Low    04/05/12 03:20:00 04/05/12 03:57:04 32  (NOTE) Estimated GFR i. Ventura Antu Ventura Antu Low  8.9 0.3 35 18 142High  7.7 3.7 4.0 0.9Low        Final result                        LIPASE (Final result)    Component (Lab Inquiry)   Collection Time Result Time LPSE   10/15/19 00:58:00 10/15/19 01:42:11 184   Previous Results   07/13/14 17:35:00 07/13/14 18:14:36 319   04/05/12 03:20:00 04/05/12 03:57:04 213         Final result                          Contains abnormal data URINALYSIS W/ REFLEX CULTURE (Final result)    Component (Lab Inquiry)   Collection Time Result Time COLOR APPRN REFSG HARITHA PROTU GLUCU KETU BILU BLDU UROU   10/15/19 00:58:00 10/15/19 01:20:51 YELLOW/STRAW  Color Reference Range:. .. CLEAR 1.015 6.5 30Abnormal  NEGATIVE  NEGATIVE  NEGATIVE  NEGATIVE  0.2   Previous Results   07/13/14 17:15:00 07/13/14 17:57:14 YELLOW/STRAW  Color Reference Range:. .. TURBIDAbnormal  1.021 5.0 100Abnormal  100Abnormal  NEGATIVE  NEGATIVE  LARGEAbnormal  0.2   04/05/12 04:30:00 04/05/12 05:12:15 YELLOW CLOUDY  5.0 TRACEAbnormal  500Abnormal  NEGATIVE  NEGATIVE  LARGEAbnormal  0.2       Collection Time Result Time ANGELICA LEUKU WBCU RBCU EPSU BACTU UAUC HYCST   10/15/19 00:58:00 10/15/19 01:20:51 NEGATIVE  MODERATEAbnormal  20-50 0-5 FEW  Epithelial cell catego. ..  NEGATIVE  URINE CULTURE ORDEREDAbnormal 0-2   Previous Results   07/13/14 17:15:00 07/13/14 17:57:14 NEGATIVE  MODERATEAbnormal  5-10 20-50 FEW NEGATIVE      04/05/12 04:30:00 04/05/12 05:12:15 NEGATIVE  SMALLAbnormal  5-10 >100High  5-10 NEGATIVE  URINE CULTURE ORDERED        Final result                        CULTURE, URINE (Final result)    Component (Lab Inquiry)   Collection Time Result Time SREQ CNT CULT   10/15/19 00:58:00 10/16/19 15:14:23 NO SPECIAL REQUESTS   Reflexed from [id number]  <10,000   COLONIES/mL  NO SIGNIFICANT GROWTH   Previous Results   04/05/12 04:30:00 04/06/12 09:39:27 NO SPECIAL REQUESTS [id number] COLONIES/mL MIXED SKIN BLANCHE ISOLATED         Final result                        Radiologic Studies -   CT ABD PELV W CONT   Final Result   IMPRESSION:   Acute colitis of the descending colon        CT Results  (Last 48 hours)    None        CXR Results  (Last 48 hours)    None          Medical Decision Making   I am the first provider for this patient. I reviewed the vital signs, available nursing notes, past medical history, past surgical history, family history and social history. Vital Signs-Reviewed the patient's vital signs. Visit Vitals  /64   Pulse (!) 106   Temp 99.4 °F (37.4 °C)   Resp 18   Ht 5' 2\" (1.575 m)   Wt 83.6 kg (184 lb 4.9 oz)   SpO2 96%   BMI 33.71 kg/m²     Pulse Oximetry Analysis - 96% on RA    Cardiac Monitor:   Rate: 106 bpm  Rhythm: Sinus Tachycardia      Records Reviewed: Nursing Notes, Old Medical Records, Previous electrocardiograms, Previous Radiology Studies and Previous Laboratory Studies    Provider Notes (Medical Decision Making):   Pt presents with acute abdominal pain; vital signs stable with currently a non-peritoneal exam; DDx includes: Gastroenteritis, hepatitis, pancreatitis, obstruction, appendicitis, viral illness, IBD, diverticulitis, mesenteric ischemia, AAA or descending dissection, ACS, kidney stone.  Will get labs, treat symptomatically and obtain serial abdominal exams to determine if additional imaging is indicated. Will reassess and monitor closely. ED Course:   Initial assessment performed. The patients presenting problems have been discussed, and they are in agreement with the care plan formulated and outlined with them. I have encouraged them to ask questions as they arise throughout their visit. ALCOHOL/SUBSTANCE ABUSE COUNSELING:  Upon evaluation, pt endorsed recent alcohol/illicit drug use. For approximately 15 minutes, pt has been counseled on the dangers of alcohol and illicit drug use on their health, and they were encouraged to quit as soon as possible in order to decrease further risks to their health. Pt has conveyed their understanding of the risks involved should they continue to use these products. HYPERTENSION COUNSELING   Education was provided to the patient today regarding their hypertension. Patient is made aware of their elevated blood pressure and is instructed to follow up this week with their Primary Care for a recheck. Patient is counseled regarding consequences of chronic, uncontrolled hypertension including kidney disease, heart disease, stroke or even death. Patient states their understanding and agrees to follow up this week. Additionally, during their visit, I discussed sodium restriction, maintaining ideal body weight and regular exercise program as physiologic means to achieve blood pressure control. The patient will strive towards this. I reviewed our electronic medical record system for any past medical records that were available that may contribute to the patient's current condition, the nursing notes and vital signs from today's visit.   Johnnie Montiel MD    ED Orders Placed :  Orders Placed This Encounter    CULTURE, URINE    CT ABDOMEN PELVIS WITH CONTRAST    CBC WITH AUTOMATED DIFF    METABOLIC PANEL, COMPREHENSIVE    LIPASE    URINALYSIS W/ REFLEX CULTURE    POC LACTIC ACID    sodium chloride 0.9 % bolus infusion 1,000 mL    fentaNYL citrate (PF) injection 50 mcg    ondansetron (ZOFRAN) injection 4 mg    sodium chloride (NS) flush 10 mL    iopamidol (ISOVUE-370) 76 % injection 100 mL    ciprofloxacin HCl (CIPRO) tablet 500 mg    metroNIDAZOLE (FLAGYL) tablet 500 mg    dicyclomine (BENTYL) capsule 10 mg    morphine injection 2 mg    ciprofloxacin HCl (CIPRO) 500 mg tablet    metroNIDAZOLE (FLAGYL) 500 mg tablet    oxyCODONE-acetaminophen (PERCOCET) 5-325 mg per tablet    dicyclomine (BENTYL) 20 mg tablet     ED Medications Administered:  Medications   sodium chloride 0.9 % bolus infusion 1,000 mL (0 mL IntraVENous IV Completed 10/15/19 0245)   fentaNYL citrate (PF) injection 50 mcg (50 mcg IntraVENous Given 10/15/19 0105)   ondansetron (ZOFRAN) injection 4 mg (4 mg IntraVENous Given 10/15/19 0104)   sodium chloride (NS) flush 10 mL (10 mL IntraVENous Given 10/15/19 0210)   iopamidol (ISOVUE-370) 76 % injection 100 mL (100 mL IntraVENous Given 10/15/19 0210)   ciprofloxacin HCl (CIPRO) tablet 500 mg (500 mg Oral Given 10/15/19 0323)   metroNIDAZOLE (FLAGYL) tablet 500 mg (500 mg Oral Given 10/15/19 0323)   dicyclomine (BENTYL) capsule 10 mg (10 mg Oral Given 10/15/19 0323)   morphine injection 2 mg (2 mg IntraVENous Given 10/15/19 0324)         Progress Note:  I have re-examined the patient. she feels much better and symptoms improved. Tolerating oral intake. Abdomen is soft and without guarding, rebound or other peritoneal signs. I have discussed with patient the importance of close f/u and to return to the ED if symptoms don't improve or worsen. Progress Note:  Patient has been reassessed and reports feeling better and symptoms have improved significantly after ED treatment. Patient feels comfortable going home with close follow-up. Magali Alarcon final labs and imaging have been reviewed with her and available family and/or caregiver. They have been counseled regarding her diagnosis.  She verbally conveys understanding and agreement of the signs, symptoms, diagnosis, treatment and prognosis and additionally agrees to follow up as recommended with Dr. Gavino Ponce MD and/or specialist in 24 - 48 hours. She also agrees with the care-plan we created together and conveys that all of her questions have been answered. I have also put together some discharge instructions for her that include: 1) educational information regarding their diagnosis, 2) how to care for their diagnosis at home, as well a 3) list of reasons why they would want to return to the ED prior to their follow-up appointment should the patient's condition change or symptoms worsen. I have answered all questions to the patient's satisfaction. Strict return precautions given. She both understood and agreed with plan as discussed. Vital signs stable for discharge. Disposition: DISCHARGE  The pt is ready for discharge. The pt's signs, symptoms, diagnosis, and discharge instructions have been discussed and pt has conveyed their understanding. The pt is to follow up as recommended or return to ER should their symptoms worsen. Plan has been discussed and pt is in agreement. PLAN:  1. Return precautions as discussed  2. Discharge Medications as prescribed   Discharge Medication List as of 10/15/2019  3:23 AM      START taking these medications    Details   ciprofloxacin HCl (CIPRO) 500 mg tablet Take 1 Tab by mouth two (2) times a day for 7 days. , Print, Disp-14 Tab, R-0      metroNIDAZOLE (FLAGYL) 500 mg tablet Take 1 Tab by mouth two (2) times a day for 7 days. , Print, Disp-14 Tab, R-0      oxyCODONE-acetaminophen (PERCOCET) 5-325 mg per tablet Take 1 Tab by mouth every eight (8) hours as needed for Pain for up to 5 days. Max Daily Amount: 3 Tabs. Indications: pain, Print, Disp-10 Tab, R-0      dicyclomine (BENTYL) 20 mg tablet Take 1 Tab by mouth every six (6) hours for 20 doses. , Print, Disp-20 Tab, R-0         CONTINUE these medications which have NOT CHANGED    Details   gabapentin (NEURONTIN) 300 mg capsule Take 1 Cap by mouth daily. Max Daily Amount: 300 mg., Print, Disp-30 Cap, R-0      rosuvastatin (CRESTOR) 20 mg tablet TAKE 1 TABLET DAILY, Normal, Disp-90 Tab, R-3      losartan (COZAAR) 100 mg tablet Take 1 Tab by mouth daily. , Normal, Disp-90 Tab, R-3      levothyroxine (SYNTHROID) 88 mcg tablet Take 1 Tab by mouth Daily (before breakfast). , Normal, Disp-90 Tab, R-3      b complex vitamins tablet Take 1 Tab by mouth daily. , Historical Med      calcium citrate-vitamin D3 (CITRACAL + D) tablet Take  by mouth two (2) times a day., Historical Med      B.infantis-B.ani-B.long-B.bifi (PROBIOTIC 4X) 10-15 mg TbEC Take  by mouth., Historical Med      BIOTIN PO Take 5,000 mcg by mouth daily. , Historical Med      silver sulfADIAZINE (SILVADENE) 1 % topical cream Apply  to affected area daily. , Historical Med      MULTIVITAMIN/IRON/FOLIC ACID (CENTRUM COMPLETE PO) Take  by mouth daily. , Historical Med      ACCU-CHEK LEILA PLUS TEST STRP strip USE TO TEST TWICE DAILY, NormalDX: E11.65Disp-100 Strip, R-11, BRENT      fluticasone (FLONASE) 50 mcg/actuation nasal spray nightly., Historical Med      CETIRIZINE HCL (ZYRTEC PO) Take 10 mg by mouth daily. , Historical Med           3. Follow up with PCP and/or specialists as instructed   Follow-up Information     Follow up With Specialties Details Why [address] Halina Lowe MD Internal Medicine   [address]  [address] [address]  [phone]      \[facility]\"" EMERGENCY DEPT Emergency Medicine  As needed, If symptoms worsen [address] [facility] [address]  [address] [facility]    [address] [place] [address] [phone]          Return to ED if worse  Diagnosis     Clinical Impression:   1. Acute colitis    2. Acute abdominal pain    3. Nausea without vomiting    4. Uncontrolled type 2 diabetes mellitus with hyperglycemia ([address]    5.  Accelerated hypertension        Attestation:  I personally performed the services described in this documentation on this date 10/14/2019 for patient, Lena Rodriguez. Edith Small MD      This note will not be viewable in [address]

## 2019-11-27 RX ORDER — LEVOTHYROXINE SODIUM 88 UG/1
88 TABLET ORAL
Qty: 90 TAB | Refills: 3 | Status: SHIPPED | OUTPATIENT
Start: 2019-11-27 | End: 2020-01-07 | Stop reason: SDUPTHER

## 2019-11-27 NOTE — TELEPHONE ENCOUNTER
Requested Prescriptions     Pending Prescriptions Disp Refills    levothyroxine (SYNTHROID) 88 mcg tablet 90 Tab 3     Sig: Take 1 Tab by mouth Daily (before breakfast).        Last Refill: 8/30/19  Next Appointment:12/30/19 Dr. Marin Pappas

## 2019-12-12 DIAGNOSIS — G62.9 NEUROPATHY: ICD-10-CM

## 2019-12-16 RX ORDER — GABAPENTIN 300 MG/1
300 CAPSULE ORAL DAILY
Qty: 30 CAP | Refills: 0 | Status: SHIPPED | OUTPATIENT
Start: 2019-12-16 | End: 2020-01-14 | Stop reason: SDUPTHER

## 2019-12-16 NOTE — TELEPHONE ENCOUNTER
Last Refill: 99/19/  Last visit:5/17/2019    NOV: 12/30/2019    Requested Prescriptions     Pending Prescriptions Disp Refills    gabapentin (NEURONTIN) 300 mg capsule 30 Cap 0     Sig: Take 1 Cap by mouth daily. Max Daily Amount: 300 mg.

## 2019-12-30 ENCOUNTER — OFFICE VISIT (OUTPATIENT)
Dept: INTERNAL MEDICINE CLINIC | Age: 69
End: 2019-12-30

## 2019-12-30 VITALS
WEIGHT: 186.2 LBS | HEIGHT: 62 IN | TEMPERATURE: 98.1 F | BODY MASS INDEX: 34.27 KG/M2 | RESPIRATION RATE: 14 BRPM | SYSTOLIC BLOOD PRESSURE: 132 MMHG | DIASTOLIC BLOOD PRESSURE: 70 MMHG | OXYGEN SATURATION: 99 % | HEART RATE: 81 BPM

## 2019-12-30 DIAGNOSIS — I10 ESSENTIAL HYPERTENSION: ICD-10-CM

## 2019-12-30 DIAGNOSIS — E03.9 ACQUIRED HYPOTHYROIDISM: ICD-10-CM

## 2019-12-30 DIAGNOSIS — Z00.00 ANNUAL PHYSICAL EXAM: ICD-10-CM

## 2019-12-30 DIAGNOSIS — E11.21 TYPE 2 DIABETES WITH NEPHROPATHY (HCC): ICD-10-CM

## 2019-12-30 DIAGNOSIS — E78.5 HYPERLIPIDEMIA LDL GOAL <100: Primary | ICD-10-CM

## 2019-12-30 DIAGNOSIS — M81.0 OSTEOPOROSIS, UNSPECIFIED OSTEOPOROSIS TYPE, UNSPECIFIED PATHOLOGICAL FRACTURE PRESENCE: ICD-10-CM

## 2019-12-30 DIAGNOSIS — N39.0 URINARY TRACT INFECTION WITHOUT HEMATURIA, SITE UNSPECIFIED: ICD-10-CM

## 2019-12-30 DIAGNOSIS — N18.30 ANEMIA IN STAGE 3 CHRONIC KIDNEY DISEASE (HCC): ICD-10-CM

## 2019-12-30 DIAGNOSIS — D63.1 ANEMIA IN STAGE 3 CHRONIC KIDNEY DISEASE (HCC): ICD-10-CM

## 2019-12-30 DIAGNOSIS — E55.9 VITAMIN D DEFICIENCY: ICD-10-CM

## 2019-12-30 DIAGNOSIS — Z98.84 S/P GASTRIC BYPASS: ICD-10-CM

## 2019-12-30 DIAGNOSIS — E11.40 TYPE 2 DIABETES MELLITUS WITH DIABETIC NEUROPATHY, WITHOUT LONG-TERM CURRENT USE OF INSULIN (HCC): ICD-10-CM

## 2019-12-30 DIAGNOSIS — E11.9 TYPE 2 DIABETES MELLITUS WITHOUT COMPLICATION, WITHOUT LONG-TERM CURRENT USE OF INSULIN (HCC): ICD-10-CM

## 2019-12-30 PROBLEM — C80.1 CARCINOMA (HCC): Status: RESOLVED | Noted: 2017-10-18 | Resolved: 2019-12-30

## 2019-12-30 LAB
A-G RATIO,AGRAT: 1.5 RATIO
ALBUMIN SERPL-MCNC: 4.5 G/DL (ref 3.9–5.4)
ALP SERPL-CCNC: 99 U/L (ref 38–126)
ALT SERPL-CCNC: 19 U/L (ref 0–35)
ANION GAP SERPL CALC-SCNC: 14 MMOL/L
AST SERPL W P-5'-P-CCNC: 25 U/L (ref 14–36)
BILIRUB SERPL-MCNC: 0.5 MG/DL (ref 0.2–1.3)
BUN SERPL-MCNC: 21 MG/DL (ref 7–17)
BUN/CREATININE RATIO,BUCR: 19 RATIO
CALCIUM SERPL-MCNC: 9.9 MG/DL (ref 8.4–10.2)
CHLORIDE SERPL-SCNC: 100 MMOL/L (ref 98–107)
CHOL/HDL RATIO,CHHD: 5 RATIO (ref 0–4)
CHOLEST SERPL-MCNC: 184 MG/DL (ref 0–200)
CK SERPL-CCNC: 79 U/L (ref 30–135)
CO2 SERPL-SCNC: 27 MMOL/L (ref 22–32)
CREAT SERPL-MCNC: 1.1 MG/DL (ref 0.7–1.2)
GLOBULIN,GLOB: 3.1
GLUCOSE SERPL-MCNC: 162 MG/DL (ref 65–105)
HBA1C MFR BLD HPLC: 6.8 % (ref 4–5.7)
HDLC SERPL-MCNC: 38 MG/DL (ref 35–130)
IRON,IRN: 67 UG/DL (ref 37–170)
LDL/HDL RATIO,LDHD: 2 RATIO
LDLC SERPL CALC-MCNC: 62 MG/DL (ref 0–130)
POTASSIUM SERPL-SCNC: 4.3 MMOL/L (ref 3.6–5)
PROT SERPL-MCNC: 7.6 G/DL (ref 6.3–8.2)
SODIUM SERPL-SCNC: 141 MMOL/L (ref 137–145)
TRIGL SERPL-MCNC: 422 MG/DL (ref 0–200)
VLDLC SERPL CALC-MCNC: 84 MG/DL

## 2019-12-30 NOTE — PROGRESS NOTES
Chief Complaint   Patient presents with    Annual Wellness Visit    Hypertension     6 mo fu    Diabetes       Depression Risk Factor Screening:     3 most recent PHQ Screens 12/30/2019   Little interest or pleasure in doing things Not at all   Feeling down, depressed, irritable, or hopeless Not at all   Total Score PHQ 2 0       Functional Ability and Level of Safety:     Activities of Daily Living  ADL Assessment 10/19/2018   Feeding yourself No Help Needed   Getting from bed to chair No Help Needed   Getting dressed No Help Needed   Bathing or showering No Help Needed   Walk across the room (includes cane/walker) No Help Needed   Using the telphone No Help Needed   Taking your medications No Help Needed   Preparing meals No Help Needed   Managing money (expenses/bills) No Help Needed   Moderately strenuous housework (laundry) No Help Needed   Shopping for personal items (toiletries/medicines) No Help Needed   Shopping for groceries No Help Needed   Driving No Help Needed   Climbing a flight of stairs No Help Needed   Getting to places beyond walking distances No Help Needed       Fall Risk  Fall Risk Assessment, last 12 mths 12/30/2019   Able to walk? Yes   Fall in past 12 months? No       Abuse Screen  Abuse Screening Questionnaire 10/19/2018   Do you ever feel afraid of your partner? N   Are you in a relationship with someone who physically or mentally threatens you? N   Is it safe for you to go home?  Y         Patient Care Team   Patient Care Team:  Deandra Rivera MD as PCP - General (Hospitalist)  Violeta Espinoza MD as Consulting Provider (Endocrinology)

## 2019-12-30 NOTE — PROGRESS NOTES
This note will not be viewable in [address] Jt Sandra is a 71 y.o. female and presents with Annual Wellness Visit; Hypertension (6 mo fu); and Diabetes      Subjective:  Patient comes in today for annual Medicare wellness visit and follow-up of hypertension, diabetes and hyperlipidemia. She reports she has been doing fairly well. Her weight is up from the last visit and she admits to being a little noncompliant with her diet because of the holiday season. Her blood pressure is well controlled on current meds. Type 2 diabetes, diet controlled. Her last A1c was 6.7. She reports she was on insulin in the past but after the gastric sleeve in 2016 she has been off all medications. She denies numbness or tingling sensation, polyuria polydipsia. Dyslipidemia, on Crestor. Her triglycerides were elevated last time. Denies myalgias or muscle cramps.       Past Medical History:   Diagnosis Date    Abnormal kidney function 10/18/2017    Abnormal presence of protein in urine 10/18/2017    Acute serous otitis media of left ear 10/18/2017    Comments: recurrence not specified    Allergic rhinitis     Anemia     Anemia in chronic kidney disease 10/18/2017    Annual physical exam 10/18/2017    Back pain     Basal cell carcinoma     of tip of nose    Boil of trunk 10/18/2017    Bronchitis 10/18/2017    Carcinoma ([address] 10/18/2017    Comments: basal cell nose s/p excision    Chest pain, exertional 10/18/2017    Cough 10/18/2017    Dry mouth     Eczema 10/18/2017    Comments: intrinsic     History of cholecystectomy 10/18/2017    Comments: open 1989    HTN (hypertension) 10/18/2017    Kidney stone     LUIS FERNANDO (obstructive sleep apnea)     Osteopenia     Osteoporosis 10/18/2017    Other and unspecified hyperlipidemia     Pigmentary retinopathy     Type II or unspecified type diabetes mellitus without mention of complication, uncontrolled     Unspecified essential hypertension     Unspecified hypothyroidism     Unspecified vitamin D deficiency      Past Surgical History:   Procedure Laterality Date    COLONOSCOPY N/A 4/10/2018    COLONOSCOPY performed by Jerry Delgado MD at [facility] ENDOSCOPY    DELIVERY       x2    HX CHOLECYSTECTOMY      HX OTHER SURGICAL  3/10/16    gastric sleeve    HX POLYPECTOMY      HX TONSILLECTOMY       Allergies   Allergen Reactions    Niacin Other (comments)     Skin irritation    Sulfa (Sulfonamide Antibiotics) Unknown (comments)    Tricor [Fenofibrate Micronized] Swelling     Current Outpatient Medications   Medication Sig Dispense Refill    gabapentin (NEURONTIN) 300 mg capsule Take 1 Cap by mouth daily. Max Daily Amount: 300 mg. 30 Cap 0    levothyroxine (SYNTHROID) 88 mcg tablet Take 1 Tab by mouth Daily (before breakfast). 90 Tab 3    rosuvastatin (CRESTOR) 20 mg tablet TAKE 1 TABLET DAILY 90 Tab 3    losartan (COZAAR) 100 mg tablet Take 1 Tab by mouth daily. 90 Tab 3    b complex vitamins tablet Take 1 Tab by mouth daily.  calcium citrate-vitamin D3 (CITRACAL + D) tablet Take  by mouth two (2) times a day.  B.infantis-B.ani-B.long-B.bifi (PROBIOTIC 4X) 10-15 mg TbEC Take  by mouth.  BIOTIN PO Take 5,000 mcg by mouth daily.  silver sulfADIAZINE (SILVADENE) 1 % topical cream Apply  to affected area daily.  MULTIVITAMIN/IRON/FOLIC ACID (CENTRUM COMPLETE PO) Take  by mouth daily.  ACCU-CHEK LEILA PLUS TEST STRP strip USE TO TEST TWICE DAILY 100 Strip 11    fluticasone (FLONASE) 50 mcg/actuation nasal spray nightly.  CETIRIZINE HCL (ZYRTEC PO) Take 10 mg by mouth daily. Social History     Socioeconomic History    Marital status:      Spouse name: Not on file    Number of children: Not on file    Years of education: Not on file    Highest education level: Not on file   Tobacco Use    Smoking status: Never Smoker    Smokeless tobacco: Never Used   Substance and Sexual Activity    Alcohol use:  Yes Comment: maybe a few times a year at most    Drug use: No   Social History Narrative    Lives in [place] with  of 44 years. Has one daughter and one son. Retired   at [employer]. Likes to read and play on the computer. Family History   Problem Relation Age of Onset    Diabetes Father     Stroke Father     Cancer Mother         lung    Other Mother         aortic aneurysm       Review of Systems  ROS is unremarkable except for ones highlighted in bold.    Constitutional: negative for fevers, chills, anorexia and weight loss  Eyes:   negative for visual disturbance and irritation  ENT:   negative for tinnitus,sore throat,nasal congestion,ear pain,hoarseness  Respiratory:  negative for cough, hemoptysis, dyspnea,wheezing  CV:   negative for chest pain, palpitations, lower extremity edema  GI:   negative for nausea, vomiting, diarrhea, abdominal pain,melena  Endo:               negative for polyuria,polydipsia,polyphagia,heat intolerance  Genitourinary: negative for frequency, dysuria and hematuria  Integumentary: negative for rash and pruritus  Hematologic:  negative for easy bruising and gum/nose bleeding  Musculoskel: negative for myalgias, arthralgias, back pain, muscle weakness, joint pain  Neurological:  negative for headaches, dizziness, vertigo, memory problems and gait   Behavl/Psych: negative for feelings of anxiety, depression, mood changes  ROS otherwise negative     Objective:  Visit Vitals  /70 (BP 1 Location: Left arm, BP Patient Position: Sitting)   Pulse 81   Temp 98.1 °F (36.7 °C) (Oral)   Resp 14   Ht 5' 2\" (1.575 m)   Wt 186 lb 3.2 oz (84.5 kg)   LMP  (LMP Unknown)   SpO2 99%   BMI 34.06 kg/m²     Physical Exam:   General appearance - alert, well appearing, and in no distress  Mental status - alert, oriented to person, place, and time  EYE-FADIA, EOMI, fundi normal, corneas normal, no foreign bodies  ENT-ENT exam normal, no neck nodes or sinus tenderness  Nose - normal and patent, no erythema, discharge or polyps  Mouth - mucous membranes moist, pharynx normal without lesions  Neck - supple, no significant adenopathy   Chest - clear to auscultation, no wheezes, rales or rhonchi, symmetric air entry   Heart - normal rate, regular rhythm, normal S1, S2, no murmurs, rubs, clicks or gallops   Abdomen - soft, nontender, nondistended, no masses or organomegaly, healed scar from gastric sleeve  Lymph- no adenopathy palpable  Ext-peripheral pulses normal, no pedal edema, no clubbing or cyanosis  Skin-Warm and dry. no hyperpigmentation, vitiligo, or suspicious lesions  Neuro -alert, oriented, normal speech, no focal findings or movement disorder noted  Musculoskeletal- FROM, no bony abnormalities, no point tenderness    Left Foot: Inspection: normal.  Pulse DP: 2+ (normal). Filament test: normal sensation with micro filament. Vibratory sensation: normal.  Right Foot: Inspection: normal.  Pulse DP: 2+ (normal). Filament test: normal sensation with micro filament.   Vibratory sensation: normal.      Lab Review:  Results for orders placed or performed during the hospital encounter of 10/14/19   CULTURE, URINE   Result Value Ref Range    Special Requests: NO SPECIAL REQUESTS  Reflexed from [id number]        Simpson Count <10,000  COLONIES/mL        Culture result: NO SIGNIFICANT GROWTH     CBC WITH AUTOMATED DIFF   Result Value Ref Range    WBC 11.0 3.6 - 11.0 K/uL    RBC 3.94 3.80 - 5.20 M/uL    HGB 12.1 11.5 - 16.0 g/dL    HCT 35.6 35.0 - 47.0 %    MCV 90.4 80.0 - 99.0 FL    MCH 30.7 26.0 - 34.0 PG    MCHC 34.0 30.0 - 36.5 g/dL    RDW 13.2 11.5 - 14.5 %    PLATELET 756 625 - 447 K/uL    MPV 10.4 8.9 - 12.9 FL    NRBC 0.0 0  WBC    ABSOLUTE NRBC 0.00 0.00 - 0.01 K/uL    NEUTROPHILS 78 (H) 32 - 75 %    LYMPHOCYTES 14 12 - 49 %    MONOCYTES 7 5 - 13 %    EOSINOPHILS 1 0 - 7 %    BASOPHILS 0 0 - 1 %    IMMATURE GRANULOCYTES 0 0.0 - 0.5 %    ABS. NEUTROPHILS 8.5 (H) 1.8 - 8.0 K/UL    ABS. LYMPHOCYTES 1.5 0.8 - 3.5 K/UL    ABS. MONOCYTES 0.7 0.0 - 1.0 K/UL    ABS. EOSINOPHILS 0.1 0.0 - 0.4 K/UL    ABS. BASOPHILS 0.0 0.0 - 0.1 K/UL    ABS. IMM. GRANS. 0.0 0.00 - 0.04 K/UL    DF AUTOMATED     METABOLIC PANEL, COMPREHENSIVE   Result Value Ref Range    Sodium 140 136 - 145 mmol/L    Potassium 3.6 3.5 - 5.1 mmol/L    Chloride 105 97 - 108 mmol/L    CO2 29 21 - 32 mmol/L    Anion gap 6 5 - 15 mmol/L    Glucose 163 (H) 65 - 100 mg/dL    BUN 21 (H) 6 - 20 MG/DL    Creatinine 1.40 (H) 0.55 - 1.02 MG/DL    BUN/Creatinine ratio 15 12 - 20      GFR est AA 45 (L) >60 ml/min/1.73m2    GFR est non-AA 37 (L) >60 ml/min/1.73m2    Calcium 9.2 8.5 - 10.1 MG/DL    Bilirubin, total 0.5 0.2 - 1.0 MG/DL    ALT (SGPT) 18 12 - 78 U/L    AST (SGOT) 17 15 - 37 U/L    Alk. phosphatase 108 45 - 117 U/L    Protein, total 8.3 (H) 6.4 - 8.2 g/dL    Albumin 4.1 3.5 - 5.0 g/dL    Globulin 4.2 (H) 2.0 - 4.0 g/dL    A-G Ratio 1.0 (L) 1.1 - 2.2     LIPASE   Result Value Ref Range    Lipase 184 73 - 393 U/L   URINALYSIS W/ REFLEX CULTURE   Result Value Ref Range    Color YELLOW/STRAW      Appearance CLEAR CLEAR      Specific gravity 1.015 1.003 - 1.030      pH (UA) 6.5 5.0 - 8.0      Protein 30 (A) NEG mg/dL    Glucose NEGATIVE  NEG mg/dL    Ketone NEGATIVE  NEG mg/dL    Bilirubin NEGATIVE  NEG      Blood NEGATIVE  NEG      Urobilinogen 0.2 0.2 - 1.0 EU/dL    Nitrites NEGATIVE  NEG      Leukocyte Esterase MODERATE (A) NEG      WBC 20-50 0 - 4 /hpf    RBC 0-5 0 - 5 /hpf    Epithelial cells FEW FEW /lpf    Bacteria NEGATIVE  NEG /hpf    UA:UC IF INDICATED URINE CULTURE ORDERED (A) CNI      Hyaline cast 0-2 0 - 5 /lpf   POC LACTIC ACID   Result Value Ref Range    Lactic Acid (POC) 0.85 0.40 - 2.00 mmol/L        Documenation Review:    Assessment/Plan:    Diagnoses and all orders for this visit:    1. Hyperlipidemia LDL goal <100  -     LIPID PANEL  -     CK    2.  Acquired hypothyroidism  -     TSH 3RD GENERATION  -     T4, FREE    3. Vitamin D deficiency  -     VITAMIN D, 25 HYDROXY    4. Anemia in stage 3 chronic kidney disease (HCC)  -     IRON    5. Annual physical exam  -     DEXA BONE DENSITY STUDY AXIAL; Future    6. Essential hypertension  -     CBC W/O DIFF  -     URINALYSIS W/MICROSCOPIC  -     METABOLIC PANEL, COMPREHENSIVE    7. Osteoporosis, unspecified osteoporosis type, unspecified pathological fracture presence    8. S/P gastric bypass    9. Type 2 diabetes mellitus without complication, without long-term current use of insulin (HCC)  -     URINE, MICROALBUMIN, SEMIQUANTITATIVE  -     HEMOGLOBIN A1C W/O EAG      Patient is up-to-date on all her health screenings and immunizations. Flu vaccine, Pneumovax 23, Prevnar 13, Zostavax, Tdap is up-to-date. She reports she already got her shingles vaccine this year. Mammogram negative in October 2019. Colonoscopy in April, 2018, status post polypectomy. Should be repeated in 5 years, 2023. Eye examination is current. DEXA scan with normal in 2016. Will need to be repeated every 2 years. Will place order. Continue current meds. I will call with lab results and make further recommendations or adjustments if necessary. Discussed lifestyle modifications including Na restriction, low carb/fat diet, weight reduction and exercise (at least a walking program). ICD-10-CM ICD-9-CM    1. Hyperlipidemia LDL goal <100 E78.5 272.4 LIPID PANEL      CK   2. Acquired hypothyroidism E03.9 244.9 TSH 3RD GENERATION      T4, FREE   3. Vitamin D deficiency E55.9 268.9 VITAMIN D, 25 HYDROXY   4. Anemia in stage 3 chronic kidney disease (HCC) N18.3 285.21 IRON    D63.1 585.3    5. Annual physical exam Z00.00 V70.0 DEXA BONE DENSITY STUDY AXIAL   6. Essential hypertension I10 401.9 CBC W/O DIFF      URINALYSIS W/MICROSCOPIC      METABOLIC PANEL, COMPREHENSIVE   7.  Osteoporosis, unspecified osteoporosis type, unspecified pathological fracture presence M81.0 733.00    8. S/P gastric bypass Z98.84 V45.86    9. Type 2 diabetes mellitus without complication, without long-term current use of insulin (HCC) E11.9 250.00 URINE, MICROALBUMIN, SEMIQUANTITATIVE      HEMOGLOBIN A1C W/O EAG         Follow-up and Dispositions    · Return in about 3 months (around 3/30/2020) for follow up. I have reviewed with the patient details of the assessment and plan and all questions were answered. Relevent patient education was performed. Verbal and/or written instructions (see AVS) provided. The most recent lab findings were reviewed with the patient. Plan was discussed with patient who verbally expressed understanding. An After Visit Summary was printed and given to the patient.     Jacqueline Eddy MD

## 2019-12-30 NOTE — PATIENT INSTRUCTIONS
The best way to stay healthy is to live a healthy lifestyle. A healthy lifestyle includes regular exercise, eating a well-balanced diet, keeping a healthy weight and not smoking. Regular physical exams and screening tests are another important way to take care of yourself. Preventive exams provided by health care providers can find health problems early when treatment works best and can keep you from getting certain diseases or illnesses. Preventive services include exams, lab tests, screenings, shots, monitoring and information to help you take care of your own health. All people over 65 should have a pneumonia shot. Pneumonia shots are usually only needed once in a lifetime unless your doctor decides differently. In addition to your physical exam, some screening tests are recommended:    All people over 65 should have a yearly flu shot. People over 65 are at medium to high risk for Hepatitis B. Three shots are needed for complete protection. Bone mass measurement (dexa scan) is recommended every two years. Diabetes Mellitus screening is recommended every year. Glaucoma is an eye disease caused by high pressure in the eye. An eye exam is recommended every year. Cardiovascular screening tests that check your cholesterol and other blood fat (lipid) levels are recommended every five years. Colorectal Cancer screening tests help to find pre-cancerous polyps (growths in the colon) so they can be removed before they turn into cancer. Tests ordered for screening depend on your personal and family history risk factors. Prostate Cancer Screening (annually up to age 76)    Screening for breast cancer is recommended yearly with a Mammogram.    Screening for cervical and vaginal cancer is recommended with a pelvic and Pap test every two years.  However if you have had an abnormal pap in the past  three years or at high risk for cervical or vaginal cancer Medicare will cover a pap test and a pelvic exam every year.      Here is a list of your current Health Maintenance items with a due date:  Health Maintenance Due   Topic Date Due    DTaP/Tdap/Td  (1 - Tdap) [birth year]/1961    Stool testing for trace blood  08/15/2000    Diabetic Foot Care  11/17/2016    Annual Well Visit  03/14/2018    Albumin Urine Test  10/12/2019    Hemoglobin A1C    11/02/2019

## 2019-12-31 DIAGNOSIS — E11.9 TYPE 2 DIABETES MELLITUS WITHOUT COMPLICATION, WITHOUT LONG-TERM CURRENT USE OF INSULIN (HCC): Primary | ICD-10-CM

## 2019-12-31 DIAGNOSIS — E78.2 MIXED HYPERLIPIDEMIA: ICD-10-CM

## 2019-12-31 LAB
25(OH)D3 SERPL-MCNC: 43 NG/ML (ref 30–96)
BACTERIA,BACTU: ABNORMAL
BILIRUB UR QL: NEGATIVE
CLARITY: ABNORMAL
COLOR UR: ABNORMAL
ERYTHROCYTE [DISTWIDTH] IN BLOOD BY AUTOMATED COUNT: 13.5 % (ref 12.3–15.4)
GLUCOSE 24H UR-MRATE: NEGATIVE G/(24.H)
HCT VFR BLD AUTO: 35.9 % (ref 34–46.6)
HGB BLD-MCNC: 12.3 G/DL (ref 11.1–15.9)
HGB UR QL STRIP: ABNORMAL
KETONES UR QL STRIP.AUTO: NEGATIVE
LEUKOCYTE ESTERASE: ABNORMAL
MCH RBC QN AUTO: 30.4 PG (ref 26.6–33)
MCHC RBC AUTO-ENTMCNC: 34.3 G/DL (ref 31.5–35.7)
MCV RBC AUTO: 89 FL (ref 79–97)
MICROALBUMIN, URINE: 100 MG/L (ref 0–20)
NITRITE UR QL STRIP.AUTO: NEGATIVE
PH UR STRIP: 5 [PH] (ref 5–7)
PLATELET # BLD AUTO: 183 X10E3/UL (ref 150–450)
PROT UR STRIP-MCNC: ABNORMAL MG/DL
RBC # BLD AUTO: 4.05 X10E6/UL (ref 3.77–5.28)
RBC #/AREA URNS HPF: ABNORMAL #/HPF
SP GR UR REFRACTOMETRY: 1.03 (ref 1–1.03)
SQUAMOUS EPITHELIAL CELLS: ABNORMAL
T4 FREE SERPL-MCNC: 1.05 NG/DL (ref 0.58–2.3)
TSH SERPL DL<=0.05 MIU/L-ACNC: 1.78 UIU/ML (ref 0.34–5.6)
UROBILINOGEN UR QL STRIP.AUTO: NEGATIVE
WBC # BLD AUTO: 7 X10E3/UL (ref 3.4–10.8)
WBC URNS QL MICRO: ABNORMAL #/HPF

## 2019-12-31 RX ORDER — METFORMIN HYDROCHLORIDE 500 MG/1
1 TABLET, FILM COATED, EXTENDED RELEASE ORAL
Qty: 45000 MG | Refills: 0 | Status: SHIPPED | OUTPATIENT
Start: 2019-12-31 | End: 2020-01-07 | Stop reason: SDUPTHER

## 2019-12-31 NOTE — PROGRESS NOTES
Patient is type II diabetic. Her A1c has been slowly creeping up. Most recent A1c 6.8. I would like to start metformin 1 tablet daily and recheck A1c in 3 months. Send a prescription to her preferred pharmacy. Patient has hyperlipidemia with hypertriglyceridemia. Her triglycerides have doubled. I have sent in prescription for fish oil with omega fatty acids to her preferred pharmacy. We will recheck fasting lipid profile in 3 months. Would like to see patient in follow-up in 3 months with fasting labs. Please call and let patient know above.

## 2020-01-02 LAB — BACTERIA UR CULT: NORMAL

## 2020-01-02 NOTE — PROGRESS NOTES
Patient is type II diabetic.  Her A1c has been slowly creeping up.  Most recent A1c 6.8.  I would like to start metformin 1 tablet daily and recheck A1c in 3 months.  Send a prescription to her preferred pharmacy. Patient has hyperlipidemia with hypertriglyceridemia.  Her triglycerides have doubled.  I have sent in prescription for fish oil with omega fatty acids to her preferred pharmacy. Tara Metz will recheck fasting lipid profile in 3 months. Would like to see patient in follow-up in 3 months with fasting labs. Please call and let patient know above.

## 2020-01-07 DIAGNOSIS — E11.9 TYPE 2 DIABETES MELLITUS WITHOUT COMPLICATION, WITHOUT LONG-TERM CURRENT USE OF INSULIN (HCC): ICD-10-CM

## 2020-01-07 NOTE — TELEPHONE ENCOUNTER
Requested Prescriptions     Pending Prescriptions Disp Refills    metFORMIN (GLUMETZA ER) 500 mg TG24 24 hour tablet 90 Tab 0     Sig: Take 500 mg by mouth daily (with breakfast) for 90 days. Indications: type 2 diabetes mellitus    levothyroxine (SYNTHROID) 88 mcg tablet 90 Tab 1     Sig: Take 1 Tab by mouth Daily (before breakfast) for 90 days. Patient has only 3 days of levothyroxine left. Last seen 12/30/19 by you.

## 2020-01-08 RX ORDER — METFORMIN HYDROCHLORIDE 500 MG/1
1 TABLET, FILM COATED, EXTENDED RELEASE ORAL
Qty: 90 TAB | Refills: 0 | Status: SHIPPED | OUTPATIENT
Start: 2020-01-08 | End: 2020-01-15 | Stop reason: ALTCHOICE

## 2020-01-08 RX ORDER — LEVOTHYROXINE SODIUM 88 UG/1
88 TABLET ORAL
Qty: 90 TAB | Refills: 1 | Status: SHIPPED | OUTPATIENT
Start: 2020-01-08 | End: 2020-01-28 | Stop reason: SDUPTHER

## 2020-01-14 DIAGNOSIS — G62.9 NEUROPATHY: ICD-10-CM

## 2020-01-14 NOTE — TELEPHONE ENCOUNTER
Last Refill: 12/16/19  Last visit:12/30/2019    NOV: 3/30/2020    Requested Prescriptions     Pending Prescriptions Disp Refills    gabapentin (NEURONTIN) 300 mg capsule 90 Cap 2     Sig: Take 1 Cap by mouth daily. Max Daily Amount: 300 mg.

## 2020-01-15 ENCOUNTER — TELEPHONE (OUTPATIENT)
Dept: INTERNAL MEDICINE CLINIC | Age: 70
End: 2020-01-15

## 2020-01-15 DIAGNOSIS — E11.9 TYPE 2 DIABETES MELLITUS WITHOUT COMPLICATION, WITHOUT LONG-TERM CURRENT USE OF INSULIN (HCC): Primary | ICD-10-CM

## 2020-01-15 RX ORDER — METFORMIN HYDROCHLORIDE 500 MG/1
500 TABLET ORAL
Qty: 60 TAB | Refills: 0 | Status: SHIPPED | OUTPATIENT
Start: 2020-01-15 | End: 2020-02-17 | Stop reason: SDUPTHER

## 2020-01-15 RX ORDER — GABAPENTIN 300 MG/1
300 CAPSULE ORAL DAILY
Qty: 90 CAP | Refills: 2 | Status: SHIPPED | COMMUNITY
Start: 2020-01-15 | End: 2020-06-14 | Stop reason: SDUPTHER

## 2020-01-15 NOTE — TELEPHONE ENCOUNTER
[facility] called in to let physican know that the Metformin 500mg was not covered. Stated that the Metformin 500-750mg and \"Plain\" Metformin 850-1000mg can be covered. Stated she left a voicemail on 1/10/2020 but no call was returned. Can send in a electronic scripts if need.

## 2020-01-16 NOTE — TELEPHONE ENCOUNTER
Rosio Koo MD  You 20 hours ago (1:35 PM)      Please let patient know that I will send in a prescription for the plain Metformin 500 mg p.o. daily for the first 2 weeks thereafter she needs to take 500 mg p.o. twice daily. Please make sure patient understands she might for some GI disturbances which includes nausea, diarrhea, bloating for the first 2 to 3 weeks prior to symptoms showed resolved over a few weeks.     Routing comment      Patient notified

## 2020-01-28 RX ORDER — LEVOTHYROXINE SODIUM 88 UG/1
88 TABLET ORAL
Qty: 90 TAB | Refills: 1 | Status: SHIPPED | OUTPATIENT
Start: 2020-01-28 | End: 2020-04-27

## 2020-01-28 NOTE — TELEPHONE ENCOUNTER
Last Refill: 4/7/20  Last visit:12/30/2019    NOV: 3/30/2020    Requested Prescriptions     Pending Prescriptions Disp Refills    levothyroxine (SYNTHROID) 88 mcg tablet 90 Tab 1     Sig: Take 1 Tab by mouth Daily (before breakfast) for 90 days.

## 2020-01-28 NOTE — TELEPHONE ENCOUNTER
levothyroxine (SYNTHROID) 88 mcg tablet  Please send new prescription to [facility] at [facility].  90 day supply please

## 2020-02-17 DIAGNOSIS — E11.9 TYPE 2 DIABETES MELLITUS WITHOUT COMPLICATION, WITHOUT LONG-TERM CURRENT USE OF INSULIN (HCC): ICD-10-CM

## 2020-02-17 RX ORDER — METFORMIN HYDROCHLORIDE 500 MG/1
500 TABLET ORAL
Qty: 90 TAB | Refills: 2 | Status: SHIPPED | OUTPATIENT
Start: 2020-02-17 | End: 2020-03-18

## 2020-02-17 NOTE — TELEPHONE ENCOUNTER
Requested Prescriptions     Pending Prescriptions Disp Refills    metFORMIN (GLUCOPHAGE) 500 mg tablet 90 Tab 2     Sig: Take 1 Tab by mouth daily (with breakfast) for 30 days.  Indications: type 2 diabetes mellitus

## 2020-02-17 NOTE — TELEPHONE ENCOUNTER
Please send 90 day supply of Metformin  mg refill to [facility]. Pt stated it is a little early however [facility] takes a long time to get the Rx to her so need plenty of lead time.

## 2020-06-09 ENCOUNTER — OFFICE VISIT (OUTPATIENT)
Dept: INTERNAL MEDICINE CLINIC | Age: 70
End: 2020-06-09

## 2020-06-09 VITALS
TEMPERATURE: 98.4 F | DIASTOLIC BLOOD PRESSURE: 88 MMHG | HEART RATE: 93 BPM | HEIGHT: 62 IN | SYSTOLIC BLOOD PRESSURE: 150 MMHG | BODY MASS INDEX: 34.6 KG/M2 | WEIGHT: 188 LBS | RESPIRATION RATE: 16 BRPM | OXYGEN SATURATION: 93 %

## 2020-06-09 DIAGNOSIS — I10 ESSENTIAL HYPERTENSION: ICD-10-CM

## 2020-06-09 DIAGNOSIS — E03.9 ACQUIRED HYPOTHYROIDISM: ICD-10-CM

## 2020-06-09 DIAGNOSIS — Z98.84 S/P GASTRIC BYPASS: ICD-10-CM

## 2020-06-09 DIAGNOSIS — M81.0 OSTEOPOROSIS, UNSPECIFIED OSTEOPOROSIS TYPE, UNSPECIFIED PATHOLOGICAL FRACTURE PRESENCE: ICD-10-CM

## 2020-06-09 DIAGNOSIS — E11.9 TYPE 2 DIABETES MELLITUS WITHOUT COMPLICATION, WITHOUT LONG-TERM CURRENT USE OF INSULIN (HCC): Primary | ICD-10-CM

## 2020-06-09 DIAGNOSIS — E78.5 HYPERLIPIDEMIA LDL GOAL <100: ICD-10-CM

## 2020-06-09 RX ORDER — LEVOTHYROXINE SODIUM 88 UG/1
TABLET ORAL
COMMUNITY
Start: 2020-05-26 | End: 2020-11-16 | Stop reason: SDUPTHER

## 2020-06-09 RX ORDER — METFORMIN HYDROCHLORIDE 500 MG/1
500 TABLET, EXTENDED RELEASE ORAL 2 TIMES DAILY WITH MEALS
Qty: 180 TAB | Refills: 0 | Status: SHIPPED | OUTPATIENT
Start: 2020-06-09 | End: 2020-09-08 | Stop reason: SDUPTHER

## 2020-06-09 NOTE — PROGRESS NOTES
This note will not be viewable in [address] Isadora Nguyen is a 71 y.o. female and presents with Hypertension (3 mo fu) and Diabetes      Subjective: For follow-up of her chronic medical problems which include diabetes, hypertension hyperlipidemia  Last A1c had slowly creeped up to 6.8. I started her on metformin 500 mg p.o. daily and had instructed her to increase it to twice daily dosing. She reports she is only taking it in the morning. She has not been checking her blood sugar regularly. She denies polyuria, polydipsia. Denies hypoglycemic events. She is tolerating the medication well. She is on gabapentin for intermittent numbness and tingling sensation bilateral feet. Hypertriglyceridemia and hyperlipidemia. Recommended to start omega-3 fatty acids over-the-counter in addition to her Crestor. Hypothyroidism, on replacement. Levothyroxine 88 mcg. Last TSH was within normal limits. Blood pressure well controlled on current meds.         Past Medical History:   Diagnosis Date    Abnormal kidney function 10/18/2017    Abnormal presence of protein in urine 10/18/2017    Acute serous otitis media of left ear 10/18/2017    Comments: recurrence not specified    Allergic rhinitis     Anemia     Anemia in chronic kidney disease 10/18/2017    Annual physical exam 10/18/2017    Back pain     Basal cell carcinoma     of tip of nose    Boil of trunk 10/18/2017    Bronchitis 10/18/2017    Carcinoma ([facility] [address] 10/18/2017    Comments: basal cell nose s/p excision    Chest pain, exertional 10/18/2017    Cough 10/18/2017    Dry mouth     Eczema 10/18/2017    Comments: intrinsic     History of cholecystectomy 10/18/2017    Comments: open 1989    HTN (hypertension) 10/18/2017    Kidney stone     LUIS FERNANDO (obstructive sleep apnea)     Osteopenia     Osteoporosis 10/18/2017    Other and unspecified hyperlipidemia     Pigmentary retinopathy     Type II or unspecified type diabetes mellitus without mention of complication, uncontrolled     Unspecified essential hypertension     Unspecified hypothyroidism     Unspecified vitamin D deficiency      Past Surgical History:   Procedure Laterality Date    COLONOSCOPY N/A 4/10/2018    COLONOSCOPY performed by Papito Martini [facility] at [facility] ENDOSCOPY    DELIVERY       x2    HX CHOLECYSTECTOMY      HX OTHER SURGICAL  3/10/16    gastric sleeve    HX POLYPECTOMY      HX TONSILLECTOMY       Allergies   Allergen Reactions    Niacin Other (comments)     Skin irritation    Sulfa (Sulfonamide Antibiotics) Unknown (comments)    Tricor [Fenofibrate Micronized] Swelling     Current Outpatient Medications   Medication Sig Dispense Refill    levothyroxine (SYNTHROID) 88 mcg tablet TK 1 T PO D B ELIN      metFORMIN ER (GLUCOPHAGE XR) 500 mg tablet Take 1 Tab by mouth two (2) times daily (with meals) for 90 days. 180 Tab 0    gabapentin (NEURONTIN) 300 mg capsule Take 1 Cap by mouth daily. Max Daily Amount: 300 mg. 90 Cap 2    rosuvastatin (CRESTOR) 20 mg tablet TAKE 1 TABLET DAILY 90 Tab 3    losartan (COZAAR) 100 mg tablet Take 1 Tab by mouth daily. 90 Tab 3    calcium citrate-vitamin D3 (CITRACAL + D) tablet Take  by mouth two (2) times a day.  B.infantis-B.ani-B.long-B.bifi (PROBIOTIC 4X) 10-15 mg TbEC Take  by mouth.  silver sulfADIAZINE (SILVADENE) 1 % topical cream Apply  to affected area daily.  MULTIVITAMIN/IRON/FOLIC ACID (CENTRUM COMPLETE PO) Take  by mouth daily.  ACCU-CHEK LEILA PLUS TEST STRP strip USE TO TEST TWICE DAILY 100 Strip 11    fluticasone (FLONASE) 50 mcg/actuation nasal spray nightly.  CETIRIZINE HCL (ZYRTEC PO) Take 10 mg by mouth daily.        Social History     Socioeconomic History    Marital status:      Spouse name: Not on file    Number of children: Not on file    Years of education: Not on file    Highest education level: Not on file   Tobacco Use    Smoking status: Never Smoker  Smokeless tobacco: Never Used   Substance and Sexual Activity    Alcohol use: Yes     Comment: maybe a few times a year at most    Drug use: No   Social History Narrative    Lives in [place] with  of 44 years. Has one daughter and one son. Retired   at [employer]. Likes to read and play on the computer. Family History   Problem Relation Age of Onset    Diabetes Father     Stroke Father     Cancer Mother         lung    Other Mother         aortic aneurysm       Review of Systems  ROS is unremarkable except for ones highlighted in bold.    Constitutional: negative for fevers, chills, anorexia and weight loss  Eyes:   negative for visual disturbance and irritation  ENT:   negative for tinnitus,sore throat,nasal congestion,ear pain,hoarseness  Respiratory:  negative for cough, hemoptysis, dyspnea,wheezing  CV:   negative for chest pain, palpitations, lower extremity edema  GI:   negative for nausea, vomiting, diarrhea, abdominal pain,melena  Endo:               negative for polyuria,polydipsia,polyphagia,heat intolerance  Genitourinary: negative for frequency, dysuria and hematuria  Integumentary: negative for rash and pruritus  Hematologic:  negative for easy bruising and gum/nose bleeding  Musculoskel: negative for myalgias, arthralgias, back pain, muscle weakness, joint pain  Neurological:  negative for headaches, dizziness, vertigo, memory problems and gait   Behavl/Psych: negative for feelings of anxiety, depression, mood changes  ROS otherwise negative     Objective:  Visit Vitals  /88 (BP 1 Location: Left arm, BP Patient Position: Sitting)   Pulse 93   Temp 98.4 °F (36.9 °C) (Oral)   Resp 16   Ht 5' 2\" (1.575 m)   Wt 188 lb (85.3 kg)   LMP  (LMP Unknown)   SpO2 93%   BMI 34.39 kg/m²     Physical Exam:   General appearance - alert, well appearing, and in no distress  Mental status - alert, oriented to person, place, and time  EYE-FADIA, EOMI, fundi normal, corneas normal, no foreign bodies  ENT-ENT exam normal, no neck nodes or sinus tenderness  Nose - normal and patent, no erythema, discharge or polyps  Mouth - mucous membranes moist, pharynx normal without lesions  Neck - supple, no significant adenopathy   Chest - clear to auscultation, no wheezes, rales or rhonchi, symmetric air entry   Heart - normal rate, regular rhythm, normal S1, S2, no murmurs, rubs, clicks or gallops   Abdomen - soft, nontender, nondistended, no masses or organomegaly, healed scar from gastric sleeve  Lymph- no adenopathy palpable  Ext-peripheral pulses normal, no pedal edema, no clubbing or cyanosis  Skin-Warm and dry. no hyperpigmentation, vitiligo, or suspicious lesions  Neuro -alert, oriented, normal speech, no focal findings or movement disorder noted  Musculoskeletal- FROM, no bony abnormalities, no point tenderness        Lab Review:  Results for orders placed or performed in visit on 12/30/19   CULTURE, URINE   Result Value Ref Range    Urine Culture, Routine       Culture shows less than 10,000 colony forming units of bacteria per  milliliter of urine. This colony count is not generally considered  to be clinically significant.      CBC W/O DIFF   Result Value Ref Range    WBC 7.0 3.4 - 10.8 x10E3/uL    RBC 4.05 3.77 - 5.28 x10E6/uL    HGB 12.3 11.1 - 15.9 g/dL    HCT 35.9 34.0 - 46.6 %    MCV 89 79 - 97 fL    MCH 30.4 26.6 - 33.0 pg    MCHC 34.3 31.5 - 35.7 g/dL    RDW 13.5 12.3 - 15.4 %    PLATELET 616 216 - 842 x10E3/uL   TSH 3RD GENERATION   Result Value Ref Range    TSH, 3rd generation 1.78 0.34 - 5.60 uIU/mL   T4, FREE   Result Value Ref Range    T4, Free 1.05 0.58 - 2.30 ng/dL   VITAMIN D, 25 HYDROXY   Result Value Ref Range    VITAMIN D, 25-HYDROXY 43 30 - 96 ng/mL   URINALYSIS W/MICROSCOPIC   Result Value Ref Range    Color daniella (A) Pale Yellow - Yellow    CLARITY sl.cloudy (A) Clear    Glucose urine, 24 hr Negative Negative    Ketone Negative Negative    Bilirubin Negative Negative    Specific gravity 1.030 1.000 - 1.030    pH (UA) 5 5 - 7    Blood 1+ (A) Negative    Protein 2+ (A) Negative    Urobilinogen Negative Negative    Nitrites Negative Negative    Leukocyte Esterase 3+ (A) Negative    WBC TNTC (A) 0 #/HPF    RBC 2-5 (A) 0 #/HPF    Bacteria Moderate (A) None Seen    SQUAMOUS EPITHELIAL CELLS Occasional (A) None Seen   URINE, MICROALBUMIN, SEMIQUANTITATIVE   Result Value Ref Range    Microalbumin, Urine 100 (A) 0 - 20 mg/L   IRON   Result Value Ref Range    Iron 67 37 - 170 ug/dL   LIPID PANEL   Result Value Ref Range    Cholesterol, total 184 0 - 200 mg/dL    Triglyceride 422 (H) 0 - 200 mg/dL    HDL Cholesterol 38 35 - 130 mg/dL    VLDL 84 mg/dL    LDL, calculated 62 0 - 130 mg/dL    CHOL/HDL Ratio 5 (H) 0 - 4 Ratio    LDL/HDL Ratio 2 Ratio   METABOLIC PANEL, COMPREHENSIVE   Result Value Ref Range    Glucose 162 (H) 65 - 105 mg/dL    BUN 21.0 (H) 7.0 - 17.0 mg/dL    Creatinine 1.1 0.7 - 1.2 mg/dL    Sodium 141 137 - 145 mmol/L    Potassium 4.3 3.6 - 5.0 mmol/L    Chloride 100 98 - 107 mmol/L    CO2 27.0 22.0 - 32.0 mmol/L    Calcium 9.9 8.4 - 10.2 mg/dl    Protein, total 7.6 6.3 - 8.2 g/dL    Albumin 4.5 3.9 - 5.4 g/dL    ALT (SGPT) 19 0 - 35 U/L    AST (SGOT) 25.0 14.0 - 36.0 U/L    Alk. phosphatase 99 38 - 126 U/L    Bilirubin, total 0.5 0.2 - 1.3 mg/dL    BUN/Creatinine ratio 19 Ratio    GFR est AA 60 (H) <60 mL/min/1.73m2    GFR est non-AA 49 <60 mL/min/1.73m2    Globulin 3.10     A-G Ratio 1.5 Ratio    Anion gap 14 mmol/L   CK   Result Value Ref Range    CK 79.00 30.00 - 135.00 U/L   HEMOGLOBIN A1C W/O EAG   Result Value Ref Range    Hemoglobin A1c 6.8 (H) 4.0 - 5.7 %        Documenation Review:    Assessment/Plan:    Diagnoses and all orders for this visit:    1.  Type 2 diabetes mellitus without complication, without long-term current use of insulin (HCC)  -     HEMOGLOBIN A1C W/O EAG  -     MICROALBUMIN, UR, RAND W/ MICROALB/CREAT RATIO  -     metFORMIN ER (GLUCOPHAGE XR) 500 mg tablet; Take 1 Tab by mouth two (2) times daily (with meals) for 90 days. 2. Hyperlipidemia LDL goal <100  -     LIPID PANEL    3. Acquired hypothyroidism  -     TSH 3RD GENERATION    4. Essential hypertension  -     CBC W/O DIFF  -     METABOLIC PANEL, COMPREHENSIVE    5. S/P gastric bypass    6. Osteoporosis, unspecified osteoporosis type, unspecified pathological fracture presence      Continue current meds   I will call with lab results and make further recommendations or adjustments if necessary. Discussed lifestyle modifications including Na restriction, low carb/fat diet, weight reduction and exercise (at least a walking program). Last DEXA scan was in 2016 which was unremarkable. She got an updated DEXA scan today. I do not anticipate repeating DEXA scans in future if her most recent DEXA scan was within normal range. ICD-10-CM ICD-9-CM    1. Type 2 diabetes mellitus without complication, without long-term current use of insulin (HCC) E11.9 250.00 HEMOGLOBIN A1C W/O EAG      MICROALBUMIN, UR, RAND W/ MICROALB/CREAT RATIO      metFORMIN ER (GLUCOPHAGE XR) 500 mg tablet   2. Hyperlipidemia LDL goal <100 E78.5 272.4 LIPID PANEL   3. Acquired hypothyroidism E03.9 244.9 TSH 3RD GENERATION   4. Essential hypertension I10 401.9 CBC W/O DIFF      METABOLIC PANEL, COMPREHENSIVE   5. S/P gastric bypass Z98.84 V45.86    6. Osteoporosis, unspecified osteoporosis type, unspecified pathological fracture presence M81.0 733.00          Follow-up and Dispositions    · Return in about 3 months (around 9/9/2020) for follow up. I have reviewed with the patient details of the assessment and plan and all questions were answered. Relevent patient education was performed. Verbal and/or written instructions (see AVS) provided. The most recent lab findings were reviewed with the patient.   Plan was discussed with patient who verbally expressed understanding. An After Visit Summary was printed and given to the patient.     Esthela Islas MD

## 2020-06-09 NOTE — PROGRESS NOTES
Moon Glass is a 71 y.o. female presenting for Hypertension (3 mo fu) and Diabetes  . 1. Have you been to the ER, urgent care clinic since your last visit? Hospitalized since your last visit? No    2. Have you seen or consulted any other health care providers outside of the [address] [place] [address] since your last visit? Include any pap smears or colon screening. No    Fall Risk Assessment, last 12 mths 6/9/2020   Able to walk? Yes   Fall in past 12 months? No         Abuse Screening Questionnaire 6/9/2020   Do you ever feel afraid of your partner? N   Are you in a relationship with someone who physically or mentally threatens you? N   Is it safe for you to go home? Y       3 most recent PHQ Screens 6/9/2020   Little interest or pleasure in doing things Not at all   Feeling down, depressed, irritable, or hopeless Not at all   Total Score PHQ 2 0       There are no discontinued medications.

## 2020-06-09 NOTE — PATIENT INSTRUCTIONS
High Blood Pressure: Care Instructions Overview It's normal for blood pressure to go up and down throughout the day. But if it stays up, you have high blood pressure. Another name for high blood pressure is hypertension. Despite what a lot of people think, high blood pressure usually doesn't cause headaches or make you feel dizzy or lightheaded. It usually has no symptoms. But it does increase your risk of stroke, heart attack, and other problems. You and your doctor will talk about your risks of these problems based on your blood pressure. Your doctor will give you a goal for your blood pressure. Your goal will be based on your health and your age. Lifestyle changes, such as eating healthy and being active, are always important to help lower blood pressure. You might also take medicine to reach your blood pressure goal. 
Follow-up care is a key part of your treatment and safety. Be sure to make and go to all appointments, and call your doctor if you are having problems. It's also a good idea to know your test results and keep a list of the medicines you take. How can you care for yourself at home? Medical treatment · If you stop taking your medicine, your blood pressure will go back up. You may take one or more types of medicine to lower your blood pressure. Be safe with medicines. Take your medicine exactly as prescribed. Call your doctor if you think you are having a problem with your medicine. · Talk to your doctor before you start taking aspirin every day. Aspirin can help certain people lower their risk of a heart attack or stroke. But taking aspirin isn't right for everyone, because it can cause serious bleeding. · See your doctor regularly. You may need to see the doctor more often at first or until your blood pressure comes down. · If you are taking blood pressure medicine, talk to your doctor before you take decongestants or anti-inflammatory medicine, such as ibuprofen. Some of these medicines can raise blood pressure. · Learn how to check your blood pressure at home. Lifestyle changes · Stay at a healthy weight. This is especially important if you put on weight around the waist. Losing even 10 pounds can help you lower your blood pressure. · If your doctor recommends it, get more exercise. Walking is a good choice. Bit by bit, increase the amount you walk every day. Try for at least 30 minutes on most days of the week. You also may want to swim, bike, or do other activities. · Avoid or limit alcohol. Talk to your doctor about whether you can drink any alcohol. · Try to limit how much sodium you eat to less than 2,300 milligrams (mg) a day. Your doctor may ask you to try to eat less than 1,500 mg a day. · Eat plenty of fruits (such as bananas and oranges), vegetables, legumes, whole grains, and low-fat dairy products. · Lower the amount of saturated fat in your diet. Saturated fat is found in animal products such as milk, cheese, and meat. Limiting these foods may help you lose weight and also lower your risk for heart disease. · Do not smoke. Smoking increases your risk for heart attack and stroke. If you need help quitting, talk to your doctor about stop-smoking programs and medicines. These can increase your chances of quitting for good. When should you call for help? Call  911 anytime you think you may need emergency care. This may mean having symptoms that suggest that your blood pressure is causing a serious heart or blood vessel problem. Your blood pressure may be over 180/120. For example, call 911 if: 
· You have symptoms of a heart attack. These may include: 
? Chest pain or pressure, or a strange feeling in the chest. 
? Sweating. ? Shortness of breath. ? Nausea or vomiting. ? Pain, pressure, or a strange feeling in the back, neck, jaw, or upper belly or in one or both shoulders or arms. ? Lightheadedness or sudden weakness. ? A fast or irregular heartbeat. · You have symptoms of a stroke. These may include: 
? Sudden numbness, tingling, weakness, or loss of movement in your face, arm, or leg, especially on only one side of your body. ? Sudden vision changes. ? Sudden trouble speaking. ? Sudden confusion or trouble understanding simple statements. ? Sudden problems with walking or balance. ? A sudden, severe headache that is different from past headaches. · You have severe back or belly pain. Do not wait until your blood pressure comes down on its own. Get help right away. Call your doctor now or seek immediate care if: 
· Your blood pressure is much higher than normal (such as 180/120 or higher), but you don't have symptoms. · You think high blood pressure is causing symptoms, such as: 
? Severe headache. 
? Blurry vision. Watch closely for changes in your health, and be sure to contact your doctor if: 
· Your blood pressure measures higher than your doctor recommends at least 2 times. That means the top number is higher or the bottom number is higher, or both. · You think you may be having side effects from your blood pressure medicine. Where can you learn more? Go to http://man-zofia.info/ Enter I891 in the search box to learn more about \"High Blood Pressure: Care Instructions. \" Current as of: December 16, 2019               Content Version: 12.5 © 0818-3804 [employer]. Care instructions adapted under license by [employer] (which disclaims liability or warranty for this information). If you have questions about a medical condition or this instruction, always ask your healthcare professional. [place] [address] any warranty or liability for your use of this information.

## 2020-06-10 ENCOUNTER — APPOINTMENT (OUTPATIENT)
Dept: INTERNAL MEDICINE CLINIC | Age: 70
End: 2020-06-10

## 2020-06-10 DIAGNOSIS — E78.1 HYPERTRIGLYCERIDEMIA: Primary | ICD-10-CM

## 2020-06-10 LAB
A-G RATIO,AGRAT: 1.5 RATIO
ALBUMIN SERPL-MCNC: 4.5 G/DL (ref 3.9–5.4)
ALBUMIN/CREAT UR: 53 MG/G CREAT (ref 0–29)
ALP SERPL-CCNC: 114 U/L (ref 38–126)
ALT SERPL-CCNC: 13 U/L (ref 0–35)
ANION GAP SERPL CALC-SCNC: 10 MMOL/L
AST SERPL W P-5'-P-CCNC: 21 U/L (ref 14–36)
BILIRUB SERPL-MCNC: 0.6 MG/DL (ref 0.2–1.3)
BUN SERPL-MCNC: 20 MG/DL (ref 7–17)
BUN/CREATININE RATIO,BUCR: 18 RATIO
CALCIUM SERPL-MCNC: 10.1 MG/DL (ref 8.4–10.2)
CHLORIDE SERPL-SCNC: 104 MMOL/L (ref 98–107)
CHOL/HDL RATIO,CHHD: 4 RATIO (ref 0–4)
CHOLEST SERPL-MCNC: 149 MG/DL (ref 0–200)
CO2 SERPL-SCNC: 28 MMOL/L (ref 22–32)
CREAT SERPL-MCNC: 1.1 MG/DL (ref 0.7–1.2)
CREAT UR-MCNC: 166.7 MG/DL
ERYTHROCYTE [DISTWIDTH] IN BLOOD BY AUTOMATED COUNT: 14.7 %
GLOBULIN,GLOB: 3.1
GLUCOSE SERPL-MCNC: 167 MG/DL (ref 65–105)
HBA1C MFR BLD HPLC: 7.9 % (ref 4–5.7)
HCT VFR BLD AUTO: 35.7 % (ref 37–51)
HDLC SERPL-MCNC: 35 MG/DL (ref 35–130)
HGB BLD-MCNC: 12.1 G/DL (ref 12–18)
LDL/HDL RATIO,LDHD: 1 RATIO
LDLC SERPL CALC-MCNC: 46 MG/DL (ref 0–130)
MCH RBC QN AUTO: 30.7 PG (ref 26–32)
MCHC RBC AUTO-ENTMCNC: 33.9 G/DL (ref 30–36)
MCV RBC AUTO: 90.7 FL (ref 80–97)
MICROALBUMIN UR-MCNC: 88.1 UG/ML
PLATELET # BLD AUTO: 164 K/UL (ref 140–440)
POTASSIUM SERPL-SCNC: 4.5 MMOL/L (ref 3.6–5)
PROT SERPL-MCNC: 7.6 G/DL (ref 6.3–8.2)
RBC # BLD AUTO: 3.94 M/UL (ref 4.2–6.3)
SODIUM SERPL-SCNC: 142 MMOL/L (ref 137–145)
TRIGL SERPL-MCNC: 341 MG/DL (ref 0–200)
TSH SERPL DL<=0.05 MIU/L-ACNC: 0.83 UIU/ML (ref 0.34–5.6)
VLDLC SERPL CALC-MCNC: 68 MG/DL
WBC # BLD AUTO: 6.6 K/UL (ref 4.1–10.9)

## 2020-06-10 RX ORDER — FENOFIBRATE 145 MG/1
145 TABLET, COATED ORAL DAILY
Qty: 90 TAB | Refills: 0 | Status: SHIPPED | OUTPATIENT
Start: 2020-06-10 | End: 2020-08-30

## 2020-06-10 NOTE — PROGRESS NOTES
Call patient let her know that her A1c has creeped up to 7.9. I have instructed her to take metformin 500 mg twice daily instead of daily dose. I will recheck A1c in 3 months. There is also some microalbuminuria. She is already on losartan. I need a better diabetic control for her. Recommend lifestyle modifications including Na restriction, low carb/fat diet, weight reduction and exercise (at least a walking program). Triglycerides have not improved despite diet modification. I sent prescription for TriCor to her preferred pharmacy. What kind of swelling does she have from the [address]?

## 2020-06-11 ENCOUNTER — TELEPHONE (OUTPATIENT)
Dept: INTERNAL MEDICINE CLINIC | Age: 70
End: 2020-06-11

## 2020-06-14 DIAGNOSIS — G62.9 NEUROPATHY: ICD-10-CM

## 2020-06-15 RX ORDER — GABAPENTIN 300 MG/1
300 CAPSULE ORAL DAILY
Qty: 90 CAP | Refills: 2 | Status: SHIPPED | OUTPATIENT
Start: 2020-06-15 | End: 2021-01-11 | Stop reason: SDUPTHER

## 2020-06-15 NOTE — TELEPHONE ENCOUNTER
Last Refill: 1/15/20  Last visit:6/10/2020    NOV: 9/9/2020    Requested Prescriptions     Pending Prescriptions Disp Refills    gabapentin (NEURONTIN) 300 mg capsule 90 Cap 2     Sig: Take 1 Cap by mouth daily. Max Daily Amount: 300 mg.

## 2020-06-18 ENCOUNTER — OFFICE VISIT (OUTPATIENT)
Dept: URGENT CARE | Age: 70
End: 2020-06-18

## 2020-06-18 VITALS — HEART RATE: 85 BPM | OXYGEN SATURATION: 94 % | TEMPERATURE: 98.9 F | RESPIRATION RATE: 18 BRPM

## 2020-06-18 DIAGNOSIS — R50.9 FEVER, UNSPECIFIED FEVER CAUSE: Primary | ICD-10-CM

## 2020-06-18 DIAGNOSIS — Z11.59 SCREENING FOR VIRAL DISEASE: ICD-10-CM

## 2020-06-18 DIAGNOSIS — R52 GENERALIZED BODY ACHES: ICD-10-CM

## 2020-06-18 NOTE — PROGRESS NOTES
This patient was seen in [facility] at [address] [place] [address] Urgent Care while in their vehicle due to COVID-19 pandemic with PPE and focused examination in order to decrease community viral transmission. The patient/guardian gave verbal consent to treat. Flex Pop is a 71 y.o. female who presents with fever and generalized body aches since yesterday. Tmax of 100.5. No known exposure to COVID-19. Eating and drinking well. Does not work but has been doing shopping with mask. Denies cough, SOB. PMH: DM2, hypothyroidism, HTN. Smoker/Non-smoker. The history is provided by the patient.         Past Medical History:   Diagnosis Date    Abnormal kidney function 10/18/2017    Abnormal presence of protein in urine 10/18/2017    Acute serous otitis media of left ear 10/18/2017    Comments: recurrence not specified    Allergic rhinitis     Anemia     Anemia in chronic kidney disease 10/18/2017    Annual physical exam 10/18/2017    Back pain     Basal cell carcinoma     of tip of nose    Boil of trunk 10/18/2017    Bronchitis 10/18/2017    Carcinoma ([address] 10/18/2017    Comments: basal cell nose s/p excision    Chest pain, exertional 10/18/2017    Cough 10/18/2017    Dry mouth     Eczema 10/18/2017    Comments: intrinsic     History of cholecystectomy 10/18/2017    Comments: open 1989    HTN (hypertension) 10/18/2017    Kidney stone     LUIS FERNANDO (obstructive sleep apnea)     Osteopenia     Osteoporosis 10/18/2017    Other and unspecified hyperlipidemia     Pigmentary retinopathy     Type II or unspecified type diabetes mellitus without mention of complication, uncontrolled     Unspecified essential hypertension     Unspecified hypothyroidism     Unspecified vitamin D deficiency         Past Surgical History:   Procedure Laterality Date    COLONOSCOPY N/A 4/10/2018    COLON[facility] performed by Sherice Nj [facility] at [facility]       x2    HX CHOLECYSTECTOMY      HX OTHER SURGICAL  3/10/16    gastric sleeve    HX POLYPECTOMY      HX TONSILLECTOMY           Family History   Problem Relation Age of Onset    Diabetes Father     Stroke Father     Cancer Mother         lung    Other Mother         aortic aneurysm        Social History     Socioeconomic History    Marital status:      Spouse name: Not on file    Number of children: Not on file    Years of education: Not on file    Highest education level: Not on file   Occupational History    Not on file   Social Needs    Financial resource strain: Not on file    Food insecurity     Worry: Not on file     Inability: Not on file    Transportation needs     Medical: Not on file     Non-medical: Not on file   Tobacco Use    Smoking status: Never Smoker    Smokeless tobacco: Never Used   Substance and Sexual Activity    Alcohol use: Yes     Comment: maybe a few times a year at most    Drug use: No    Sexual activity: Not on file   Lifestyle    Physical activity     Days per week: Not on file     Minutes per session: Not on file    Stress: Not on file   Relationships    Social connections     Talks on phone: Not on file     Gets together: Not on file     Attends [employer] service: Not on file     Active member of club or organization: Not on file     Attends meetings of clubs or organizations: Not on file     Relationship status: Not on file    Intimate partner violence     Fear of current or ex partner: Not on file     Emotionally abused: Not on file     Physically abused: Not on file     Forced sexual activity: Not on file   Other Topics Concern    Not on file   Social History Narrative    Lives in [place] with  of 44 years. Has one daughter and one son. Retired   at [employer]. Likes to read and play on the computer.                 ALLERGIES: Niacin; Sulfa (sulfonamide antibiotics); and Tricor [fenofibrate micronized]    Review of Systems Constitutional: Positive for fever. Negative for activity change, appetite change and chills. HENT: Negative for congestion, rhinorrhea and sore throat. Respiratory: Negative for cough, shortness of breath and wheezing. Cardiovascular: Negative for chest pain. Gastrointestinal: Negative for abdominal pain, diarrhea, nausea and vomiting. Musculoskeletal: Positive for myalgias. Neurological: Negative for headaches. Vitals:    06/18/20 1052 06/18/20 1102   Pulse: 85    Resp: 18    Temp: 98.9 °F (37.2 °C)    SpO2: 93% 94%       Physical Exam  Vitals signs and nursing note reviewed. Constitutional:       General: She is not in acute distress. Appearance: She is well-developed. She is not diaphoretic. Pulmonary:      Effort: Pulmonary effort is normal. No respiratory distress. Breath sounds: Normal breath sounds. No stridor. No wheezing, rhonchi or rales. Neurological:      Mental Status: She is alert. Psychiatric:         Behavior: Behavior normal.         Thought Content: Thought content normal.         Judgment: Judgment normal.         MDM    ICD-10-CM ICD-9-CM   1. Fever, unspecified fever cause R50.9 780.60   2. Generalized body aches R52 780.96   3. Screening for viral disease Z11.59 V73.99       Orders Placed This Encounter    NOVEL CORONAVIRUS (COVID-19)     Scheduling Instructions:      1) Due to current limited availability of the COVID-19 PCR test, tests will be prioritized and may not be completed.              2) Order only if the test result will change clinical management or necessary for a return to mission-critical employment decision.              3) Print and instruct patient to adhere to CDC home isolation program. (Link Above)              4) Set up or refer patient for a monitoring program.              5) Have patient sign up for and leverage [employer] (if not previously done).      Order Specific Question:   Status     Answer:   Symptomatic/Infection Suspected Self Quarantine  Deep breathing exercises  Tylenol prn  Increase fluids    If signs and symptoms become worse the pt is to go to the ER.          Procedures

## 2020-06-21 LAB — SARS-COV-2, NAA: NOT DETECTED

## 2020-06-29 RX ORDER — ROSUVASTATIN CALCIUM 20 MG/1
TABLET, COATED ORAL
Qty: 90 TAB | Refills: 0 | Status: SHIPPED | OUTPATIENT
Start: 2020-06-29 | End: 2020-09-28

## 2020-06-29 NOTE — TELEPHONE ENCOUNTER
RX refill request from the patient/pharmacy. Patient last seen 06- with labs, and next appt. scheduled for 09-  Requested Prescriptions     Pending Prescriptions Disp Refills    rosuvastatin (CRESTOR) 20 mg tablet [Pharmacy Med Name: ROSUVASTATIN TABS 20MG] 90 Tab 0     Sig: TAKE 1 TABLET DAILY   .

## 2020-08-03 RX ORDER — LOSARTAN POTASSIUM 100 MG/1
100 TABLET ORAL DAILY
Qty: 90 TAB | Refills: 3 | Status: SHIPPED | OUTPATIENT
Start: 2020-08-03 | End: 2021-07-19

## 2020-08-03 NOTE — TELEPHONE ENCOUNTER
PCP: Georgina Currie MD    Last appt: 6/10/2020  Future Appointments   Date Time Provider Royer Bowers   9/9/2020 10:30 AM Georgina Currie MD [facility] AMB       Requested Prescriptions     Pending Prescriptions Disp Refills    losartan (COZAAR) 100 mg tablet 90 Tab 3     Sig: Take 1 Tab by mouth daily.        Prior labs and Blood pressures:  BP Readings from Last 3 Encounters:   06/09/20 150/88   12/30/19 132/70   10/15/19 154/64     Lab Results   Component Value Date/Time    Sodium 142 06/10/2020 08:42 AM    Potassium 4.5 06/10/2020 08:42 AM    Chloride 104 06/10/2020 08:42 AM    CO2 28.0 06/10/2020 08:42 AM    Anion gap 10 06/10/2020 08:42 AM    Glucose 167 (H) 06/10/2020 08:42 AM    BUN 20.0 (H) 06/10/2020 08:42 AM    Creatinine 1.1 06/10/2020 08:42 AM    BUN/Creatinine ratio 18 06/10/2020 08:42 AM    GFR est AA 60 (H) 06/10/2020 08:42 AM    GFR est non-AA 49 06/10/2020 08:42 AM    Calcium 10.1 06/10/2020 08:42 AM     Lab Results   Component Value Date/Time    Hemoglobin A1c 7.9 (H) 06/10/2020 08:43 AM    Hemoglobin A1c (POC) 5.7 10/12/2018 08:26 AM    Hemoglobin A1c, External 7.9 11/05/2015     Lab Results   Component Value Date/Time    Cholesterol, total 149 06/10/2020 08:42 AM    Cholesterol (POC) 146.0 10/12/2018 08:26 AM    HDL Cholesterol 35 06/10/2020 08:42 AM    HDL Cholesterol (POC) 41.0 10/12/2018 08:26 AM    LDL Cholesterol (POC) 54.2 10/12/2018 08:26 AM    LDL, calculated 46 06/10/2020 08:42 AM    VLDL, calculated 62 (H) 11/17/2014 05:17 PM    VLDL 68 06/10/2020 08:42 AM    Triglyceride 341 (H) 06/10/2020 08:42 AM    Triglycerides (POC) 254.0 (A) 10/12/2018 08:26 AM    CHOL/HDL Ratio 4 06/10/2020 08:42 AM     Lab Results   Component Value Date/Time    VITAMIN D, 25-HYDROXY 43 12/30/2019 09:45 AM       Lab Results   Component Value Date/Time    TSH 0.551 11/17/2014 05:17 PM    TSH, 3rd generation 0.83 06/10/2020 08:42 AM

## 2020-08-29 DIAGNOSIS — E78.1 HYPERTRIGLYCERIDEMIA: ICD-10-CM

## 2020-08-30 RX ORDER — FENOFIBRATE 145 MG/1
TABLET, COATED ORAL
Qty: 90 TAB | Refills: 3 | Status: SHIPPED | OUTPATIENT
Start: 2020-08-30 | End: 2021-01-13 | Stop reason: ALTCHOICE

## 2020-09-08 PROBLEM — E78.1 HYPERTRIGLYCERIDEMIA: Status: ACTIVE | Noted: 2020-09-08

## 2020-09-08 NOTE — PROGRESS NOTES
This note will not be viewable in [address] Alin Ramos is a 79 y.o. female and presents with Hypertension (3 mo fu) and Diabetes      Subjective: For follow-up of her chronic medical problems which include diabetes, hypertension hyperlipidemia    Type 2 diabetes, uncontrolled. A1c increased to 7.1. Had instructed her to increase metformin to 2 times daily however she continues to take once her dose because she wanted to use up the tablets which she already had. She does admit not to be very compliant with her diet and exercise. Dyslipidemia and specific hypertriglyceridemia. She remains on a statin and was initiated on TriCor. Denies any muscle cramps, myalgias. Tolerating medication well. Blood pressure well controlled on current meds. Recap--last A1c had slowly creeped up to 6.8. I started her on metformin 500 mg p.o. daily and had instructed her to increase it to twice daily dosing. She reports she is only taking it in the morning. She has not been checking her blood sugar regularly. She denies polyuria, polydipsia. Denies hypoglycemic events. She is tolerating the medication well. She is on gabapentin for intermittent numbness and tingling sensation bilateral feet. Hypertriglyceridemia and hyperlipidemia. Recommended to start omega-3 fatty acids over-the-counter in addition to her Crestor. Hypothyroidism, on replacement. Levothyroxine 88 mcg. Last TSH was within normal limits. Blood pressure well controlled on current meds.         Past Medical History:   Diagnosis Date    Abnormal kidney function 10/18/2017    Abnormal presence of protein in urine 10/18/2017    Acute serous otitis media of left ear 10/18/2017    Comments: recurrence not specified    Allergic rhinitis     Anemia     Anemia in chronic kidney disease 10/18/2017    Annual physical exam 10/18/2017    Back pain     Basal cell carcinoma     of tip of nose    Boil of trunk 10/18/2017    Bronchitis 10/18/2017    Carcinoma ([address] 10/18/2017    Comments: basal cell nose s/p excision    Chest pain, exertional 10/18/2017    Cough 10/18/2017    Dry mouth     Eczema 10/18/2017    Comments: intrinsic     History of cholecystectomy 10/18/2017    Comments: open 1989    HTN (hypertension) 10/18/2017    Kidney stone     LUIS FERNANDO (obstructive sleep apnea)     Osteopenia     Osteoporosis 10/18/2017    Other and unspecified hyperlipidemia     Pigmentary retinopathy     Type II or unspecified type diabetes mellitus without mention of complication, uncontrolled     Unspecified essential hypertension     Unspecified hypothyroidism     Unspecified vitamin D deficiency      Past Surgical History:   Procedure Laterality Date    COLONOSCOPY N/A 4/10/2018    [facility] ENDOSCOPY    DELIVERY       x2    HX CHOLECYSTECTOMY      HX OTHER SURGICAL  3/10/16    gastric sleeve    HX POLYPECTOMY      HX TONSILLECTOMY       Allergies   Allergen Reactions    Niacin Other (comments)     Skin irritation    Sulfa (Sulfonamide Antibiotics) Unknown (comments)    Tricor [Fenofibrate Micronized] Swelling     Current Outpatient Medications   Medication Sig Dispense Refill    metFORMIN ER (GLUCOPHAGE XR) 500 mg tablet Take 1 Tab by mouth two (2) times daily (with meals) for 90 days. 180 Tab 0    Blood-Glucose Meter (Relion Confirm) monitoring kit Please use to check BG  fasting blood sugar 1 time in a day 1 Kit 0    glucose blood VI test strips (ReliOn Prime Test Strips) strip Check fasting blood sugar daily (1 time in a day) 100 Strip 0    fenofibrate nanocrystallized (TRICOR) 145 mg tablet TAKE 1 TABLET DAILY FOR HIGH AMOUNT OF TRIGLYCERIDE IN THE BLOOD. 90 Tab 3    losartan (COZAAR) 100 mg tablet Take 1 Tab by mouth daily.  90 Tab 3    rosuvastatin (CRESTOR) 20 mg tablet TAKE 1 TABLET DAILY 90 Tab 0    gabapentin (NEURONTIN) 300 mg capsule Take 1 Cap by mouth daily. Max Daily Amount: 300 mg. 90 Cap 2    levothyroxine (SYNTHROID) 88 mcg tablet TK 1 T PO D B ELIN      calcium citrate-vitamin D3 (CITRACAL + D) tablet Take  by mouth two (2) times a day.  B.infantis-B.ani-B.long-B.bifi (PROBIOTIC 4X) 10-15 mg TbEC Take  by mouth.  silver sulfADIAZINE (SILVADENE) 1 % topical cream Apply  to affected area as needed.  MULTIVITAMIN/IRON/FOLIC ACID (CENTRUM COMPLETE PO) Take  by mouth daily.  fluticasone (FLONASE) 50 mcg/actuation nasal spray nightly.  CETIRIZINE HCL (ZYRTEC PO) Take 10 mg by mouth daily. Social History     Socioeconomic History    Marital status:      Spouse name: Not on file    Number of children: Not on file    Years of education: Not on file    Highest education level: Not on file   Tobacco Use    Smoking status: Never Smoker    Smokeless tobacco: Never Used   Substance and Sexual Activity    Alcohol use: Yes     Comment: maybe a few times a year at most    Drug use: No   Social History Narrative    Lives in [address] [place] [address] with  of 44 years. Has one daughter and one son. Retired   at [employer]. Likes to read and play on the computer. Family History   Problem Relation Age of Onset    Diabetes Father     Stroke Father     Cancer Mother         lung    Other Mother         aortic aneurysm       Review of Systems  ROS is unremarkable except for ones highlighted in bold.    Constitutional: negative for fevers, chills, anorexia and weight loss  Eyes:   negative for visual disturbance and irritation  ENT:   negative for tinnitus,sore throat,nasal congestion,ear pain,hoarseness  Respiratory:  negative for cough, hemoptysis, dyspnea,wheezing  CV:   negative for chest pain, palpitations, lower extremity edema  GI:   negative for nausea, vomiting, diarrhea, abdominal pain,melena  Endo:               negative for polyuria,polydipsia,polyphagia,heat intolerance  Genitourinary: negative for frequency, dysuria and hematuria  Integumentary: negative for rash and pruritus  Hematologic:  negative for easy bruising and gum/nose bleeding  Musculoskel: negative for myalgias, arthralgias, back pain, muscle weakness, joint pain  Neurological:  negative for headaches, dizziness, vertigo, memory problems and gait   Behavl/Psych: negative for feelings of anxiety, depression, mood changes  ROS otherwise negative     Objective:  Visit Vitals  /70 (BP 1 Location: Left arm, BP Patient Position: Sitting)   Pulse 75   Temp 97.9 °F (36.6 °C)   Resp 16   Ht 5' 2\" (1.575 m)   Wt 185 lb (83.9 kg)   LMP  (LMP Unknown)   SpO2 97%   BMI 33.84 kg/m²     Physical Exam:   General appearance - alert, well appearing, and in no distress  Mental status - alert, oriented to person, place, and time  EYE-FADIA, EOMI, fundi normal, corneas normal, no foreign bodies  ENT-ENT exam normal, no neck nodes or sinus tenderness  Nose - normal and patent, no erythema, discharge or polyps  Mouth - mucous membranes moist, pharynx normal without lesions  Neck - supple, no significant adenopathy   Chest - clear to auscultation, no wheezes, rales or rhonchi, symmetric air entry   Heart - normal rate, regular rhythm, normal S1, S2, no murmurs, rubs, clicks or gallops   Abdomen - soft, nontender, nondistended, no masses or organomegaly, healed scar from gastric sleeve  Lymph- no adenopathy palpable  Ext-peripheral pulses normal, no pedal edema, no clubbing or cyanosis  Skin-Warm and dry.  no hyperpigmentation, vitiligo, or suspicious lesions  Neuro -alert, oriented, normal speech, no focal findings or movement disorder noted  Musculoskeletal- FROM, no bony abnormalities, no point tenderness        Lab Review:  Results for orders placed or performed in visit on 06/18/20   NOVEL CORONAVIRUS (COVID-19)   Result Value Ref Range    SARS-CoV-2, SOLO Not Detected Not Detected        Documenation Review:    Assessment/Plan:    Diagnoses and all orders for this visit:    1. Type 2 diabetes mellitus without complication, without long-term current use of insulin (HCC)  -     metFORMIN ER (GLUCOPHAGE XR) 500 mg tablet; Take 1 Tab by mouth two (2) times daily (with meals) for 90 days.  -     Blood-Glucose Meter (Relion Confirm) monitoring kit; Please use to check BG  fasting blood sugar 1 time in a day  -     glucose blood VI test strips (ReliOn Prime Test Strips) strip; Check fasting blood sugar daily (1 time in a day)    2. Type 2 diabetes with nephropathy (HCC)  -     Blood-Glucose Meter (Relion Confirm) monitoring kit; Please use to check BG  fasting blood sugar 1 time in a day  -     glucose blood VI test strips (ReliOn Prime Test Strips) strip; Check fasting blood sugar daily (1 time in a day)    3. Hypertriglyceridemia    4. Essential hypertension  -     CBC W/O DIFF  -     METABOLIC PANEL, COMPREHENSIVE      Continue current meds   I will call with lab results and make further recommendations or adjustments if necessary. Discussed lifestyle modifications including Na restriction, low carb/fat diet, weight reduction and exercise (at least a walking program). Recommend to increase metformin to 2 times in a day. Continue statin and TriCor. ICD-10-CM ICD-9-CM    1. Type 2 diabetes mellitus without complication, without long-term current use of insulin (HCC)  E11.9 250.00 metFORMIN ER (GLUCOPHAGE XR) 500 mg tablet      Blood-Glucose Meter (Relion Confirm) monitoring kit      glucose blood VI test strips (ReliOn Prime Test Strips) strip   2. Type 2 diabetes with nephropathy (HCC)  E11.21 250.40 Blood-Glucose Meter (Relion Confirm) monitoring kit     583.81 glucose blood VI test strips (ReliOn Prime Test Strips) strip   3. Hypertriglyceridemia  E78.1 272.1    4.  Essential hypertension  I10 401.9 CBC W/O DIFF      METABOLIC PANEL, COMPREHENSIVE         Follow-up and Dispositions    · Return in about 3 months (around 12/9/2020) for CPE-30mins. I have reviewed with the patient details of the assessment and plan and all questions were answered. Relevent patient education was performed. Verbal and/or written instructions (see AVS) provided. The most recent lab findings were reviewed with the patient. Plan was discussed with patient who verbally expressed understanding. An After Visit Summary was printed and given to the patient.     Delilah Ramirez MD

## 2020-09-09 ENCOUNTER — OFFICE VISIT (OUTPATIENT)
Dept: INTERNAL MEDICINE CLINIC | Age: 70
End: 2020-09-09
Payer: MEDICARE

## 2020-09-09 VITALS
OXYGEN SATURATION: 97 % | DIASTOLIC BLOOD PRESSURE: 70 MMHG | HEART RATE: 75 BPM | WEIGHT: 185 LBS | BODY MASS INDEX: 34.04 KG/M2 | SYSTOLIC BLOOD PRESSURE: 134 MMHG | TEMPERATURE: 97.9 F | HEIGHT: 62 IN | RESPIRATION RATE: 16 BRPM

## 2020-09-09 DIAGNOSIS — I10 ESSENTIAL HYPERTENSION: ICD-10-CM

## 2020-09-09 DIAGNOSIS — E11.9 TYPE 2 DIABETES MELLITUS WITHOUT COMPLICATION, WITHOUT LONG-TERM CURRENT USE OF INSULIN (HCC): Primary | ICD-10-CM

## 2020-09-09 DIAGNOSIS — E78.1 HYPERTRIGLYCERIDEMIA: ICD-10-CM

## 2020-09-09 DIAGNOSIS — E11.21 TYPE 2 DIABETES WITH NEPHROPATHY (HCC): ICD-10-CM

## 2020-09-09 LAB
A-G RATIO,AGRAT: 1.8 RATIO
ALBUMIN SERPL-MCNC: 4.8 G/DL (ref 3.9–5.4)
ALP SERPL-CCNC: 76 U/L (ref 38–126)
ALT SERPL-CCNC: 40 U/L (ref 0–35)
ANION GAP SERPL CALC-SCNC: 13 MMOL/L
AST SERPL W P-5'-P-CCNC: 54 U/L (ref 14–36)
BILIRUB SERPL-MCNC: 0.5 MG/DL (ref 0.2–1.3)
BUN SERPL-MCNC: 25 MG/DL (ref 7–17)
BUN/CREATININE RATIO,BUCR: 19 RATIO
CALCIUM SERPL-MCNC: 10 MG/DL (ref 8.4–10.2)
CHLORIDE SERPL-SCNC: 104 MMOL/L (ref 98–107)
CO2 SERPL-SCNC: 27 MMOL/L (ref 22–32)
CREAT SERPL-MCNC: 1.3 MG/DL (ref 0.7–1.2)
ERYTHROCYTE [DISTWIDTH] IN BLOOD BY AUTOMATED COUNT: 14.4 %
GLOBULIN,GLOB: 2.7
GLUCOSE SERPL-MCNC: 167 MG/DL (ref 65–105)
HCT VFR BLD AUTO: 35.6 % (ref 37–51)
HGB BLD-MCNC: 11.6 G/DL (ref 12–18)
MCH RBC QN AUTO: 29.7 PG (ref 26–32)
MCHC RBC AUTO-ENTMCNC: 32.6 G/DL (ref 30–36)
MCV RBC AUTO: 91.4 FL (ref 80–97)
PLATELET # BLD AUTO: 225 K/UL (ref 140–440)
POTASSIUM SERPL-SCNC: 4.9 MMOL/L (ref 3.6–5)
PROT SERPL-MCNC: 7.5 G/DL (ref 6.3–8.2)
RBC # BLD AUTO: 3.9 M/UL (ref 4.2–6.3)
SODIUM SERPL-SCNC: 144 MMOL/L (ref 137–145)
WBC # BLD AUTO: 6.4 K/UL (ref 4.1–10.9)

## 2020-09-09 PROCEDURE — G8754 DIAS BP LESS 90: HCPCS | Performed by: INTERNAL MEDICINE

## 2020-09-09 PROCEDURE — G8417 CALC BMI ABV UP PARAM F/U: HCPCS | Performed by: INTERNAL MEDICINE

## 2020-09-09 PROCEDURE — 1101F PT FALLS ASSESS-DOCD LE1/YR: CPT | Performed by: INTERNAL MEDICINE

## 2020-09-09 PROCEDURE — 3051F HG A1C>EQUAL 7.0%<8.0%: CPT | Performed by: INTERNAL MEDICINE

## 2020-09-09 PROCEDURE — 85027 COMPLETE CBC AUTOMATED: CPT | Performed by: INTERNAL MEDICINE

## 2020-09-09 PROCEDURE — G8427 DOCREV CUR MEDS BY ELIG CLIN: HCPCS | Performed by: INTERNAL MEDICINE

## 2020-09-09 PROCEDURE — G8432 DEP SCR NOT DOC, RNG: HCPCS | Performed by: INTERNAL MEDICINE

## 2020-09-09 PROCEDURE — 99214 OFFICE O/P EST MOD 30 MIN: CPT | Performed by: INTERNAL MEDICINE

## 2020-09-09 PROCEDURE — 2022F DILAT RTA XM EVC RTNOPTHY: CPT | Performed by: INTERNAL MEDICINE

## 2020-09-09 PROCEDURE — G9899 SCRN MAM PERF RSLTS DOC: HCPCS | Performed by: INTERNAL MEDICINE

## 2020-09-09 PROCEDURE — G8536 NO DOC ELDER MAL SCRN: HCPCS | Performed by: INTERNAL MEDICINE

## 2020-09-09 PROCEDURE — 3017F COLORECTAL CA SCREEN DOC REV: CPT | Performed by: INTERNAL MEDICINE

## 2020-09-09 PROCEDURE — 1090F PRES/ABSN URINE INCON ASSESS: CPT | Performed by: INTERNAL MEDICINE

## 2020-09-09 PROCEDURE — 80053 COMPREHEN METABOLIC PANEL: CPT | Performed by: INTERNAL MEDICINE

## 2020-09-09 PROCEDURE — G8752 SYS BP LESS 140: HCPCS | Performed by: INTERNAL MEDICINE

## 2020-09-09 RX ORDER — INSULIN PUMP SYRINGE, 3 ML
EACH MISCELLANEOUS
Qty: 1 KIT | Refills: 0 | Status: SHIPPED | OUTPATIENT
Start: 2020-09-09

## 2020-09-09 RX ORDER — METFORMIN HYDROCHLORIDE 500 MG/1
500 TABLET, EXTENDED RELEASE ORAL 2 TIMES DAILY WITH MEALS
Qty: 180 TAB | Refills: 0 | Status: SHIPPED | OUTPATIENT
Start: 2020-09-09 | End: 2020-11-16

## 2020-09-09 RX ORDER — BLOOD SUGAR DIAGNOSTIC
STRIP MISCELLANEOUS
Qty: 100 STRIP | Refills: 0 | Status: SHIPPED | OUTPATIENT
Start: 2020-09-09 | End: 2020-11-20

## 2020-09-09 NOTE — PROGRESS NOTES
Laura Neff is a 79 y.o. female presenting for Hypertension (3 mo fu) and Diabetes  . 1. Have you been to the ER, urgent care clinic since your last visit? Hospitalized since your last visit? No    2. Have you seen or consulted any other health care providers outside of the [address] [place] [address] since your last visit? Include any pap smears or colon screening. No    Fall Risk Assessment, last 12 mths 6/9/2020   Able to walk? Yes   Fall in past 12 months? No         Abuse Screening Questionnaire 6/9/2020   Do you ever feel afraid of your partner? N   Are you in a relationship with someone who physically or mentally threatens you? N   Is it safe for you to go home? Y       3 most recent PHQ Screens 6/9/2020   Little interest or pleasure in doing things Not at all   Feeling down, depressed, irritable, or hopeless Not at all   Total Score PHQ 2 0       There are no discontinued medications.

## 2020-09-09 NOTE — PATIENT INSTRUCTIONS
Learning About Type 2 Diabetes What is type 2 diabetes? Insulin is a hormone that helps your body use sugar from your food as energy. Type 2 diabetes happens when your body can't use insulin the right way. Over time, the pancreas can't make enough insulin. If you don't have enough insulin, too much sugar stays in your blood. If you are overweight, get little or no exercise, or have type 2 diabetes in your family, you are more likely to have problems with the way insulin works in your body.  Americans, Hispanics, Native Americans,  Americans, and Pacific Islanders have a higher risk for type 2 diabetes. Type 2 diabetes can be prevented or delayed with a healthy lifestyle, which includes staying at a healthy weight, making smart food choices, and getting regular exercise. What can you expect with type 2 diabetes? Preethi Aaron keep hearing about how important it is to keep your blood sugar within a target range. That's because over time, high blood sugar can lead to serious problems. It can: 
· Harm your eyes, nerves, and kidneys. · Damage your blood vessels, leading to heart disease and stroke. · Reduce blood flow and cause nerve damage to parts of your body, especially your feet. This can cause slow healing and pain when you walk. · Make your immune system weak and less able to fight infections. When people hear the word \"diabetes,\" they often think of problems like these. But daily care and treatment can help prevent or delay these problems. The goal is to keep your blood sugar in a target range. That's the best way to reduce your chance of having more problems from diabetes. What are the symptoms? Some people who have type 2 diabetes may not have any symptoms early on. Many people with the disease don't even know they have it at first. But with time, diabetes starts to cause symptoms. You experience most symptoms of type 2 diabetes when your blood sugar is either too high or too low. The most common symptoms of high blood sugar include: · Thirst. 
· Frequent urination. · Weight loss. · Blurry vision. The symptoms of low blood sugar include: · Sweating. · Shakiness. · Weakness. · Hunger. · Confusion. How can you prevent type 2 diabetes? The best way to prevent or delay type 2 diabetes is to adopt healthy habits, which include: 
· Staying at a healthy weight. · Exercising regularly. · Eating healthy foods. How is type 2 diabetes treated? If you have type 2 diabetes, here are the most important things you can do. · Take your diabetes medicines. · Check your blood sugar as often as your doctor recommends. Also, get a hemoglobin A1c test at least every 6 months. · Try to eat a variety of foods and to spread carbohydrate throughout the day. Carbohydrate raises blood sugar higher and more quickly than any other nutrient does. Carbohydrate is found in sugar, breads and cereals, fruit, starchy vegetables such as potatoes and corn, and milk and yogurt. · Get at least 30 minutes of exercise on most days of the week. Walking is a good choice. You also may want to do other activities, such as running, swimming, cycling, or playing tennis or team sports. If your doctor says it's okay, do muscle-strengthening exercises at least 2 times a week. · See your doctor for checkups and tests on a regular schedule. · If you have high blood pressure or high cholesterol, take the medicines as prescribed by your doctor. · Do not smoke. Smoking can make health problems worse. This includes problems you might have with type 2 diabetes. If you need help quitting, talk to your doctor about stop-smoking programs and medicines. These can increase your chances of quitting for good. Follow-up care is a key part of your treatment and safety. Be sure to make and go to all appointments, and call your doctor if you are having problems.  It's also a good idea to know your test results and keep a list of the medicines you take. Where can you learn more? Go to http://www.gray.com/ Enter D872 in the search box to learn more about \"Learning About Type 2 Diabetes. \" Current as of: December 20, 2019               Content Version: 12.6 © 6753-2810 [employer]. Care instructions adapted under license by [employer] (which disclaims liability or warranty for this information). If you have questions about a medical condition or this instruction, always ask your healthcare professional. [address] any warranty or liability for your use of this information.

## 2020-09-28 RX ORDER — ROSUVASTATIN CALCIUM 20 MG/1
TABLET, COATED ORAL
Qty: 90 TAB | Refills: 3 | Status: SHIPPED | OUTPATIENT
Start: 2020-09-28 | End: 2021-09-03 | Stop reason: SDUPTHER

## 2020-09-28 NOTE — TELEPHONE ENCOUNTER
PCP: Danny Rome MD    Last appt: 6/10/2020  Future Appointments   Date Time Provider Royer Bowers   10/30/2020  8:40 AM [facility] 3 [facility]   1/11/2021  9:00 AM Danny Rome MD [facility] AMB       Requested Prescriptions     Pending Prescriptions Disp Refills    rosuvastatin (CRESTOR) 20 mg tablet [Pharmacy Med Name: ROSUVASTATIN TABS 20MG] 90 Tab 3     Sig: TAKE 1 TABLET DAILY

## 2020-10-30 ENCOUNTER — HOSPITAL ENCOUNTER (OUTPATIENT)
Dept: MAMMOGRAPHY | Age: 70
Discharge: HOME | End: 2020-10-30
Attending: OBSTETRICS & GYNECOLOGY
Payer: MEDICARE

## 2020-10-30 DIAGNOSIS — Z12.31 VISIT FOR SCREENING MAMMOGRAM: ICD-10-CM

## 2020-10-30 PROCEDURE — 77067 SCR MAMMO BI INCL CAD: CPT

## 2020-11-16 DIAGNOSIS — E11.9 TYPE 2 DIABETES MELLITUS WITHOUT COMPLICATION, WITHOUT LONG-TERM CURRENT USE OF INSULIN (HCC): ICD-10-CM

## 2020-11-16 RX ORDER — METFORMIN HYDROCHLORIDE 500 MG/1
TABLET, EXTENDED RELEASE ORAL
Qty: 180 TAB | Refills: 3 | Status: SHIPPED | OUTPATIENT
Start: 2020-11-16 | End: 2020-12-06

## 2020-11-16 RX ORDER — LEVOTHYROXINE SODIUM 88 UG/1
TABLET ORAL
Qty: 90 TAB | Refills: 1 | Status: SHIPPED | OUTPATIENT
Start: 2020-11-16 | End: 2021-05-12

## 2020-11-16 NOTE — TELEPHONE ENCOUNTER
Last Refill: 5/26/2020  Last Visit: 9/9/2020   Next Visit: 1/11/2021    Requested Prescriptions     Pending Prescriptions Disp Refills    levothyroxine (SYNTHROID) 88 mcg tablet

## 2020-11-20 DIAGNOSIS — E11.9 TYPE 2 DIABETES MELLITUS WITHOUT COMPLICATION, WITHOUT LONG-TERM CURRENT USE OF INSULIN (HCC): ICD-10-CM

## 2020-11-20 DIAGNOSIS — E11.21 TYPE 2 DIABETES WITH NEPHROPATHY (HCC): ICD-10-CM

## 2020-11-20 RX ORDER — CALCIUM CITRATE/VITAMIN D3 200MG-6.25
TABLET ORAL
Qty: 100 STRIP | Refills: 0 | Status: SHIPPED | OUTPATIENT
Start: 2020-11-20 | End: 2021-03-11

## 2020-12-06 DIAGNOSIS — E11.9 TYPE 2 DIABETES MELLITUS WITHOUT COMPLICATION, WITHOUT LONG-TERM CURRENT USE OF INSULIN (HCC): ICD-10-CM

## 2020-12-06 RX ORDER — METFORMIN HYDROCHLORIDE 500 MG/1
TABLET, EXTENDED RELEASE ORAL
Qty: 180 TAB | Refills: 3 | Status: SHIPPED | OUTPATIENT
Start: 2020-12-06 | End: 2021-01-12 | Stop reason: SDUPTHER

## 2021-01-10 NOTE — PROGRESS NOTES
Adela Garcia is a 70 y.o. female and presents with Annual Wellness Visit      Subjective:  Patient comes in today for annual Medicare wellness visit and follow-up of hypertension, diabetes and hyperlipidemia.    For follow-up of her chronic medical problems which include diabetes, hypertension hyperlipidemia     Type 2 diabetes, uncontrolled.  A1c increased to 7.9.    On Metformin 500 mg p.o. twice daily. She does admit not to be very compliant with her diet and exercise.She is on gabapentin for intermittent numbness and tingling sensation bilateral feet.     Dyslipidemia and specific hypertriglyceridemia.  She remains on a statin and was initiated on TriCor.  Denies any muscle cramps, myalgias. Tolerating medication well.     Blood pressure well controlled on current meds.    Hypothyroidism, on replacement.  Levothyroxine 88 mcg.  Last TSH was within normal limits.    Past Medical History:   Diagnosis Date   • Abnormal kidney function 10/18/2017   • Abnormal presence of protein in urine 10/18/2017   • Acute serous otitis media of left ear 10/18/2017    Comments: recurrence not specified   • Allergic rhinitis    • Anemia    • Anemia in chronic kidney disease 10/18/2017   • Annual physical exam 10/18/2017   • Back pain    • Basal cell carcinoma     of tip of nose   • Boil of trunk 10/18/2017   • Bronchitis 10/18/2017   • Carcinoma (HCC) 10/18/2017    Comments: basal cell nose s/p excision   • Chest pain, exertional 10/18/2017   • Cough 10/18/2017   • Dry mouth    • Eczema 10/18/2017    Comments: intrinsic    • History of cholecystectomy 10/18/2017    Comments: open 1989   • HTN (hypertension) 10/18/2017   • Kidney stone    • LUIS FERNANDO (obstructive sleep apnea)    • Osteopenia    • Osteoporosis 10/18/2017   • Other and unspecified hyperlipidemia    • Pigmentary retinopathy    • Type II or unspecified type diabetes mellitus without mention of complication, uncontrolled    • Unspecified essential hypertension    •  Unspecified hypothyroidism     Unspecified vitamin D deficiency      Past Surgical History:   Procedure Laterality Date    COLONOSCOPY N/A 4/10/2018    COLONOSCOPY performed by [facility]ice [facility]j [facility] at [facility] ENDOSCOPY    DELIVERY       x2    HX CHOLECYSTECTOMY      HX OTHER SURGICAL  3/10/16    gastric sleeve    HX POLYPECTOMY      HX TONSILLECTOMY       Allergies   Allergen Reactions    Niacin Other (comments)     Skin irritation    Sulfa (Sulfonamide Antibiotics) Unknown (comments)    Tricor [Fenofibrate Micronized] Swelling     Current Outpatient Medications   Medication Sig Dispense Refill    omega 3-DHA-EPA-fish oil 1,000 mg (120 mg-180 mg) capsule Take 1 Cap by mouth daily.  gabapentin (NEURONTIN) 300 mg capsule Take 1 Cap by mouth daily. Max Daily Amount: 300 mg. 90 Cap 2    metFORMIN ER (GLUCOPHAGE XR) 500 mg tablet TAKE 1 TABLET BY MOUTH TWICE DAILY WITH MEALS 180 Tab 3    glucose blood VI test strips (True Metrix Glucose Test Strip) strip USE TO CHECK BLOOD SUGAR ONCE DAILY 100 Strip 0    levothyroxine (SYNTHROID) 88 mcg tablet TK 1 T PO D B ELIN 90 Tab 1    rosuvastatin (CRESTOR) 20 mg tablet TAKE 1 TABLET DAILY 90 Tab 3    Blood-Glucose Meter (Relion Confirm) monitoring kit Please use to check BG  fasting blood sugar 1 time in a day 1 Kit 0    fenofibrate nanocrystallized (TRICOR) 145 mg tablet TAKE 1 TABLET DAILY FOR HIGH AMOUNT OF TRIGLYCERIDE IN THE BLOOD. 90 Tab 3    losartan (COZAAR) 100 mg tablet Take 1 Tab by mouth daily. 90 Tab 3    calcium citrate-vitamin D3 (CITRACAL + D) tablet Take  by mouth two (2) times a day.  B.infantis-B.ani-B.long-B.bifi (PROBIOTIC 4X) 10-15 mg TbEC Take  by mouth.  silver sulfADIAZINE (SILVADENE) 1 % topical cream Apply  to affected area as needed.  MULTIVITAMIN/IRON/FOLIC ACID (CENTRUM COMPLETE PO) Take  by mouth daily.  fluticasone (FLONASE) 50 mcg/actuation nasal spray nightly.       CETIRIZINE HCL (ZYRTEC PO) Take 10 mg by mouth daily. Social History     Socioeconomic History    Marital status:      Spouse name: Not on file    Number of children: Not on file    Years of education: Not on file    Highest education level: Not on file   Tobacco Use    Smoking status: Never Smoker    Smokeless tobacco: Never Used   Substance and Sexual Activity    Alcohol use: Yes     Frequency: Monthly or less     Drinks per session: 1 or 2     Binge frequency: Never     Comment: maybe a few times a year at most    Drug use: No    Sexual activity: Not Currently   Social History Narrative    Lives in [place] with  of 44 years. Has one daughter and one son. Retired   at [employer]. Likes to read and play on the computer. Family History   Problem Relation Age of Onset    Diabetes Father     Stroke Father     Cancer Mother         lung    Other Mother         aortic aneurysm       Review of Systems  ROS is unremarkable except for ones highlighted in bold.    Constitutional: negative for fevers, chills, anorexia and weight loss  Eyes:   negative for visual disturbance and irritation  ENT:   negative for tinnitus,sore throat,nasal congestion,ear pain,hoarseness  Respiratory:  negative for cough, hemoptysis, dyspnea,wheezing  CV:   negative for chest pain, palpitations, lower extremity edema  GI:   negative for nausea, vomiting, diarrhea, abdominal pain,melena  Endo:               negative for polyuria,polydipsia,polyphagia,heat intolerance  Genitourinary: negative for frequency, dysuria and hematuria  Integumentary: negative for rash and pruritus  Hematologic:  negative for easy bruising and gum/nose bleeding  Musculoskel: negative for myalgias, arthralgias, back pain, muscle weakness, joint pain  Neurological:  negative for headaches, dizziness, vertigo, memory problems and gait   Behavl/Psych: negative for feelings of anxiety, depression, mood changes  ROS otherwise negative     Objective:  Visit Vitals  /68 (BP 1 Location: Left arm, BP Patient Position: Sitting)   Pulse (!) 56   Temp 98.3 °F (36.8 °C)   Resp 14   Ht 5' 2\" (1.575 m)   Wt 183 lb (83 kg)   LMP  (LMP Unknown)   SpO2 99%   BMI 33.47 kg/m²     Physical Exam:   General appearance - alert, well appearing, and in no distress  Mental status - alert, oriented to person, place, and time  EYE-FADIA, EOMI, fundi normal, corneas normal, no foreign bodies  ENT-ENT exam normal, no neck nodes or sinus tenderness  Nose - normal and patent, no erythema, discharge or polyps  Mouth - mucous membranes moist, pharynx normal without lesions  Neck - supple, no significant adenopathy   Chest - clear to auscultation, no wheezes, rales or rhonchi, symmetric air entry   Heart - normal rate, regular rhythm, normal S1, S2, no murmurs, rubs, clicks or gallops   Abdomen - soft, nontender, nondistended, no masses or organomegaly, healed scar from gastric sleeve  Lymph- no adenopathy palpable  Ext-peripheral pulses normal, no pedal edema, no clubbing or cyanosis  Skin-Warm and dry. no hyperpigmentation, vitiligo, or suspicious lesions  Neuro -alert, oriented, normal speech, no focal findings or movement disorder noted  Musculoskeletal- FROM, no bony abnormalities, no point tenderness    Left Foot: Inspection: normal.  Pulse DP: 2+ (normal). Filament test: normal sensation with micro filament. Vibratory sensation: normal.  Right Foot: Inspection: normal.  Pulse DP: 2+ (normal). Filament test: normal sensation with micro filament.   Vibratory sensation: normal.      Lab Review:  Results for orders placed or performed in visit on 09/09/20   CBC W/O DIFF   Result Value Ref Range    WBC 6.4 4.1 - 10.9 K/uL    RBC 3.90 (L) 4.20 - 6.30 M/uL    HGB 11.6 (L) 12.0 - 18.0 g/dL    HCT 35.6 (L) 37.0 - 51.0 %    MCH 29.7 26.0 - 32.0 pg    MCHC 32.6 30.0 - 36.0 g/dL    MCV 91.4 80.0 - 97.0 fL    RDW 14.4 %    PLATELET 164.1 551.8 - 440.0 K/uL METABOLIC PANEL, COMPREHENSIVE   Result Value Ref Range    Glucose 167 (H) 65 - 105 mg/dL    BUN 25.0 (H) 7.0 - 17.0 mg/dL    Creatinine 1.3 (H) 0.7 - 1.2 mg/dL    Sodium 144 137 - 145 mmol/L    Potassium 4.9 3.6 - 5.0 mmol/L    Chloride 104 98 - 107 mmol/L    CO2 27.0 22.0 - 32.0 mmol/L    Calcium 10.0 8.4 - 10.2 mg/dl    Protein, total 7.5 6.3 - 8.2 g/dL    Albumin 4.8 3.9 - 5.4 g/dL    ALT (SGPT) 40 (H) 0 - 35 U/L    AST (SGOT) 54.0 (H) 14.0 - 36.0 U/L    Alk. phosphatase 76 38 - 126 U/L    Bilirubin, total 0.5 0.2 - 1.3 mg/dL    BUN/Creatinine ratio 19 Ratio    GFR est AA 49 <60 mL/min/1.73m2    GFR est non-AA 40 <60 mL/min/1.73m2    Globulin 2.70     A-G Ratio 1.8 Ratio    Anion gap 13 mmol/L        Documenation Review:    Assessment/Plan:    Diagnoses and all orders for this visit:    1. Encounter for annual physical exam    2. Encounter for colorectal cancer screening    3. Encounter for screening mammogram for breast cancer    4. Essential hypertension  -     CBC W/O DIFF  -     METABOLIC PANEL, COMPREHENSIVE  -     URINALYSIS W/MICROSCOPIC    5. Hypertriglyceridemia  -     LIPID PANEL    6. Type 2 diabetes mellitus without complication, without long-term current use of insulin (HCC)  -     HEMOGLOBIN A1C W/O EAG  -     URINE, MICROALBUMIN, SEMIQUANTITATIVE  -     HM DIABETES FOOT EXAM    7. Hyperlipidemia LDL goal <100    8. Acquired hypothyroidism  -     TSH 3RD GENERATION    9. Osteoporosis, unspecified osteoporosis type, unspecified pathological fracture presence    10. Anemia in stage 4 chronic kidney disease ([facility] [address]    11. Vitamin D deficiency  -     VITAMIN D, 25 HYDROXY    12. S/P gastric bypass    13. Neuropathy  -     gabapentin (NEURONTIN) 300 mg capsule; Take 1 Cap by mouth daily. Max Daily Amount: 300 mg. Patient is up-to-date on all her health screenings and immunizations. Flu vaccine, Pneumovax 23, Prevnar 13, Zostavax, Wilmer vaccine Tdap is up-to-date.     Mammogram negative in October 2020.  Colonoscopy in April, 2018, status post polypectomy. Should be repeated in 5 years, 2023. Eye examination is current. DEXA scan June, 2020normal.  Continue current meds. I will call with lab results and make further recommendations or adjustments if necessary. Discussed lifestyle modifications including Na restriction, low carb/fat diet, weight reduction and exercise (at least a walking program). ICD-10-CM ICD-9-CM    1. Encounter for annual physical exam  Z00.00 V70.0    2. Encounter for colorectal cancer screening  Z12.11 V76.51     Z12.12 V76.41    3. Encounter for screening mammogram for breast cancer  Z12.31 V76.12    4. Essential hypertension  I10 401.9 CBC W/O DIFF      METABOLIC PANEL, COMPREHENSIVE      URINALYSIS W/MICROSCOPIC   5. Hypertriglyceridemia  E78.1 272.1 LIPID PANEL   6. Type 2 diabetes mellitus without complication, without long-term current use of insulin (HCC)  E11.9 250.00 HEMOGLOBIN A1C W/O EAG      URINE, MICROALBUMIN, SEMIQUANTITATIVE      HM DIABETES FOOT EXAM   7. Hyperlipidemia LDL goal <100  E78.5 272.4    8. Acquired hypothyroidism  E03.9 244.9 TSH 3RD GENERATION   9. Osteoporosis, unspecified osteoporosis type, unspecified pathological fracture presence  M81.0 733.00    10. Anemia in stage 4 chronic kidney disease (HCC)  N18.4 285.21     D63.1 585.4    11. Vitamin D deficiency  E55.9 268.9 VITAMIN D, 25 HYDROXY   12. S/P gastric bypass  Z98.84 V45.86    13. Neuropathy  G62.9 355.9 gabapentin (NEURONTIN) 300 mg capsule             I have reviewed with the patient details of the assessment and plan and all questions were answered. Relevent patient education was performed. Verbal and/or written instructions (see AVS) provided. The most recent lab findings were reviewed with the patient. Plan was discussed with patient who verbally expressed understanding. An After Visit Summary was printed and given to the patient.     Katelyn Arceo MD

## 2021-01-11 ENCOUNTER — OFFICE VISIT (OUTPATIENT)
Dept: INTERNAL MEDICINE CLINIC | Age: 71
End: 2021-01-11
Payer: MEDICARE

## 2021-01-11 VITALS
WEIGHT: 183 LBS | RESPIRATION RATE: 14 BRPM | HEART RATE: 56 BPM | BODY MASS INDEX: 33.68 KG/M2 | HEIGHT: 62 IN | SYSTOLIC BLOOD PRESSURE: 128 MMHG | TEMPERATURE: 98.3 F | DIASTOLIC BLOOD PRESSURE: 68 MMHG | OXYGEN SATURATION: 99 %

## 2021-01-11 DIAGNOSIS — N39.0 URINARY TRACT INFECTION WITHOUT HEMATURIA, SITE UNSPECIFIED: ICD-10-CM

## 2021-01-11 DIAGNOSIS — G62.9 NEUROPATHY: ICD-10-CM

## 2021-01-11 DIAGNOSIS — E03.9 ACQUIRED HYPOTHYROIDISM: ICD-10-CM

## 2021-01-11 DIAGNOSIS — N18.4 ANEMIA IN STAGE 4 CHRONIC KIDNEY DISEASE (HCC): ICD-10-CM

## 2021-01-11 DIAGNOSIS — Z12.11 ENCOUNTER FOR COLORECTAL CANCER SCREENING: ICD-10-CM

## 2021-01-11 DIAGNOSIS — Z12.12 ENCOUNTER FOR COLORECTAL CANCER SCREENING: ICD-10-CM

## 2021-01-11 DIAGNOSIS — Z00.00 ENCOUNTER FOR ANNUAL PHYSICAL EXAM: Primary | ICD-10-CM

## 2021-01-11 DIAGNOSIS — Z98.84 S/P GASTRIC BYPASS: ICD-10-CM

## 2021-01-11 DIAGNOSIS — M81.0 OSTEOPOROSIS, UNSPECIFIED OSTEOPOROSIS TYPE, UNSPECIFIED PATHOLOGICAL FRACTURE PRESENCE: ICD-10-CM

## 2021-01-11 DIAGNOSIS — E78.5 HYPERLIPIDEMIA LDL GOAL <100: ICD-10-CM

## 2021-01-11 DIAGNOSIS — E78.1 HYPERTRIGLYCERIDEMIA: ICD-10-CM

## 2021-01-11 DIAGNOSIS — E11.9 TYPE 2 DIABETES MELLITUS WITHOUT COMPLICATION, WITHOUT LONG-TERM CURRENT USE OF INSULIN (HCC): ICD-10-CM

## 2021-01-11 DIAGNOSIS — E55.9 VITAMIN D DEFICIENCY: ICD-10-CM

## 2021-01-11 DIAGNOSIS — Z12.31 ENCOUNTER FOR SCREENING MAMMOGRAM FOR BREAST CANCER: ICD-10-CM

## 2021-01-11 DIAGNOSIS — I10 ESSENTIAL HYPERTENSION: ICD-10-CM

## 2021-01-11 DIAGNOSIS — D63.1 ANEMIA IN STAGE 4 CHRONIC KIDNEY DISEASE (HCC): ICD-10-CM

## 2021-01-11 LAB
25(OH)D3 SERPL-MCNC: 55 NG/ML (ref 30–96)
A-G RATIO,AGRAT: 1.5 RATIO
ALBUMIN SERPL-MCNC: 4.9 G/DL (ref 3.9–5.4)
ALP SERPL-CCNC: 73 U/L (ref 38–126)
ALT SERPL-CCNC: 31 U/L (ref 0–35)
ANION GAP SERPL CALC-SCNC: 12 MMOL/L
AST SERPL W P-5'-P-CCNC: 43 U/L (ref 14–36)
BILIRUB SERPL-MCNC: 0.5 MG/DL (ref 0.2–1.3)
BUN SERPL-MCNC: 27 MG/DL (ref 7–17)
BUN/CREATININE RATIO,BUCR: 21 RATIO
CALCIUM SERPL-MCNC: 10.4 MG/DL (ref 8.4–10.2)
CHLORIDE SERPL-SCNC: 103 MMOL/L (ref 98–107)
CHOL/HDL RATIO,CHHD: 4 RATIO (ref 0–4)
CHOLEST SERPL-MCNC: 182 MG/DL (ref 0–200)
CO2 SERPL-SCNC: 30 MMOL/L (ref 22–32)
CREAT SERPL-MCNC: 1.3 MG/DL (ref 0.7–1.2)
ERYTHROCYTE [DISTWIDTH] IN BLOOD BY AUTOMATED COUNT: 14 %
GLOBULIN,GLOB: 3.2
GLUCOSE SERPL-MCNC: 174 MG/DL (ref 65–105)
HBA1C MFR BLD HPLC: 7.4 % (ref 4–5.7)
HCT VFR BLD AUTO: 38.9 % (ref 37–51)
HDLC SERPL-MCNC: 43 MG/DL (ref 35–130)
HGB BLD-MCNC: 12.3 G/DL (ref 12–18)
LDL/HDL RATIO,LDHD: 2 RATIO
LDLC SERPL CALC-MCNC: 81 MG/DL (ref 0–130)
MCH RBC QN AUTO: 29.2 PG (ref 26–32)
MCHC RBC AUTO-ENTMCNC: 31.6 G/DL (ref 30–36)
MCV RBC AUTO: 92.4 FL (ref 80–97)
PLATELET # BLD AUTO: 215 K/UL (ref 140–440)
POTASSIUM SERPL-SCNC: 4.3 MMOL/L (ref 3.6–5)
PROT SERPL-MCNC: 8.1 G/DL (ref 6.3–8.2)
RBC # BLD AUTO: 4.21 M/UL (ref 4.2–6.3)
SODIUM SERPL-SCNC: 145 MMOL/L (ref 137–145)
TRIGL SERPL-MCNC: 291 MG/DL (ref 0–200)
TSH SERPL DL<=0.05 MIU/L-ACNC: 0.71 UIU/ML (ref 0.34–5.6)
VLDLC SERPL CALC-MCNC: 58 MG/DL
WBC # BLD AUTO: 6.9 K/UL (ref 4.1–10.9)

## 2021-01-11 PROCEDURE — G8510 SCR DEP NEG, NO PLAN REQD: HCPCS | Performed by: INTERNAL MEDICINE

## 2021-01-11 PROCEDURE — 80061 LIPID PANEL: CPT | Performed by: INTERNAL MEDICINE

## 2021-01-11 PROCEDURE — 1101F PT FALLS ASSESS-DOCD LE1/YR: CPT | Performed by: INTERNAL MEDICINE

## 2021-01-11 PROCEDURE — 80053 COMPREHEN METABOLIC PANEL: CPT | Performed by: INTERNAL MEDICINE

## 2021-01-11 PROCEDURE — 2022F DILAT RTA XM EVC RTNOPTHY: CPT | Performed by: INTERNAL MEDICINE

## 2021-01-11 PROCEDURE — 3017F COLORECTAL CA SCREEN DOC REV: CPT | Performed by: INTERNAL MEDICINE

## 2021-01-11 PROCEDURE — 82306 VITAMIN D 25 HYDROXY: CPT | Performed by: INTERNAL MEDICINE

## 2021-01-11 PROCEDURE — 84443 ASSAY THYROID STIM HORMONE: CPT | Performed by: INTERNAL MEDICINE

## 2021-01-11 PROCEDURE — 82044 UR ALBUMIN SEMIQUANTITATIVE: CPT | Performed by: INTERNAL MEDICINE

## 2021-01-11 PROCEDURE — G8754 DIAS BP LESS 90: HCPCS | Performed by: INTERNAL MEDICINE

## 2021-01-11 PROCEDURE — G8427 DOCREV CUR MEDS BY ELIG CLIN: HCPCS | Performed by: INTERNAL MEDICINE

## 2021-01-11 PROCEDURE — G9899 SCRN MAM PERF RSLTS DOC: HCPCS | Performed by: INTERNAL MEDICINE

## 2021-01-11 PROCEDURE — G8752 SYS BP LESS 140: HCPCS | Performed by: INTERNAL MEDICINE

## 2021-01-11 PROCEDURE — 99214 OFFICE O/P EST MOD 30 MIN: CPT | Performed by: INTERNAL MEDICINE

## 2021-01-11 PROCEDURE — 85027 COMPLETE CBC AUTOMATED: CPT | Performed by: INTERNAL MEDICINE

## 2021-01-11 PROCEDURE — 3046F HEMOGLOBIN A1C LEVEL >9.0%: CPT | Performed by: INTERNAL MEDICINE

## 2021-01-11 PROCEDURE — 1090F PRES/ABSN URINE INCON ASSESS: CPT | Performed by: INTERNAL MEDICINE

## 2021-01-11 PROCEDURE — 81001 URINALYSIS AUTO W/SCOPE: CPT | Performed by: INTERNAL MEDICINE

## 2021-01-11 PROCEDURE — G8536 NO DOC ELDER MAL SCRN: HCPCS | Performed by: INTERNAL MEDICINE

## 2021-01-11 PROCEDURE — 83036 HEMOGLOBIN GLYCOSYLATED A1C: CPT | Performed by: INTERNAL MEDICINE

## 2021-01-11 PROCEDURE — G0439 PPPS, SUBSEQ VISIT: HCPCS | Performed by: INTERNAL MEDICINE

## 2021-01-11 PROCEDURE — G8417 CALC BMI ABV UP PARAM F/U: HCPCS | Performed by: INTERNAL MEDICINE

## 2021-01-11 RX ORDER — GABAPENTIN 300 MG/1
300 CAPSULE ORAL DAILY
Qty: 90 CAP | Refills: 2 | Status: SHIPPED | OUTPATIENT
Start: 2021-01-11 | End: 2021-06-15 | Stop reason: SDUPTHER

## 2021-01-11 RX ORDER — GLUCOSAM/CHONDRO/HERB 149/HYAL 750-100 MG
1 TABLET ORAL DAILY
COMMUNITY
End: 2021-04-13

## 2021-01-11 NOTE — PATIENT INSTRUCTIONS
The best way to stay healthy is to live a healthy lifestyle. A healthy lifestyle includes regular exercise, eating a well-balanced diet, keeping a healthy weight and not smoking. Regular physical exams and screening tests are another important way to take care of yourself. Preventive exams provided by health care providers can find health problems early when treatment works best and can keep you from getting certain diseases or illnesses. Preventive services include exams, lab tests, screenings, shots, monitoring and information to help you take care of your own health. All people over 65 should have a pneumonia shot. Pneumonia shots are usually only needed once in a lifetime unless your doctor decides differently. In addition to your physical exam, some screening tests are recommended:    All people over 65 should have a yearly flu shot. People over 65 are at medium to high risk for Hepatitis B. Three shots are needed for complete protection. Bone mass measurement (dexa scan) is recommended every two years. Diabetes Mellitus screening is recommended every year. Glaucoma is an eye disease caused by high pressure in the eye. An eye exam is recommended every year. Cardiovascular screening tests that check your cholesterol and other blood fat (lipid) levels are recommended every five years. Colorectal Cancer screening tests help to find pre-cancerous polyps (growths in the colon) so they can be removed before they turn into cancer. Tests ordered for screening depend on your personal and family history risk factors. Prostate Cancer Screening (annually up to age 76)    Screening for breast cancer is recommended yearly with a Mammogram.    Screening for cervical and vaginal cancer is recommended with a pelvic and Pap test every two years.  However if you have had an abnormal pap in the past  three years or at high risk for cervical or vaginal cancer Medicare will cover a pap test and a pelvic exam every year.      Here is a list of your current Health Maintenance items with a due date:  Health Maintenance Due   Topic Date Due    DTaP/Tdap/Td  (1 - Tdap) 08/15/1971    Annual Well Visit  12/30/2020

## 2021-01-11 NOTE — PROGRESS NOTES
Chief Complaint   Patient presents with    Annual Wellness Visit       Depression Risk Factor Screening:     3 most recent PHQ Screens 1/11/2021   Little interest or pleasure in doing things Not at all   Feeling down, depressed, irritable, or hopeless Not at all   Total Score PHQ 2 0       Functional Ability and Level of Safety:     Activities of Daily Living  ADL Assessment 1/11/2021   Feeding yourself No Help Needed   Getting from bed to chair No Help Needed   Getting dressed No Help Needed   Bathing or showering No Help Needed   Walk across the room (includes cane/walker) No Help Needed   Using the telphone No Help Needed   Taking your medications No Help Needed   Preparing meals No Help Needed   Managing money (expenses/bills) No Help Needed   Moderately strenuous housework (laundry) No Help Needed   Shopping for personal items (toiletries/medicines) No Help Needed   Shopping for groceries No Help Needed   Driving No Help Needed   Climbing a flight of stairs No Help Needed   Getting to places beyond walking distances No Help Needed       Fall Risk  Fall Risk Assessment, last 12 mths 1/11/2021   Able to walk? Yes   Fall in past 12 months? 0   Do you feel unsteady? 0   Are you worried about falling 0       Abuse Screen  Abuse Screening Questionnaire 1/11/2021   Do you ever feel afraid of your partner? N   Are you in a relationship with someone who physically or mentally threatens you? N   Is it safe for you to go home?  Y         Patient Care Team   Patient Care Team:  Yelena Harris MD as PCP - General (Hospitalist)  Yelena Harris MD as PCP - Chidi Whitman Provider  Andriy Rosales MD as Consulting Provider (Endocrinology)

## 2021-01-12 DIAGNOSIS — E11.9 TYPE 2 DIABETES MELLITUS WITHOUT COMPLICATION, WITHOUT LONG-TERM CURRENT USE OF INSULIN (HCC): ICD-10-CM

## 2021-01-12 LAB
BACTERIA,BACTU: ABNORMAL
BILIRUB UR QL: NEGATIVE
CLARITY: CLEAR
COLOR UR: ABNORMAL
GLUCOSE 24H UR-MRATE: ABNORMAL G/(24.H)
HGB UR QL STRIP: NEGATIVE
KETONES UR QL STRIP.AUTO: NEGATIVE
LEUKOCYTE ESTERASE: ABNORMAL
MICROALBUMIN, URINE: 50 MG/L (ref 0–20)
NITRITE UR QL STRIP.AUTO: NEGATIVE
PH UR STRIP: 5 [PH] (ref 5–7)
PROT UR STRIP-MCNC: NEGATIVE MG/DL
RBC #/AREA URNS HPF: 0 #/HPF
SP GR UR REFRACTOMETRY: 1.02 (ref 1–1.03)
UROBILINOGEN UR QL STRIP.AUTO: NEGATIVE
WBC URNS QL MICRO: ABNORMAL #/HPF

## 2021-01-12 RX ORDER — METFORMIN HYDROCHLORIDE 500 MG/1
1000 TABLET, EXTENDED RELEASE ORAL 2 TIMES DAILY WITH MEALS
Qty: 360 TAB | Refills: 1 | Status: SHIPPED | OUTPATIENT
Start: 2021-01-12 | End: 2021-04-13

## 2021-01-12 NOTE — PROGRESS NOTES
Tried glycerides have improved some. There is still [place]. Instead of omega-3 fatty acids I think patient will benefit from Vascepa. Let me know if she will be interested? Recommend to increase Metformin to 1000 mg p.o. twice daily with meals. (Currently she is taking 500 mg p.o. twice daily).   Rest of the labs look fine

## 2021-01-13 ENCOUNTER — TELEPHONE (OUTPATIENT)
Dept: INTERNAL MEDICINE CLINIC | Age: 71
End: 2021-01-13

## 2021-01-13 DIAGNOSIS — E78.1 HYPERTRIGLYCERIDEMIA: Primary | ICD-10-CM

## 2021-01-13 RX ORDER — ICOSAPENT ETHYL 1000 MG/1
2 CAPSULE ORAL 2 TIMES DAILY WITH MEALS
Qty: 360 CAP | Refills: 0 | Status: SHIPPED | OUTPATIENT
Start: 2021-01-13 | End: 2021-03-11

## 2021-01-13 NOTE — TELEPHONE ENCOUNTER
Patient notified of results and states she would be interested in trying the [address]. Please send to her pharmacy.

## 2021-01-13 NOTE — TELEPHONE ENCOUNTER
----- Message from Albert Parkinson MD sent at 1/13/2021  1:22 PM EST -----  Tried glycerides have improved some. There is still room for improvement    instead of omega-3 fatty acids I think patient will benefit from Vascepa.  Let me know if she will be interested? Recommend to increase Metformin to 1000 mg p.o. twice daily with meals.  (Currently she is taking 500 mg p.o. twice daily).   Rest of the labs look fine

## 2021-01-13 NOTE — PROGRESS NOTES
Tried glycerides have improved some. There is still room for improvement    instead of omega-3 fatty acids I think patient will benefit from Vascepa.  Let me know if she will be interested? Recommend to increase Metformin to 1000 mg p.o. twice daily with meals.  (Currently she is taking 500 mg p.o. twice daily).   Rest of the labs look fine

## 2021-01-13 NOTE — TELEPHONE ENCOUNTER
Gaurav Hogan MD  You Just now (4:40 PM)     I have sent in a prescription of Vascepa.  (Medicine to lower your triglycerides)   Please see coverage. She will need to stop TriCor.  (Fenofibrate).  She should not be taking both TriCor and Vascepa together.     Message text        Patient notified

## 2021-01-16 LAB — BACTERIA UR CULT: NORMAL

## 2021-03-03 DIAGNOSIS — E78.1 HYPERTRIGLYCERIDEMIA: ICD-10-CM

## 2021-03-05 DIAGNOSIS — E11.21 TYPE 2 DIABETES WITH NEPHROPATHY (HCC): ICD-10-CM

## 2021-03-05 DIAGNOSIS — E11.9 TYPE 2 DIABETES MELLITUS WITHOUT COMPLICATION, WITHOUT LONG-TERM CURRENT USE OF INSULIN (HCC): ICD-10-CM

## 2021-03-11 RX ORDER — CALCIUM CITRATE/VITAMIN D3 200MG-6.25
TABLET ORAL
Qty: 100 STRIP | Refills: 0 | Status: SHIPPED | OUTPATIENT
Start: 2021-03-11 | End: 2021-06-07

## 2021-03-11 RX ORDER — ICOSAPENT ETHYL 1000 MG/1
CAPSULE ORAL
Qty: 360 CAP | Refills: 0 | Status: SHIPPED | OUTPATIENT
Start: 2021-03-11 | End: 2021-10-27 | Stop reason: ALTCHOICE

## 2021-03-11 NOTE — TELEPHONE ENCOUNTER
Requested Prescriptions     Pending Prescriptions Disp Refills   • True Metrix Glucose Test Strip strip [Pharmacy Med Name: TRUE METRIX BLOOD GLUCOSE TEST STRP] 100 Strip 0     Sig: USE TO CHECK BLOOD SUGAR ONCE DAILY

## 2021-03-11 NOTE — TELEPHONE ENCOUNTER
PCP: Philippe Palafox MD    Last appt: 1/11/2021  Future Appointments   Date Time Provider Royer Bowers   4/13/2021  9:00 AM Philippe Palafox MD [facility] AMB       Requested Prescriptions     Pending Prescriptions Disp Refills    Vascepa 1 gram capsule [Pharmacy Med Name: Genaro Estrada Cap 0     Sig: START WITH TAKING 1 CAPSULE TWICE DAILY AND THEN INCREASE TO 2 CAPSULES BY MOUTH TWICE DAILY AFTER ONE WEEK       Prior labs and Blood pressures:  BP Readings from Last 3 Encounters:   01/11/21 128/68   09/09/20 134/70   06/09/20 150/88     Lab Results   Component Value Date/Time    Sodium 145 01/11/2021 09:58 AM    Potassium 4.3 01/11/2021 09:58 AM    Chloride 103 01/11/2021 09:58 AM    CO2 30.0 01/11/2021 09:58 AM    Anion gap 12 01/11/2021 09:58 AM    Glucose 174 (H) 01/11/2021 09:58 AM    BUN 27.0 (H) 01/11/2021 09:58 AM    Creatinine 1.3 (H) 01/11/2021 09:58 AM    BUN/Creatinine ratio 21 01/11/2021 09:58 AM    GFR est AA 49 01/11/2021 09:58 AM    GFR est non-AA 40 01/11/2021 09:58 AM    Calcium 10.4 (H) 01/11/2021 09:58 AM     Lab Results   Component Value Date/Time    Hemoglobin A1c 7.4 (H) 01/11/2021 09:57 AM    Hemoglobin A1c (POC) 5.7 10/12/2018 08:26 AM    Hemoglobin A1c, External 7.9 11/05/2015     Lab Results   Component Value Date/Time    Cholesterol, total 182 01/11/2021 09:58 AM    Cholesterol (POC) 146.0 10/12/2018 08:26 AM    HDL Cholesterol 43 01/11/2021 09:58 AM    HDL Cholesterol (POC) 41.0 10/12/2018 08:26 AM    LDL Cholesterol (POC) 54.2 10/12/2018 08:26 AM    LDL, calculated 81 01/11/2021 09:58 AM    VLDL, calculated 62 (H) 11/17/2014 05:17 PM    VLDL 58 01/11/2021 09:58 AM    Triglyceride 291 (H) 01/11/2021 09:58 AM    Triglycerides (POC) 254.0 (A) 10/12/2018 08:26 AM    CHOL/HDL Ratio 4 01/11/2021 09:58 AM     Lab Results   Component Value Date/Time    VITAMIN D, 25-HYDROXY 55 01/11/2021 09:57 AM       Lab Results   Component Value Date/Time    TSH 0.551 11/17/2014 05:17 PM    TSH, 3rd generation 0.71 01/11/2021 09:57 AM

## 2021-04-12 NOTE — PROGRESS NOTES
Jennifer Gibson is a 79 y.o. female and presents with Hypertension (3 mo fu), Diabetes, and Cholesterol Problem      Subjective:  Patient comes in today for follow-up of hypertension, diabetes and hyperlipidemia. Type 2 diabetes, uncontrolled. A1c 7.4. Last office visit recommended to increase metformin to 1000 mg p.o. twice daily. She does admit not to be very compliant with her diet and exercise. She is on gabapentin for intermittent numbness and tingling sensation bilateral feet. Dyslipidemia and specific hypertriglyceridemia. She remains on a statin and was initiated on Vascepa. Denies any muscle cramps, myalgias. Tolerating medication well. Blood pressure well controlled on current meds. Hypothyroidism, on replacement. Levothyroxine 88 mcg. Last TSH was within normal limits.     Past Medical History:   Diagnosis Date    Abnormal kidney function 10/18/2017    Abnormal presence of protein in urine 10/18/2017    Acute serous otitis media of left ear 10/18/2017    Comments: recurrence not specified    Allergic rhinitis     Anemia     Anemia in chronic kidney disease 10/18/2017    Annual physical exam 10/18/2017    Back pain     Basal cell carcinoma     of tip of nose    Boil of trunk 10/18/2017    Bronchitis 10/18/2017    Carcinoma ([facility] [address] 10/18/2017    Comments: basal cell nose s/p excision    Chest pain, exertional 10/18/2017    Cough 10/18/2017    Dry mouth     Eczema 10/18/2017    Comments: intrinsic     History of cholecystectomy 10/18/2017    Comments: open 1989    HTN (hypertension) 10/18/2017    Kidney stone     LUIS FERNANDO (obstructive sleep apnea)     Osteopenia     Osteoporosis 10/18/2017    Other and unspecified hyperlipidemia     Pigmentary retinopathy     Type II or unspecified type diabetes mellitus without mention of complication, uncontrolled     Unspecified essential hypertension     Unspecified hypothyroidism     Unspecified vitamin D deficiency      Past Surgical History:   Procedure Laterality Date    COLONOSCOPY N/A 4/10/2018    COLONOSCOPY performed by Booker Roque MD at [facility] ENDOSCOPY    DELIVERY       x2    HX CHOLECYSTECTOMY      HX OTHER SURGICAL  3/10/16    gastric sleeve    HX POLYPECTOMY      HX TONSILLECTOMY       Allergies   Allergen Reactions    Niacin Other (comments)     Skin irritation    Sulfa (Sulfonamide Antibiotics) Unknown (comments)    Tricor [Fenofibrate Micronized] Swelling     Current Outpatient Medications   Medication Sig Dispense Refill    Vascepa 1 gram capsule START WITH TAKING 1 CAPSULE TWICE DAILY AND THEN INCREASE TO 2 CAPSULES BY MOUTH TWICE DAILY AFTER ONE WEEK 360 Cap 0    True Metrix Glucose Test Strip strip USE TO CHECK BLOOD SUGAR ONCE DAILY 100 Strip 0    metFORMIN ER (GLUCOPHAGE XR) 500 mg tablet Take 2 Tabs by mouth two (2) times daily (with meals) for 90 days. 360 Tab 1    gabapentin (NEURONTIN) 300 mg capsule Take 1 Cap by mouth daily. Max Daily Amount: 300 mg. 90 Cap 2    levothyroxine (SYNTHROID) 88 mcg tablet TK 1 T PO D B ELIN 90 Tab 1    rosuvastatin (CRESTOR) 20 mg tablet TAKE 1 TABLET DAILY 90 Tab 3    Blood-Glucose Meter (Relion Confirm) monitoring kit Please use to check BG  fasting blood sugar 1 time in a day 1 Kit 0    losartan (COZAAR) 100 mg tablet Take 1 Tab by mouth daily. 90 Tab 3    calcium citrate-vitamin D3 (CITRACAL + D) tablet Take  by mouth two (2) times a day.  B.infantis-B.ani-B.long-B.bifi (PROBIOTIC 4X) 10-15 mg TbEC Take  by mouth.  MULTIVITAMIN/IRON/FOLIC ACID (CENTRUM COMPLETE PO) Take  by mouth daily.  fluticasone (FLONASE) 50 mcg/actuation nasal spray nightly.  CETIRIZINE HCL (ZYRTEC PO) Take 10 mg by mouth daily.        Social History     Socioeconomic History    Marital status:      Spouse name: Not on file    Number of children: Not on file    Years of education: Not on file    Highest education level: Not on file   Tobacco Use  Smoking status: Never Smoker    Smokeless tobacco: Never Used   Substance and Sexual Activity    Alcohol use: Yes     Frequency: Monthly or less     Drinks per session: 1 or 2     Binge frequency: Never     Comment: maybe a few times a year at most    Drug use: No    Sexual activity: Not Currently   Social History Narrative    Lives in [place] with  of 44 years. Has one daughter and one son. Retired   at [employer]. Likes to read and play on the computer. Family History   Problem Relation Age of Onset    Diabetes Father     Stroke Father     Cancer Mother         lung    Other Mother         aortic aneurysm       Review of Systems  ROS is unremarkable except for ones highlighted in bold.    Constitutional: negative for fevers, chills, anorexia and weight loss  Eyes:   negative for visual disturbance and irritation  ENT:   negative for tinnitus,sore throat,nasal congestion,ear pain,hoarseness  Respiratory:  negative for cough, hemoptysis, dyspnea,wheezing  CV:   negative for chest pain, palpitations, lower extremity edema  GI:   negative for nausea, vomiting, diarrhea, abdominal pain,melena  Endo:               negative for polyuria,polydipsia,polyphagia,heat intolerance  Genitourinary: negative for frequency, dysuria and hematuria  Integumentary: negative for rash and pruritus  Hematologic:  negative for easy bruising and gum/nose bleeding  Musculoskel: negative for myalgias, arthralgias, back pain, muscle weakness, joint pain  Neurological:  negative for headaches, dizziness, vertigo, memory problems and gait   Behavl/Psych: negative for feelings of anxiety, depression, mood changes  ROS otherwise negative     Objective:  Visit Vitals  /74 (BP 1 Location: Left upper arm, BP Patient Position: Sitting, BP Cuff Size: Large adult)   Pulse 65   Temp 98.6 °F (37 °C)   Resp 14   Ht 5' 2\" (1.575 m)   Wt 185 lb (83.9 kg)   LMP  (LMP Unknown)   SpO2 95%   BMI 33.84 kg/m²     Physical Exam:   General appearance - alert, well appearing, and in no distress  Mental status - alert, oriented to person, place, and time  EYE-FADIA, EOMI, fundi normal, corneas normal, no foreign bodies  ENT-ENT exam normal, no neck nodes or sinus tenderness  Nose - normal and patent, no erythema, discharge or polyps  Mouth - mucous membranes moist, pharynx normal without lesions  Neck - supple, no significant adenopathy   Chest - clear to auscultation, no wheezes, rales or rhonchi, symmetric air entry   Heart - normal rate, regular rhythm, normal S1, S2, no murmurs, rubs, clicks or gallops   Abdomen - soft, nontender, nondistended, no masses or organomegaly, healed scar from gastric sleeve  Lymph- no adenopathy palpable  Ext-peripheral pulses normal, no pedal edema, no clubbing or cyanosis  Skin-Warm and dry.  no hyperpigmentation, vitiligo, or suspicious lesions  Neuro -alert, oriented, normal speech, no focal findings or movement disorder noted  Musculoskeletal- FROM, no bony abnormalities, no point tenderness      Lab Review:  Results for orders placed or performed in visit on 01/11/21   CULTURE, URINE    Specimen: Urine   Result Value Ref Range    Urine Culture, Routine       Mixed urogenital tete  10,000-25,000 colony forming units per mL     CBC W/O DIFF   Result Value Ref Range    WBC 6.9 4.1 - 10.9 K/uL    RBC 4.21 4.20 - 6.30 M/uL    HGB 12.3 12.0 - 18.0 g/dL    HCT 38.9 37.0 - 51.0 %    MCH 29.2 26.0 - 32.0 pg    MCHC 31.6 30.0 - 36.0 g/dL    MCV 92.4 80.0 - 97.0 fL    RDW 14.0 %    PLATELET 813.0 587.2 - 072.3 K/uL   METABOLIC PANEL, COMPREHENSIVE   Result Value Ref Range    Glucose 174 (H) 65 - 105 mg/dL    BUN 27.0 (H) 7.0 - 17.0 mg/dL    Creatinine 1.3 (H) 0.7 - 1.2 mg/dL    Sodium 145 137 - 145 mmol/L    Potassium 4.3 3.6 - 5.0 mmol/L    Chloride 103 98 - 107 mmol/L    CO2 30.0 22.0 - 32.0 mmol/L    Calcium 10.4 (H) 8.4 - 10.2 mg/dl    Protein, total 8.1 6.3 - 8.2 g/dL    Albumin 4.9 3.9 - 5.4 g/dL    ALT (SGPT) 31 0 - 35 U/L    AST (SGOT) 43.0 (H) 14.0 - 36.0 U/L    Alk. phosphatase 73 38 - 126 U/L    Bilirubin, total 0.5 0.2 - 1.3 mg/dL    BUN/Creatinine ratio 21 Ratio    GFR est AA 49 <60 mL/min/1.73m2    GFR est non-AA 40 <60 mL/min/1.73m2    Globulin 3.20     A-G Ratio 1.5 Ratio    Anion gap 12 mmol/L   LIPID PANEL   Result Value Ref Range    Cholesterol, total 182 0 - 200 mg/dL    Triglyceride 291 (H) 0 - 200 mg/dL    HDL Cholesterol 43 35 - 130 mg/dL    VLDL 58 mg/dL    LDL, calculated 81 0 - 130 mg/dL    CHOL/HDL Ratio 4 0 - 4 Ratio    LDL/HDL Ratio 2 Ratio   HEMOGLOBIN A1C W/O EAG   Result Value Ref Range    Hemoglobin A1c 7.4 (H) 4.0 - 5.7 %   VITAMIN D, 25 HYDROXY   Result Value Ref Range    VITAMIN D, 25-HYDROXY 55 30 - 96 ng/mL   TSH 3RD GENERATION   Result Value Ref Range    TSH, 3rd generation 0.71 0.34 - 5.60 uIU/mL   URINE, MICROALBUMIN, SEMIQUANTITATIVE   Result Value Ref Range    Microalbumin, Urine 50 (A) 0 - 20 mg/L   URINALYSIS W/MICROSCOPIC   Result Value Ref Range    Color Pale Yellow Pale Yellow - Yellow    CLARITY clear Clear    Glucose urine, 24 hr 3+ (A) Negative    Ketone Negative Negative    Bilirubin Negative Negative    Specific gravity 1.025 1.000 - 1.030    pH (UA) 5 5 - 7    Blood Negative Negative    Protein Negative Negative    Urobilinogen Negative Negative    Nitrites Negative Negative    Leukocyte Esterase 2+ (A) Negative    WBC 10-20 (A) 0 #/HPF    RBC 0 0 #/HPF    Bacteria Occasional (A) None Seen        Documenation Review:    Assessment/Plan:    Diagnoses and all orders for this visit:    1. Essential hypertension    2. [facility]    3. Hyperlipidemia LDL goal <100  -     LIPID PANEL  -     METABOLIC PANEL, COMPREHENSIVE    4. Hypertriglyceridemia    5. Anemia in stage 4 chronic kidney disease ([facility] [address]    6. Acquired hypothyroidism        Continue current meds.   I will call with lab results and make further recommendations or adjustments if necessary. Discussed lifestyle modifications including Na restriction, low carb/fat diet, weight reduction and exercise (at least a walking program). ICD-10-CM ICD-9-CM    1. Essential hypertension  I10 401.9    2. Type 2 diabetes with nephropathy (HCC)  E11.21 250.40      583.81    3. Hyperlipidemia LDL goal <100  E78.5 272.4 LIPID PANEL      METABOLIC PANEL, COMPREHENSIVE   4. Hypertriglyceridemia  E78.1 272.1    5. Anemia in stage 4 chronic kidney disease (HCC)  N18.4 285.21     D63.1 585.4    6. Acquired hypothyroidism  E03.9 244.9          Follow-up and Dispositions    · Return in about 3 months (around 7/13/2021) for follow up. I have reviewed with the patient details of the assessment and plan and all questions were answered. Relevent patient education was performed. Verbal and/or written instructions (see AVS) provided. The most recent lab findings were reviewed with the patient. Plan was discussed with patient who verbally expressed understanding. An After Visit Summary was printed and given to the patient.     Aide Dexter MD

## 2021-04-13 ENCOUNTER — OFFICE VISIT (OUTPATIENT)
Dept: INTERNAL MEDICINE CLINIC | Age: 71
End: 2021-04-13
Payer: MEDICARE

## 2021-04-13 VITALS
DIASTOLIC BLOOD PRESSURE: 74 MMHG | WEIGHT: 185 LBS | OXYGEN SATURATION: 95 % | SYSTOLIC BLOOD PRESSURE: 132 MMHG | TEMPERATURE: 98.6 F | HEART RATE: 65 BPM | BODY MASS INDEX: 34.04 KG/M2 | HEIGHT: 62 IN | RESPIRATION RATE: 14 BRPM

## 2021-04-13 DIAGNOSIS — I10 ESSENTIAL HYPERTENSION: Primary | ICD-10-CM

## 2021-04-13 DIAGNOSIS — D63.1 ANEMIA IN STAGE 4 CHRONIC KIDNEY DISEASE (HCC): ICD-10-CM

## 2021-04-13 DIAGNOSIS — E11.21 TYPE 2 DIABETES WITH NEPHROPATHY (HCC): ICD-10-CM

## 2021-04-13 DIAGNOSIS — E78.1 HYPERTRIGLYCERIDEMIA: ICD-10-CM

## 2021-04-13 DIAGNOSIS — E78.5 HYPERLIPIDEMIA LDL GOAL <100: ICD-10-CM

## 2021-04-13 DIAGNOSIS — E03.9 ACQUIRED HYPOTHYROIDISM: ICD-10-CM

## 2021-04-13 DIAGNOSIS — N18.4 ANEMIA IN STAGE 4 CHRONIC KIDNEY DISEASE (HCC): ICD-10-CM

## 2021-04-13 LAB
A-G RATIO,AGRAT: 1.6 RATIO
ALBUMIN SERPL-MCNC: 4.6 G/DL (ref 3.9–5.4)
ALP SERPL-CCNC: 114 U/L (ref 38–126)
ALT SERPL-CCNC: 13 U/L (ref 0–35)
ANION GAP SERPL CALC-SCNC: 16 MMOL/L
AST SERPL W P-5'-P-CCNC: 22 U/L (ref 14–36)
BILIRUB SERPL-MCNC: 0.6 MG/DL (ref 0.2–1.3)
BUN SERPL-MCNC: 20 MG/DL (ref 7–17)
BUN/CREATININE RATIO,BUCR: 20 RATIO
CALCIUM SERPL-MCNC: 9.8 MG/DL (ref 8.4–10.2)
CHLORIDE SERPL-SCNC: 105 MMOL/L (ref 98–107)
CHOL/HDL RATIO,CHHD: 4 RATIO (ref 0–4)
CHOLEST SERPL-MCNC: 151 MG/DL (ref 0–200)
CO2 SERPL-SCNC: 25 MMOL/L (ref 22–32)
CREAT SERPL-MCNC: 1 MG/DL (ref 0.7–1.2)
GLOBULIN,GLOB: 2.8
GLUCOSE SERPL-MCNC: 168 MG/DL (ref 65–105)
HDLC SERPL-MCNC: 41 MG/DL (ref 35–130)
LDL/HDL RATIO,LDHD: 1 RATIO
LDLC SERPL CALC-MCNC: 49 MG/DL (ref 0–130)
POTASSIUM SERPL-SCNC: 4.2 MMOL/L (ref 3.6–5)
PROT SERPL-MCNC: 7.4 G/DL (ref 6.3–8.2)
SODIUM SERPL-SCNC: 146 MMOL/L (ref 137–145)
TRIGL SERPL-MCNC: 307 MG/DL (ref 0–200)
VLDLC SERPL CALC-MCNC: 61 MG/DL

## 2021-04-13 PROCEDURE — 1101F PT FALLS ASSESS-DOCD LE1/YR: CPT | Performed by: INTERNAL MEDICINE

## 2021-04-13 PROCEDURE — 2022F DILAT RTA XM EVC RTNOPTHY: CPT | Performed by: INTERNAL MEDICINE

## 2021-04-13 PROCEDURE — G8432 DEP SCR NOT DOC, RNG: HCPCS | Performed by: INTERNAL MEDICINE

## 2021-04-13 PROCEDURE — G8417 CALC BMI ABV UP PARAM F/U: HCPCS | Performed by: INTERNAL MEDICINE

## 2021-04-13 PROCEDURE — 3051F HG A1C>EQUAL 7.0%<8.0%: CPT | Performed by: INTERNAL MEDICINE

## 2021-04-13 PROCEDURE — G9899 SCRN MAM PERF RSLTS DOC: HCPCS | Performed by: INTERNAL MEDICINE

## 2021-04-13 PROCEDURE — 80050 GENERAL HEALTH PANEL: CPT | Performed by: INTERNAL MEDICINE

## 2021-04-13 PROCEDURE — G8427 DOCREV CUR MEDS BY ELIG CLIN: HCPCS | Performed by: INTERNAL MEDICINE

## 2021-04-13 PROCEDURE — G8752 SYS BP LESS 140: HCPCS | Performed by: INTERNAL MEDICINE

## 2021-04-13 PROCEDURE — G8536 NO DOC ELDER MAL SCRN: HCPCS | Performed by: INTERNAL MEDICINE

## 2021-04-13 PROCEDURE — 80061 LIPID PANEL: CPT | Performed by: INTERNAL MEDICINE

## 2021-04-13 PROCEDURE — 3017F COLORECTAL CA SCREEN DOC REV: CPT | Performed by: INTERNAL MEDICINE

## 2021-04-13 PROCEDURE — G8754 DIAS BP LESS 90: HCPCS | Performed by: INTERNAL MEDICINE

## 2021-04-13 PROCEDURE — 1090F PRES/ABSN URINE INCON ASSESS: CPT | Performed by: INTERNAL MEDICINE

## 2021-04-13 PROCEDURE — 99214 OFFICE O/P EST MOD 30 MIN: CPT | Performed by: INTERNAL MEDICINE

## 2021-04-13 RX ORDER — FENOFIBRATE 145 MG/1
TABLET, COATED ORAL DAILY
COMMUNITY
End: 2021-04-13

## 2021-04-13 NOTE — PATIENT INSTRUCTIONS
Hyperlipidemia: After Your Visit Your Care Instructions Hyperlipidemia is too much fat in your blood. The body has several kinds of fat, including cholesterol and triglycerides. Your body needs fat for many things, such as making new cells. But too much fat in your blood increases your chances of having a heart attack or stroke. You may be able to lower your cholesterol and triglycerides with a heart-healthy diet, exercise, and if needed, medicine. Your doctor may want you to try lifestyle changes first to see whether they lower the fat in your blood. You may need to take medicine if lifestyle changes do not lower the fat in your blood enough. Follow-up care is a key part of your treatment and safety. Be sure to make and go to all appointments, and call your doctor if you are having problems. Its also a good idea to know your test results and keep a list of the medicines you take. How can you care for yourself at home? Take your medicines · Take your medicines exactly as prescribed. Call your doctor if you think you are having a problem with your medicine. · If you take medicine to lower your cholesterol, go to follow-up visits. You will need to have blood tests. · Do not take large doses of niacin, which is a B vitamin, while taking medicine called statins. It may increase the chance of muscle pain and liver problems. · Talk to your doctor about avoiding grapefruit juice if you are taking statins. Grapefruit juice can raise the level of this medicine in your blood. This could increase side effects. Eat more fruits, vegetables, and fiber · Fruits and vegetables have lots of nutrients that help protect against heart disease, and they have littleif anyfat. Try to eat at least five servings a day. Dark green, deep orange, or yellow fruits and vegetables are healthy choices. · Keep carrots, celery, and other veggies handy for snacks.  Buy fruit that is in season and store it where you can see it so that you will be tempted to eat it. Cook dishes that have a lot of veggies in them, such as stir-fries and soups. · Foods high in fiber may reduce your cholesterol and provide important vitamins and minerals. High-fiber foods include whole-grain cereals and breads, oatmeal, beans, brown rice, citrus fruits, and apples. · Buy whole-grain breads and cereals instead of white bread and pastries. Limit saturated fat · Read food labels and try to avoid saturated fat and trans fat. They increase your risk of heart disease. · Use olive or canola oil when you cook. Try cholesterol-lowering spreads, such as Benecol or Take Control. · Bake, broil, grill, or steam foods instead of frying them. · Limit the amount of high-fat meats you eat, including hot dogs and sausages. Cut out all visible fat when you prepare meat. · Eat fish, skinless poultry, and soy products such as tofu instead of high-fat meats. Soybeans may be especially good for your heart. Eat at least two servings of fish a week. Certain fish, such as salmon, contain omega-3 fatty acids, which may help reduce your risk of heart attack. · Choose low-fat or fat-free milk and dairy products. Get exercise, limit alcohol, and quit smoking · Get more exercise. Work with your doctor to set up an exercise program. Even if you can do only a small amount, exercise will help you get stronger, have more energy, and manage your weight and your stress. Walking is an easy way to get exercise. Gradually increase the amount you walk every day. Aim for at least 30 minutes on most days of the week. You also may want to swim, bike, or do other activities. · Limit alcohol to no more than 2 drinks a day for men and 1 drink a day for women. · Do not smoke. If you need help quitting, talk to your doctor about stop-smoking programs and medicines. These can increase your chances of quitting for good. When should you call for help?  
Call 911 anytime you think you may need emergency care. For example, call if: 
· You have symptoms of a heart attack. These may include: ¨ Chest pain or pressure, or a strange feeling in the chest. 
¨ Sweating. ¨ Shortness of breath. ¨ Nausea or vomiting. ¨ Pain, pressure, or a strange feeling in the back, neck, jaw, or upper belly or in one or both shoulders or arms. ¨ Lightheadedness or sudden weakness. ¨ A fast or irregular heartbeat. After you call 911, the  may tell you to chew 1 adult-strength or 2 to 4 low-dose aspirin. Wait for an ambulance. Do not try to drive yourself. · You have signs of a stroke. These may include: 
¨ Sudden numbness, paralysis, or weakness in your face, arm, or leg, especially on only one side of your body. ¨ New problems with walking or balance. ¨ Sudden vision changes. ¨ Drooling or slurred speech. ¨ New problems speaking or understanding simple statements, or feeling confused. ¨ A sudden, severe headache that is different from past headaches. · You passed out (lost consciousness). Call your doctor now or seek immediate medical care if: 
· You have muscle pain or weakness. Watch closely for changes in your health, and be sure to contact your doctor if: 
· You are very tired. · You have an upset stomach, gas, constipation, or belly pain or cramps. Where can you learn more? Go to [employer] Enter C406 in the search box to learn more about \"Hyperlipidemia: After Your Visit. \"  
© 8526-9118 [employer]. Care instructions adapted under license by [facility] (which disclaims liability or warranty for this information). This care instruction is for use with your licensed healthcare professional. If you have questions about a medical condition or this instruction, always ask your healthcare professional. [place] [address] any warranty or liability for your use of this information. Content Version: 7.3.445797; Last Revised: October 13, 2011

## 2021-04-13 NOTE — PROGRESS NOTES
Wang Marino is a 79 y.o. female presenting for Hypertension (3 mo fu), Diabetes, and Cholesterol Problem  . 1. Have you been to the ER, urgent care clinic since your last visit? Hospitalized since your last visit? No    2. Have you seen or consulted any other health care providers outside of the [address] [place] [address] since your last visit? Include any pap smears or colon screening. No    Fall Risk Assessment, last 12 mths 1/11/2021   Able to walk? Yes   Fall in past 12 months? 0   Do you feel unsteady? 0   Are you worried about falling 0         Abuse Screening Questionnaire 1/11/2021   Do you ever feel afraid of your partner? N   Are you in a relationship with someone who physically or mentally threatens you? N   Is it safe for you to go home? Y       3 most recent PHQ Screens 1/11/2021   Little interest or pleasure in doing things Not at all   Feeling down, depressed, irritable, or hopeless Not at all   Total Score PHQ 2 0       There are no discontinued medications.

## 2021-04-13 NOTE — PROGRESS NOTES
Triglycerides remain high. You need better diabetic control. Blood sugars are high. Increase Vascepa to 2 tablets 2 times in a day.

## 2021-05-12 RX ORDER — LEVOTHYROXINE SODIUM 88 UG/1
TABLET ORAL
Qty: 90 TAB | Refills: 1 | Status: SHIPPED | OUTPATIENT
Start: 2021-05-12 | End: 2021-11-09 | Stop reason: SDUPTHER

## 2021-06-07 DIAGNOSIS — E11.9 TYPE 2 DIABETES MELLITUS WITHOUT COMPLICATION, WITHOUT LONG-TERM CURRENT USE OF INSULIN (HCC): ICD-10-CM

## 2021-06-07 DIAGNOSIS — E11.21 TYPE 2 DIABETES WITH NEPHROPATHY (HCC): ICD-10-CM

## 2021-06-07 RX ORDER — CALCIUM CITRATE/VITAMIN D3 200MG-6.25
TABLET ORAL
Qty: 100 STRIP | Refills: 0 | Status: SHIPPED | OUTPATIENT
Start: 2021-06-07 | End: 2021-09-03

## 2021-06-15 DIAGNOSIS — G62.9 NEUROPATHY: ICD-10-CM

## 2021-06-15 RX ORDER — GABAPENTIN 300 MG/1
300 CAPSULE ORAL DAILY
Qty: 90 CAPSULE | Refills: 2 | Status: SHIPPED | OUTPATIENT
Start: 2021-06-15 | End: 2021-12-14 | Stop reason: SDUPTHER

## 2021-06-15 NOTE — TELEPHONE ENCOUNTER
PCP: Allison Wylie MD    Last appt: 4/13/2021  Future Appointments   Date Time Provider Royer Bowers   7/22/2021  9:00 AM Allison Wylie MD [facility] AMB       Requested Prescriptions     Pending Prescriptions Disp Refills    gabapentin (NEURONTIN) 300 mg capsule 90 Capsule 2     Sig: Take 1 Capsule by mouth daily.  Max Daily Amount: 300 mg.       Prior labs and Blood pressures:  BP Readings from Last 3 Encounters:   04/13/21 132/74   01/11/21 128/68   09/09/20 134/70     Lab Results   Component Value Date/Time    Sodium 146 (H) 04/13/2021 09:57 AM    Potassium 4.2 04/13/2021 09:57 AM    Chloride 105 04/13/2021 09:57 AM    CO2 25.0 04/13/2021 09:57 AM    Anion gap 16 04/13/2021 09:57 AM    Glucose 168 (H) 04/13/2021 09:57 AM    BUN 20.0 (H) 04/13/2021 09:57 AM    Creatinine 1.0 04/13/2021 09:57 AM    BUN/Creatinine ratio 20 04/13/2021 09:57 AM    GFR est AA >60 04/13/2021 09:57 AM    GFR est non-AA 55 04/13/2021 09:57 AM    Calcium 9.8 04/13/2021 09:57 AM     Lab Results   Component Value Date/Time    Hemoglobin A1c 7.4 (H) 01/11/2021 09:57 AM    Hemoglobin A1c (POC) 5.7 10/12/2018 08:26 AM    Hemoglobin A1c, External 7.9 11/05/2015 12:00 AM     Lab Results   Component Value Date/Time    Cholesterol, total 151 04/13/2021 09:57 AM    Cholesterol (POC) 146.0 10/12/2018 08:26 AM    HDL Cholesterol 41 04/13/2021 09:57 AM    HDL Cholesterol (POC) 41.0 10/12/2018 08:26 AM    LDL Cholesterol (POC) 54.2 10/12/2018 08:26 AM    LDL, calculated 49 04/13/2021 09:57 AM    VLDL, calculated 62 (H) 11/17/2014 05:17 PM    VLDL 61 04/13/2021 09:57 AM    Triglyceride 307 (H) 04/13/2021 09:57 AM    Triglycerides (POC) 254.0 (A) 10/12/2018 08:26 AM    CHOL/HDL Ratio 4 04/13/2021 09:57 AM     Lab Results   Component Value Date/Time    VITAMIN D, 25-HYDROXY 55 01/11/2021 09:57 AM       Lab Results   Component Value Date/Time    TSH 0.551 11/17/2014 05:17 PM    TSH, 3rd generation 0.71 01/11/2021 09:57 AM

## 2021-07-19 RX ORDER — LOSARTAN POTASSIUM 100 MG/1
TABLET ORAL
Qty: 90 TABLET | Refills: 3 | Status: SHIPPED | OUTPATIENT
Start: 2021-07-19 | End: 2022-07-18 | Stop reason: SDUPTHER

## 2021-07-22 ENCOUNTER — OFFICE VISIT (OUTPATIENT)
Dept: INTERNAL MEDICINE CLINIC | Age: 71
End: 2021-07-22
Payer: MEDICARE

## 2021-07-22 VITALS
HEIGHT: 62 IN | HEART RATE: 73 BPM | SYSTOLIC BLOOD PRESSURE: 136 MMHG | BODY MASS INDEX: 33.68 KG/M2 | DIASTOLIC BLOOD PRESSURE: 74 MMHG | TEMPERATURE: 97.3 F | WEIGHT: 183 LBS | RESPIRATION RATE: 16 BRPM | OXYGEN SATURATION: 96 %

## 2021-07-22 DIAGNOSIS — G62.9 NEUROPATHY: ICD-10-CM

## 2021-07-22 DIAGNOSIS — E03.9 ACQUIRED HYPOTHYROIDISM: ICD-10-CM

## 2021-07-22 DIAGNOSIS — E11.21 TYPE 2 DIABETES WITH NEPHROPATHY (HCC): ICD-10-CM

## 2021-07-22 DIAGNOSIS — I12.9 HYPERTENSION, RENAL DISEASE: ICD-10-CM

## 2021-07-22 DIAGNOSIS — E78.5 HYPERLIPIDEMIA LDL GOAL <100: Primary | ICD-10-CM

## 2021-07-22 LAB
ALBUMIN SERPL-MCNC: 4 G/DL (ref 3.5–5)
ALBUMIN/GLOB SERPL: 1.3 {RATIO} (ref 1.1–2.2)
ALP SERPL-CCNC: 97 U/L (ref 45–117)
ALT SERPL-CCNC: 21 U/L (ref 12–78)
ANION GAP SERPL CALC-SCNC: 4 MMOL/L (ref 5–15)
AST SERPL-CCNC: 11 U/L (ref 15–37)
BILIRUB SERPL-MCNC: 0.4 MG/DL (ref 0.2–1)
BUN SERPL-MCNC: 20 MG/DL (ref 6–20)
BUN/CREAT SERPL: 19 (ref 12–20)
CALCIUM SERPL-MCNC: 9.5 MG/DL (ref 8.5–10.1)
CHLORIDE SERPL-SCNC: 107 MMOL/L (ref 97–108)
CHOLEST SERPL-MCNC: 166 MG/DL
CO2 SERPL-SCNC: 30 MMOL/L (ref 21–32)
CREAT SERPL-MCNC: 1.05 MG/DL (ref 0.55–1.02)
ERYTHROCYTE [DISTWIDTH] IN BLOOD BY AUTOMATED COUNT: 13.2 % (ref 11.5–14.5)
EST. AVERAGE GLUCOSE BLD GHB EST-MCNC: 180 MG/DL
GLOBULIN SER CALC-MCNC: 3.1 G/DL (ref 2–4)
GLUCOSE SERPL-MCNC: 198 MG/DL (ref 65–100)
HBA1C MFR BLD: 7.9 % (ref 4–5.6)
HCT VFR BLD AUTO: 34.8 % (ref 35–47)
HDLC SERPL-MCNC: 34 MG/DL
HDLC SERPL: 4.9 {RATIO} (ref 0–5)
HGB BLD-MCNC: 11.1 G/DL (ref 11.5–16)
LDLC SERPL CALC-MCNC: 63.6 MG/DL (ref 0–100)
MCH RBC QN AUTO: 29.3 PG (ref 26–34)
MCHC RBC AUTO-ENTMCNC: 31.9 G/DL (ref 30–36.5)
MCV RBC AUTO: 91.8 FL (ref 80–99)
NRBC # BLD: 0 K/UL (ref 0–0.01)
NRBC BLD-RTO: 0 PER 100 WBC
PLATELET # BLD AUTO: 177 K/UL (ref 150–400)
PMV BLD AUTO: 11.6 FL (ref 8.9–12.9)
POTASSIUM SERPL-SCNC: 4.5 MMOL/L (ref 3.5–5.1)
PROT SERPL-MCNC: 7.1 G/DL (ref 6.4–8.2)
RBC # BLD AUTO: 3.79 M/UL (ref 3.8–5.2)
SODIUM SERPL-SCNC: 141 MMOL/L (ref 136–145)
T4 FREE SERPL-MCNC: 1.3 NG/DL (ref 0.8–1.5)
TRIGL SERPL-MCNC: 342 MG/DL (ref ?–150)
TSH SERPL DL<=0.05 MIU/L-ACNC: 0.57 UIU/ML (ref 0.36–3.74)
VLDLC SERPL CALC-MCNC: 68.4 MG/DL
WBC # BLD AUTO: 6.5 K/UL (ref 3.6–11)

## 2021-07-22 PROCEDURE — 3017F COLORECTAL CA SCREEN DOC REV: CPT | Performed by: INTERNAL MEDICINE

## 2021-07-22 PROCEDURE — 1090F PRES/ABSN URINE INCON ASSESS: CPT | Performed by: INTERNAL MEDICINE

## 2021-07-22 PROCEDURE — G8417 CALC BMI ABV UP PARAM F/U: HCPCS | Performed by: INTERNAL MEDICINE

## 2021-07-22 PROCEDURE — 2022F DILAT RTA XM EVC RTNOPTHY: CPT | Performed by: INTERNAL MEDICINE

## 2021-07-22 PROCEDURE — 1101F PT FALLS ASSESS-DOCD LE1/YR: CPT | Performed by: INTERNAL MEDICINE

## 2021-07-22 PROCEDURE — G8752 SYS BP LESS 140: HCPCS | Performed by: INTERNAL MEDICINE

## 2021-07-22 PROCEDURE — G8536 NO DOC ELDER MAL SCRN: HCPCS | Performed by: INTERNAL MEDICINE

## 2021-07-22 PROCEDURE — G8754 DIAS BP LESS 90: HCPCS | Performed by: INTERNAL MEDICINE

## 2021-07-22 PROCEDURE — G9899 SCRN MAM PERF RSLTS DOC: HCPCS | Performed by: INTERNAL MEDICINE

## 2021-07-22 PROCEDURE — 3051F HG A1C>EQUAL 7.0%<8.0%: CPT | Performed by: INTERNAL MEDICINE

## 2021-07-22 PROCEDURE — G8432 DEP SCR NOT DOC, RNG: HCPCS | Performed by: INTERNAL MEDICINE

## 2021-07-22 PROCEDURE — 99214 OFFICE O/P EST MOD 30 MIN: CPT | Performed by: INTERNAL MEDICINE

## 2021-07-22 PROCEDURE — G8427 DOCREV CUR MEDS BY ELIG CLIN: HCPCS | Performed by: INTERNAL MEDICINE

## 2021-07-22 NOTE — PROGRESS NOTES
Danielle Jasso is a 79 y.o. female presenting for Hypertension (3 m fu) and Diabetes  . 1. Have you been to the ER, urgent care clinic since your last visit? Hospitalized since your last visit? No    2. Have you seen or consulted any other health care providers outside of the [address] [place] [address] since your last visit? Include any pap smears or colon screening. No    Fall Risk Assessment, last 12 mths 1/11/2021   Able to walk? Yes   Fall in past 12 months? 0   Do you feel unsteady? 0   Are you worried about falling 0         Abuse Screening Questionnaire 1/11/2021   Do you ever feel afraid of your partner? N   Are you in a relationship with someone who physically or mentally threatens you? N   Is it safe for you to go home? Y       3 most recent PHQ Screens 1/11/2021   Little interest or pleasure in doing things Not at all   Feeling down, depressed, irritable, or hopeless Not at all   Total Score PHQ 2 0       There are no discontinued medications.

## 2021-07-22 NOTE — PROGRESS NOTES
Natan Hewitt is a 79 y.o. female and presents with Hypertension (3 m fu) and Diabetes      Subjective:  Patient comes in today for follow-up. Blood work from last office visit were reviewed. Her triglycerides and A1c were. Recommended patient to increase Vascepa to 2 times in a day as well as her Metformin. She admits she has not been taking the evening dose very regularly as she misses it. Type 2 diabetes, uncontrolled. A1c 7.4. Currently takes Metformin 1000 mg p.o. daily. Often misses the evening dose. She does admit not to be very compliant with her diet and exercise. She is on gabapentin for intermittent numbness and tingling sensation bilateral feet. Dyslipidemia and specific hypertriglyceridemia. She remains on a statin and Vascepa. Denies any muscle cramps, myalgias. Tolerating medication well. Blood pressure well controlled on current meds. Denies headache or blurred vision. Hypothyroidism, on replacement. Levothyroxine 88 mcg. Last TSH was within normal limits.     Past Medical History:   Diagnosis Date    Abnormal kidney function 10/18/2017    Abnormal presence of protein in urine 10/18/2017    Acute serous otitis media of left ear 10/18/2017    Comments: recurrence not specified    Allergic rhinitis     Anemia     Anemia in chronic kidney disease 10/18/2017    Annual physical exam 10/18/2017    Back pain     Basal cell carcinoma     of tip of nose    Boil of trunk 10/18/2017    Bronchitis 10/18/2017    Carcinoma ([address] 10/18/2017    Comments: basal cell nose s/p excision    Chest pain, exertional 10/18/2017    Cough 10/18/2017    Dry mouth     Eczema 10/18/2017    Comments: intrinsic     History of cholecystectomy 10/18/2017    Comments: open 1989    HTN (hypertension) 10/18/2017    Kidney stone     LUIS FERNANDO (obstructive sleep apnea)     Osteopenia     Osteoporosis 10/18/2017    Other and unspecified hyperlipidemia     Pigmentary retinopathy     Type II or unspecified type diabetes mellitus without mention of complication, uncontrolled     Unspecified essential hypertension     Unspecified hypothyroidism     Unspecified vitamin D deficiency      Past Surgical History:   Procedure Laterality Date    COLONOSCOPY N/A 4/10/2018    COLONOS[facility] performed [facility]ila [facility] at [facility] ENDOSCOPY    DELIVERY       x2    HX CHOLECYSTECTOMY      HX OTHER SURGICAL  3/10/16    gastric sleeve    HX POLYPECTOMY      HX TONSILLECTOMY       Allergies   Allergen Reactions    Niacin Other (comments)     Skin irritation    Sulfa (Sulfonamide Antibiotics) Unknown (comments)    Tricor [Fenofibrate Micronized] Swelling     Current Outpatient Medications   Medication Sig Dispense Refill    losartan (COZAAR) 100 mg tablet TAKE 1 TABLET DAILY 90 Tablet 3    gabapentin (NEURONTIN) 300 mg capsule Take 1 Capsule by mouth daily. Max Daily Amount: 300 mg. 90 Capsule 2    True Metrix Glucose Test Strip strip USE TO CHECK BLOOD SUGAR ONCE DAILY 100 Strip 0    levothyroxine (SYNTHROID) 88 mcg tablet TAKE 1 TABLET BY MOUTH ONCE DAILY BEFORE BREAKFAST 90 Tab 1    Vascepa 1 gram capsule START WITH TAKING 1 CAPSULE TWICE DAILY AND THEN INCREASE TO 2 CAPSULES BY MOUTH TWICE DAILY AFTER ONE WEEK 360 Cap 0    rosuvastatin (CRESTOR) 20 mg tablet TAKE 1 TABLET DAILY 90 Tab 3    Blood-Glucose Meter (Relion Confirm) monitoring kit Please use to check BG  fasting blood sugar 1 time in a day 1 Kit 0    calcium citrate-vitamin D3 (CITRACAL + D) tablet Take  by mouth two (2) times a day.  B.infantis-B.ani-B.long-B.bifi (PROBIOTIC 4X) 10-15 mg TbEC Take  by mouth.  MULTIVITAMIN/IRON/FOLIC ACID (CENTRUM COMPLETE PO) Take  by mouth daily.  fluticasone (FLONASE) 50 mcg/actuation nasal spray nightly.  CETIRIZINE HCL (ZYRTEC PO) Take 10 mg by mouth daily.        Social History     Socioeconomic History    Marital status:      Spouse name: Not on file    Number of children: Not on file    Years of education: Not on file    Highest education level: Not on file   Tobacco Use    Smoking status: Never Smoker    Smokeless tobacco: Never Used   Vaping Use    Vaping Use: Never used   Substance and Sexual Activity    Alcohol use: Yes     Comment: maybe a few times a year at most    Drug use: No    Sexual activity: Not Currently   Social History Narrative    Lives in [place] with  of 44 years. Has one daughter and one son. Retired   at [employer]. Likes to read and play on the computer. Social Determinants of Health     Financial Resource Strain:     Difficulty of Paying Living Expenses:    Food Insecurity:     Worried About Running Out of Food in the Last Year:     [address] in the Last Year:    Transportation Needs:     Lack of Transportation (Medical):  Lack of Transportation (Non-Medical):    Physical Activity:     Days of Exercise per Week:     Minutes of Exercise per Session:    Stress:     Feeling of Stress :    Social Connections:     Frequency of Communication with Friends and Family:     Frequency of Social Gatherings with Friends and Family:     Attends [employer] Services:     Active Member of Clubs or Organizations:     Attends Club or Organization Meetings:     Marital Status:      Family History   Problem Relation Age of Onset    Diabetes Father     Stroke Father     Cancer Mother         lung    Other Mother         aortic aneurysm       Review of Systems  ROS is unremarkable except for ones highlighted in bold.    Constitutional: negative for fevers, chills, anorexia and weight loss  Eyes:   negative for visual disturbance and irritation  ENT:   negative for tinnitus,sore throat,nasal congestion,ear pain,hoarseness  Respiratory:  negative for cough, hemoptysis, dyspnea,wheezing  CV:   negative for chest pain, palpitations, lower extremity edema  GI:   negative for nausea, vomiting, diarrhea, abdominal pain,melena  Endo:               negative for polyuria,polydipsia,polyphagia,heat intolerance  Genitourinary: negative for frequency, dysuria and hematuria  Integumentary: negative for rash and pruritus  Hematologic:  negative for easy bruising and gum/nose bleeding  Musculoskel: negative for myalgias, arthralgias, back pain, muscle weakness, joint pain  Neurological:  negative for headaches, dizziness, vertigo, memory problems and gait   Behavl/Psych: negative for feelings of anxiety, depression, mood changes  ROS otherwise negative     Objective:  Visit Vitals  /74 (BP 1 Location: Left upper arm, BP Patient Position: Sitting, BP Cuff Size: Adult)   Pulse 73   Temp 97.3 °F (36.3 °C)   Resp 16   Ht 5' 2\" (1.575 m)   Wt 183 lb (83 kg)   LMP  (LMP Unknown)   SpO2 96%   BMI 33.47 kg/m²     Physical Exam:   General appearance - alert, well appearing, and in no distress  Mental status - alert, oriented to person, place, and time  EYE-FADIA, EOMI, fundi normal, corneas normal, no foreign bodies  ENT-ENT exam normal, no neck nodes or sinus tenderness  Nose - normal and patent, no erythema, discharge or polyps  Mouth - mucous membranes moist, pharynx normal without lesions  Neck - supple, no significant adenopathy   Chest - clear to auscultation, no wheezes, rales or rhonchi, symmetric air entry   Heart - normal rate, regular rhythm, normal S1, S2, no murmurs, rubs, clicks or gallops   Abdomen - soft, nontender, nondistended, no masses or organomegaly, healed scar from gastric sleeve  Lymph- no adenopathy palpable  Ext-peripheral pulses normal, no pedal edema, no clubbing or cyanosis  Skin-Warm and dry.  no hyperpigmentation, vitiligo, or suspicious lesions  Neuro -alert, oriented, normal speech, no focal findings or movement disorder noted  Musculoskeletal- FROM, no bony abnormalities, no point tenderness      Lab Review:  Results for orders placed or performed in visit on 04/13/21   LIPID PANEL   Result Value Ref Range    Cholesterol, total 151 0 - 200 mg/dL    Triglyceride 307 (H) 0 - 200 mg/dL    HDL Cholesterol 41 35 - 130 mg/dL    VLDL 61 mg/dL    LDL, calculated 49 0 - 130 mg/dL    CHOL/HDL Ratio 4 0 - 4 Ratio    LDL/HDL Ratio 1 Ratio   METABOLIC PANEL, COMPREHENSIVE   Result Value Ref Range    Glucose 168 (H) 65 - 105 mg/dL    BUN 20.0 (H) 7.0 - 17.0 mg/dL    Creatinine 1.0 0.7 - 1.2 mg/dL    Sodium 146 (H) 137 - 145 mmol/L    Potassium 4.2 3.6 - 5.0 mmol/L    Chloride 105 98 - 107 mmol/L    CO2 25.0 22.0 - 32.0 mmol/L    Calcium 9.8 8.4 - 10.2 mg/dl    Protein, total 7.4 6.3 - 8.2 g/dL    Albumin 4.6 3.9 - 5.4 g/dL    ALT (SGPT) 13 0 - 35 U/L    AST (SGOT) 22.0 14.0 - 36.0 U/L    Alk. phosphatase 114 38 - 126 U/L    Bilirubin, total 0.6 0.2 - 1.3 mg/dL    BUN/Creatinine ratio 20 Ratio    GFR est AA >60 mL/min/1.73m2    GFR est non-AA 55 <60 mL/min/1.73m2    Globulin 2.80     A-G Ratio 1.6 Ratio    Anion gap 16 mmol/L        Documenation Review:    Assessment/Plan:    Diagnoses and all orders for this visit:    1. Hyperlipidemia LDL goal <100  -     LIPID PANEL; Future    2. Type 2 diabetes with nephropathy (HCC)  -     HEMOGLOBIN A1C WITH EAG; Future    3. Neuropathy    4. Acquired hypothyroidism  -     T4, FREE; Future  -     TSH 3RD GENERATION; Future    5. Hypertension, renal disease  -     CBC W/O DIFF; Future  -     METABOLIC PANEL, COMPREHENSIVE; Future      Recommend patient increasing her Vascepa to 2 times in a day. Recommend her to increase her Metformin to 2 times in a day. Patient reports even though she has a painful block she is not very faithful in taking the evening doses and often misses them. Continue current meds. I will call with lab results and make further recommendations or adjustments if necessary. Discussed lifestyle modifications including Na restriction, low carb/fat diet, weight reduction and exercise (at least a walking program). ICD-10-CM ICD-9-CM    1. Hyperlipidemia LDL goal <100  E78.5 272.4 LIPID PANEL   2. Type 2 diabetes with nephropathy (HCC)  E11.21 250.40 HEMOGLOBIN A1C WITH EAG     583.81    3. Neuropathy  G62.9 355.9    4. Acquired hypothyroidism  E03.9 244.9 T4, FREE      TSH 3RD GENERATION   5. Hypertension, renal disease  I12.9 403.90 CBC W/O DIFF     696.4 METABOLIC PANEL, COMPREHENSIVE             I have reviewed with the patient details of the assessment and plan and all questions were answered. Relevent patient education was performed. Verbal and/or written instructions (see AVS) provided. The most recent lab findings were reviewed with the patient. Plan was discussed with patient who verbally expressed understanding. An After Visit Summary was printed and given to the patient.     Rodger Martin MD

## 2021-07-22 NOTE — PATIENT INSTRUCTIONS
Learning About Type 2 Diabetes  What is type 2 diabetes? Type 2 diabetes is a condition in which you have too much sugar (glucose) in your blood. Glucose is a type of sugar produced in your body when carbohydrates and other foods are digested. It provides energy to cells throughout the body. Normally, blood sugar levels increase after you eat a meal. When blood sugar rises, cells in the pancreas release insulin, which causes the body to absorb sugar from the blood and lowers the blood sugar level to normal.  When you have type 2 diabetes, sugar stays in the blood rather than entering the body's cells to be used for energy. This results in high blood sugar. It happens when your body can't use insulin the right way. Over time, high blood sugar can harm many parts of the body, such as your eyes, heart, blood vessels, nerves, and kidneys. It can also increase your risk for other health problems (complications). What can you expect with type 2 diabetes? Monica Gay keep hearing about how important it is to keep your blood sugar within a target range. That's because over time, high blood sugar can lead to serious problems. It can:  · Harm your eyes, nerves, and kidneys. · Damage your blood vessels, leading to heart disease and stroke. · Reduce blood flow and cause nerve damage to parts of your body, especially your feet. This can cause slow healing and pain when you walk. · Make your immune system weak and less able to fight infections. When people hear the word \"diabetes,\" they often think of problems like these. But daily care and treatment can help prevent or delay these problems. The goal is to keep your blood sugar in a target range. That's the best way to reduce your chance of having more problems from diabetes. What are the symptoms? Some people who have type 2 diabetes may not have any symptoms early on.  Many people with the disease don't even know they have it at first. But with time, diabetes starts to cause symptoms. You have most symptoms of type 2 diabetes when your blood sugar is either too high or too low. The most common symptoms of high blood sugar include:  · Thirst.  · Needing to urinate often. · Weight loss. · Blurry vision. The symptoms of low blood sugar include:  · Sweating. · Shakiness. · Weakness. · Hunger. · Confusion. You're not likely to get symptoms of low blood sugar unless you take insulin or use certain diabetes medicines that lower blood sugar. How can you help prevent type 2 diabetes? There are things you can do to help prevent type 2 diabetes. Stay at a healthy weight. Exercise regularly, and eat healthy foods. Even small changes can make a difference. If you have prediabetes, the medicine metformin can help prevent type 2 diabetes. How is type 2 diabetes treated? Treatment for type 2 diabetes will change over time to meet your needs. But the focus of your treatment will usually be to keep your blood sugar levels in your target range. This will help prevent problems such as eye, kidney, heart, blood vessel, and nerve disease. Some people may need medicines to help their bodies make insulin or decrease insulin resistance. Some medicines slow down how quickly the body absorbs carbohydrates. Treatment to manage type 2 diabetes includes:  · Making healthy food choices and being active. · Losing weight, if you need to. · Seeing your doctor regularly. · Keeping your blood sugar in your target range. · Taking medicines, if you need them. · Quitting smoking, if you smoke. · Keeping your blood pressure and cholesterol under control. Follow-up care is a key part of your treatment and safety. Be sure to make and go to all appointments, and call your doctor if you are having problems. It's also a good idea to know your test results and keep a list of the medicines you take. Where can you learn more?   Go to http://www.gray.com/  Enter [id number] in the search box to learn more about \"Learning About Type 2 Diabetes. \"  Current as of: August 31, 2020               Content Version: 12.8  © 2006-2021 [employer]. Care instructions adapted under license by [employer] (which disclaims liability or warranty for this information). If you have questions about a medical condition or this instruction, always ask your healthcare professional. [place] [address] any warranty or liability for your use of this information.

## 2021-07-23 NOTE — PROGRESS NOTES
Recommended to be complaint with the increased dose of meds(metformin and vascepa) A1C and triglycerides are increased.

## 2021-08-05 ENCOUNTER — TELEPHONE (OUTPATIENT)
Dept: INTERNAL MEDICINE CLINIC | Age: 71
End: 2021-08-05

## 2021-09-02 RX ORDER — ROSUVASTATIN CALCIUM 20 MG/1
TABLET, COATED ORAL
Qty: 90 TABLET | Refills: 3 | OUTPATIENT
Start: 2021-09-02

## 2021-09-03 ENCOUNTER — PATIENT MESSAGE (OUTPATIENT)
Dept: INTERNAL MEDICINE CLINIC | Age: 71
End: 2021-09-03

## 2021-09-03 DIAGNOSIS — E11.21 TYPE 2 DIABETES WITH NEPHROPATHY (HCC): ICD-10-CM

## 2021-09-03 DIAGNOSIS — E11.9 TYPE 2 DIABETES MELLITUS WITHOUT COMPLICATION, WITHOUT LONG-TERM CURRENT USE OF INSULIN (HCC): ICD-10-CM

## 2021-09-03 RX ORDER — CALCIUM CITRATE/VITAMIN D3 200MG-6.25
TABLET ORAL
Qty: 100 STRIP | Refills: 0 | Status: SHIPPED | OUTPATIENT
Start: 2021-09-03

## 2021-09-03 RX ORDER — ROSUVASTATIN CALCIUM 20 MG/1
20 TABLET, COATED ORAL DAILY
Qty: 90 TABLET | Refills: 3 | Status: SHIPPED | OUTPATIENT
Start: 2021-09-03 | End: 2021-09-07 | Stop reason: SDUPTHER

## 2021-09-03 NOTE — TELEPHONE ENCOUNTER
PCP: Jose Daniel Tidwell MD    Last appt: 2021  Future Appointments   Date Time Provider Royer Bowers   10/21/2021  8:00 AM Piper Sarmiento NP [facility]       Requested Prescriptions     Pending Prescriptions Disp Refills    rosuvastatin (CRESTOR) 20 mg tablet 90 Tablet 3     Si Tablet daily.          Other Comments:

## 2021-09-03 NOTE — TELEPHONE ENCOUNTER
Last Refill: 2020  Last Visit: 2021   Next Visit: Visit date not found    Requested Prescriptions     Pending Prescriptions Disp Refills    rosuvastatin (CRESTOR) 20 mg tablet 90 Tablet 3     Si Tablet daily. blurred vision L/blurred vision R

## 2021-09-07 RX ORDER — ROSUVASTATIN CALCIUM 20 MG/1
20 TABLET, COATED ORAL DAILY
Qty: 90 TABLET | Refills: 3 | Status: SHIPPED | OUTPATIENT
Start: 2021-09-07 | End: 2021-12-06

## 2021-09-07 NOTE — TELEPHONE ENCOUNTER
From: Anna Best  To: Poppy Rolon MD  Sent: 9/3/2021 10:05 AM EDT  Subject: Prescription Question    I received notice that my reuvostatin(crestor) would not be refilled because I had not been seen by Dr. Wanda العلي. Is his name now being used for Dr. Efrain Wilson patients? Please advise. Thank you! Called by primary team to assess gross hematuria. At bedside, old sanchez removed, containing turbid peach tea colored urine.  Using aseptic technique, 22F six eye catheter placed with ease. Irrigated with normal saline, 5cc of clot evacuated.   Six eye catheter removed, and using aseptic technique, 22F 3 way catheter placed with ease. (+) return of light peach colored urine. 30cc of sterile water inserted into the balloon. Patient tolerated procedure well. cytology, cultures, and fluid chemistries

## 2021-09-16 ENCOUNTER — DOCUMENTATION ONLY (OUTPATIENT)
Dept: INTERNAL MEDICINE CLINIC | Age: 71
End: 2021-09-16

## 2021-09-16 ENCOUNTER — TELEPHONE (OUTPATIENT)
Dept: INTERNAL MEDICINE CLINIC | Age: 71
End: 2021-09-16

## 2021-09-16 NOTE — TELEPHONE ENCOUNTER
Got a call from patient's dermatologist Dr. Casi Figueroa. 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 Studies show some association with hypothyroidism. We checked her TSH 2 months back which was WNL. Some possible association with diabetes. Her A1c 7.9. Concern for visceral malignancies. (Strongest association with esophageal, lung, breast, kidney, colon and  cancer. She recommend screening. Reviewed notes. Patient had a colonoscopy in 2018 which was unremarkable. A CT abdomen pelvis in 2019-no acute abnormality. Patient only has a cough on and off since the past few months. No loss in appetite, hemoptysis, bowel or bladder dysfunction. Her mammogram has been negative for malignancy. Discussed with Jessica Kidney, NP transitioning provider. Will order chest x-ray and repeat lab work.

## 2021-10-04 ENCOUNTER — TRANSCRIBE ORDER (OUTPATIENT)
Dept: SCHEDULING | Age: 71
End: 2021-10-04

## 2021-10-04 DIAGNOSIS — Z12.31 OTHER SCREENING MAMMOGRAM: Primary | ICD-10-CM

## 2021-10-27 ENCOUNTER — OFFICE VISIT (OUTPATIENT)
Dept: INTERNAL MEDICINE CLINIC | Age: 71
End: 2021-10-27
Payer: MEDICARE

## 2021-10-27 VITALS
WEIGHT: 182.4 LBS | HEIGHT: 62 IN | SYSTOLIC BLOOD PRESSURE: 150 MMHG | TEMPERATURE: 97.5 F | RESPIRATION RATE: 16 BRPM | HEART RATE: 78 BPM | DIASTOLIC BLOOD PRESSURE: 82 MMHG | OXYGEN SATURATION: 97 % | BODY MASS INDEX: 33.57 KG/M2

## 2021-10-27 DIAGNOSIS — I10 ESSENTIAL HYPERTENSION: ICD-10-CM

## 2021-10-27 DIAGNOSIS — E11.9 TYPE 2 DIABETES MELLITUS WITHOUT COMPLICATION, WITHOUT LONG-TERM CURRENT USE OF INSULIN (HCC): Primary | ICD-10-CM

## 2021-10-27 DIAGNOSIS — E78.2 MIXED HYPERLIPIDEMIA DUE TO TYPE 2 DIABETES MELLITUS (HCC): ICD-10-CM

## 2021-10-27 DIAGNOSIS — E11.21 TYPE 2 DIABETES WITH NEPHROPATHY (HCC): ICD-10-CM

## 2021-10-27 DIAGNOSIS — E78.5 HYPERLIPIDEMIA LDL GOAL <70: ICD-10-CM

## 2021-10-27 DIAGNOSIS — Z79.899 ON STATIN THERAPY: ICD-10-CM

## 2021-10-27 DIAGNOSIS — E11.69 MIXED HYPERLIPIDEMIA DUE TO TYPE 2 DIABETES MELLITUS (HCC): ICD-10-CM

## 2021-10-27 DIAGNOSIS — D64.9 ANEMIA, UNSPECIFIED TYPE: ICD-10-CM

## 2021-10-27 PROBLEM — E03.9 HYPOTHYROIDISM: Status: ACTIVE | Noted: 2020-03-11

## 2021-10-27 PROCEDURE — G8753 SYS BP > OR = 140: HCPCS | Performed by: NURSE PRACTITIONER

## 2021-10-27 PROCEDURE — G8427 DOCREV CUR MEDS BY ELIG CLIN: HCPCS | Performed by: NURSE PRACTITIONER

## 2021-10-27 PROCEDURE — 2022F DILAT RTA XM EVC RTNOPTHY: CPT | Performed by: NURSE PRACTITIONER

## 2021-10-27 PROCEDURE — 99214 OFFICE O/P EST MOD 30 MIN: CPT | Performed by: NURSE PRACTITIONER

## 2021-10-27 PROCEDURE — 1090F PRES/ABSN URINE INCON ASSESS: CPT | Performed by: NURSE PRACTITIONER

## 2021-10-27 PROCEDURE — G9899 SCRN MAM PERF RSLTS DOC: HCPCS | Performed by: NURSE PRACTITIONER

## 2021-10-27 PROCEDURE — G8417 CALC BMI ABV UP PARAM F/U: HCPCS | Performed by: NURSE PRACTITIONER

## 2021-10-27 PROCEDURE — 3051F HG A1C>EQUAL 7.0%<8.0%: CPT | Performed by: NURSE PRACTITIONER

## 2021-10-27 PROCEDURE — G8432 DEP SCR NOT DOC, RNG: HCPCS | Performed by: NURSE PRACTITIONER

## 2021-10-27 PROCEDURE — G8754 DIAS BP LESS 90: HCPCS | Performed by: NURSE PRACTITIONER

## 2021-10-27 PROCEDURE — 3017F COLORECTAL CA SCREEN DOC REV: CPT | Performed by: NURSE PRACTITIONER

## 2021-10-27 PROCEDURE — 1101F PT FALLS ASSESS-DOCD LE1/YR: CPT | Performed by: NURSE PRACTITIONER

## 2021-10-27 PROCEDURE — G8536 NO DOC ELDER MAL SCRN: HCPCS | Performed by: NURSE PRACTITIONER

## 2021-10-27 RX ORDER — TAZAROTENE 0.45 MG/G
LOTION TOPICAL
COMMUNITY

## 2021-10-27 RX ORDER — FENOFIBRATE 160 MG/1
160 TABLET ORAL DAILY
Qty: 90 TABLET | Refills: 1 | Status: SHIPPED | OUTPATIENT
Start: 2021-10-27 | End: 2022-04-22

## 2021-10-27 RX ORDER — METFORMIN HYDROCHLORIDE 500 MG/1
TABLET, EXTENDED RELEASE ORAL
COMMUNITY
Start: 2021-10-19 | End: 2022-01-18 | Stop reason: SDUPTHER

## 2021-10-27 RX ORDER — CLOBETASOL PROPIONATE 0.5 MG/G
CREAM TOPICAL
COMMUNITY
Start: 2021-08-12 | End: 2022-01-20 | Stop reason: ALTCHOICE

## 2021-10-27 RX ORDER — AMLODIPINE BESYLATE 2.5 MG/1
2.5 TABLET ORAL DAILY
Qty: 90 TABLET | Refills: 1 | Status: SHIPPED | OUTPATIENT
Start: 2021-10-27 | End: 2022-04-22

## 2021-10-27 NOTE — PROGRESS NOTES
Chief Complaint   Patient presents with    Diabetes     3 month follow up/Dr. Mirna Byrd       SUBJECTIVE:    Ursula Carney is a 70 y.o. female who is here today for a follow up appointment regarding her type 2 diabetes, hypertension, and mixed lipidemia. The patient is currently taking Metformin for management of her diabetes. However, she admits that she often forgets her evening dose. She is taking losartan 100 mg for management of her hypertension. She does not check her blood pressures on a regular basis. Her blood pressure appears rather elevated today. She is also taking rosuvastatin as well as Vascepa for management of her mixed lipidemia. She states the Vascepa is quite costly, and would prefer to resume fenofibrate if possible. In addition, she was shown to have some mild anemia on her last labs. We will recheck that today. She is currently being followed by dermatology for skin issues to the palms of her hands and soles of her feet. Current Outpatient Medications   Medication Sig Dispense Refill    silicones/castor oil (HAND PROTECTIVE EX) by Apply Externally route. Hand gel Keratoderma bare 40 gel cream      tazarotene (Arazlo) 0.045 % lotn by Apply Externally route.  rosuvastatin (CRESTOR) 20 mg tablet Take 1 Tablet by mouth daily for 90 days. 90 Tablet 3    True Metrix Glucose Test Strip strip USE TO CHECK BLOOD SUGAR ONCE DAILY 100 Strip 0    losartan (COZAAR) 100 mg tablet TAKE 1 TABLET DAILY 90 Tablet 3    gabapentin (NEURONTIN) 300 mg capsule Take 1 Capsule by mouth daily.  Max Daily Amount: 300 mg. 90 Capsule 2    levothyroxine (SYNTHROID) 88 mcg tablet TAKE 1 TABLET BY MOUTH ONCE DAILY BEFORE BREAKFAST 90 Tab 1    Vascepa 1 gram capsule START WITH TAKING 1 CAPSULE TWICE DAILY AND THEN INCREASE TO 2 CAPSULES BY MOUTH TWICE DAILY AFTER ONE WEEK 360 Cap 0    Blood-Glucose Meter (Relion Confirm) monitoring kit Please use to check BG  fasting blood sugar 1 time in a day 1 Kit 0    calcium citrate-vitamin D3 (CITRACAL + D) tablet Take  by mouth two (2) times a day.  B.infantis-B.ani-B.long-B.bifi (PROBIOTIC 4X) 10-15 mg TbEC Take  by mouth.  MULTIVITAMIN/IRON/FOLIC ACID (CENTRUM COMPLETE PO) Take  by mouth daily.  fluticasone (FLONASE) 50 mcg/actuation nasal spray nightly.  CETIRIZINE HCL (ZYRTEC PO) Take 10 mg by mouth daily.        Past Medical History:   Diagnosis Date    Abnormal kidney function 10/18/2017    Abnormal presence of protein in urine 10/18/2017    Acute serous otitis media of left ear 10/18/2017    Comments: recurrence not specified    Allergic rhinitis     Anemia     Anemia in chronic kidney disease 10/18/2017    Annual physical exam 10/18/2017    Back pain     Basal cell carcinoma     of tip of nose    Boil of trunk 10/18/2017    Bronchitis 10/18/2017    Carcinoma ([address] 10/18/2017    Comments: basal cell nose s/p excision    Chest pain, exertional 10/18/2017    Cough 10/18/2017    Dry mouth     Eczema 10/18/2017    Comments: intrinsic     History of cholecystectomy 10/18/2017    Comments: open 1989    HTN (hypertension) 10/18/2017    Kidney stone     LUIS FERNANDO (obstructive sleep apnea)     Osteopenia     Osteoporosis 10/18/2017    Other and unspecified hyperlipidemia     Pigmentary retinopathy     Type II or unspecified type diabetes mellitus without mention of complication, uncontrolled     Unspecified essential hypertension     Unspecified hypothyroidism     Unspecified vitamin D deficiency      Past Surgical History:   Procedure Laterality Date    COLONOSCOPY N/A 4/10/2018    COLONOSCOPY performed by Janice Rodríguez MD at [facility] ENDOSCOPY    DELIVERY       x2    HX CHOLECYSTECTOMY      HX OTHER SURGICAL  3/10/16    gastric sleeve    HX POLYPECTOMY      HX TONSILLECTOMY       Allergies   Allergen Reactions    Niacin Other (comments)     Skin irritation    Sulfa (Sulfonamide Antibiotics) Unknown (comments)    Tricor [Fenofibrate Micronized] Swelling       REVIEW OF SYSTEMS:                                        POSITIVE= bold text  Negative = regular text    General:                     fever, chills, sweats, generalized weakness, weight loss/gain,                                       loss of appetite   Eyes:                           blurred vision, eye pain, loss of vision, double vision  ENT:                            rhinorrhea, pharyngitis   Respiratory:               cough, sputum production, SOB, WOLFF, wheezing, pleuritic pain   Cardiology:                chest pain, palpitations, orthopnea, PND, edema, syncope   Gastrointestinal:       abdominal pain , N/V, diarrhea, dysphagia, constipation, bleeding   Genitourinary:           frequency, urgency, dysuria, hematuria, incontinence   Muskuloskeletal :      arthralgia, myalgia, back pain  Hematology:              easy bruising, nose or gum bleeding, lymphadenopathy   Dermatological:         rash, ulceration, pruritis, color change / jaundice, flaking skin with dryness  Endocrine:                 hot flashes or polydipsia   Neurological:             headache, dizziness, confusion, focal weakness, paresthesia,                                      Speech difficulties, memory loss, gait difficulty  Psychological:          Feelings of anxiety, depression, agitation        Social History     Socioeconomic History    Marital status:      Spouse name: Not on file    Number of children: Not on file    Years of education: Not on file    Highest education level: Not on file   Tobacco Use    Smoking status: Never Smoker    Smokeless tobacco: Never Used   Vaping Use    Vaping Use: Never used   Substance and Sexual Activity    Alcohol use: Not Currently     Comment: maybe a few times a year at most    Drug use: No    Sexual activity: Not Currently   Social History Narrative    Lives in [place] with  of 44 years.   Has one daughter and one son. Retired   at [employer]. Likes to read and play on the computer. Social Determinants of Health     Financial Resource Strain:     Difficulty of Paying Living Expenses:    Food Insecurity:     Worried About Running Out of Food in the Last Year:     [address] in the Last Year:    Transportation Needs:     Lack of Transportation (Medical):  Lack of Transportation (Non-Medical):    Physical Activity:     Days of Exercise per Week:     Minutes of Exercise per Session:    Stress:     Feeling of Stress :    Social Connections:     Frequency of Communication with Friends and Family:     Frequency of Social Gatherings with Friends and Family:     Attends [employer]ian[employer] Services:     Active Member of Clubs or Organizations:     Attends Club or Organization Meetings:     Marital Status:      Family History   Problem Relation Age of Onset    Diabetes Father     Stroke Father     Cancer Mother         lung    Other Mother         aortic aneurysm       OBJECTIVE:     Visit Vitals  BP (!) 150/82 (BP 1 Location: Left upper arm, BP Patient Position: Sitting, BP Cuff Size: Large adult)   Pulse 78   Temp 97.5 °F (36.4 °C)   Resp 16   Ht 5' 2\" (1.575 m)   Wt 182 lb 6.4 oz (82.7 kg)   LMP  (LMP Unknown)   SpO2 97%   BMI 33.36 kg/m²       Constitutional: She appears well nourished, of stated age, and dressed appropriately. Eyes: Sclera anicteric, PERRLA, EOMI  Neck: Supple without lymphadenopathy. Thyroid normal, No JVD or bruits  Respiratory: Clear to ascultation X5, normal inspiratory effort, no adventitious breath sounds. Cardiovascular: Regular rate and rhythm, no rubs or gallops, PMI not displaced, No thrills, no peripheral edema  Hematologic: No purpura, petechiae or unexplained bruising  Lymphatic: No lymph node enlargemant. Integumentary: Dyshidrotic areas to palmar surfaces of hands and plantar surfaces of feet.     Peripheral Vascular: Normal pulses palpable to PT/DP. Neuro: Non-focal exam, A & O X 3.   Psychiatric: Appropriate affect and demeanor, pleasant and cooperative. Patient's thought content and thought processing appear to be within normal limits. ASSESSMENT/PLAN:     ICD-10-CM ICD-9-CM    1. Type 2 diabetes mellitus without complication, without long-term current use of insulin ([facility])  E11.9 250.00 HEMOGLOBIN A1C WITH EAG      HEMOGLOBIN A1C WITH EAG   2. Mixed hyperlipidemia due to type 2 diabetes mellitus (HCC)  E11.69 250.80 fenofibrate (LOFIBRA) 160 mg tablet    E78.2 272.2    3. Type 2 diabetes with nephropathy ([facility])  E11.21 250.40      583.81    4. Hyperlipidemia LDL goal <70  E78.5 272.4 LIPID PANEL      LIPID PANEL   5. Essential hypertension  H73 789.3 METABOLIC PANEL, COMPREHENSIVE      amLODIPine (NORVASC) 2.5 mg tablet      METABOLIC PANEL, COMPREHENSIVE   6. Anemia, unspecified type  D64.9 285.9 CBC W/O DIFF      CBC W/O DIFF   7. On statin therapy  P53.448 T87.45 METABOLIC PANEL, COMPREHENSIVE      LIPID PANEL      LIPID PANEL      METABOLIC PANEL, COMPREHENSIVE     1: We will repeat labs today including: Lipid panel, CMP, CBC, and hemoglobin A1c.  2: Patient counseled on compliance with Metformin. Please take evening dose as discussed. 3: Blood pressure poorly controlled at this time. I will add amlodipine 2.5 mg to patient's daily regimen. 4: Patient to discontinue Vascepa due to cost.  Start fenofibrate 160 mg 1 tablet each day. Continue rosuvastatin 20 mg daily. 5: Patient to monitor blood sugars and blood pressures on a regular basis. 6: Patient to continue healthy lifestyle management including: Low-fat/low-cholesterol diet, avoidance of concentrated sweets, low-sodium diet, adequate amounts of fiber water, and exercise as tolerated. 7: Patient to follow-up with me in approximately 3 months, or sooner as needed. Patient states understanding and agrees with plan.       Orders Placed This Encounter    silicones/castor oil (HAND PROTECTIVE EX)    tazarotene (Arazlo) 0.045 % lotn         ATTENTION:   This medical record was transcribed using an electronic medical records system. Although proofread, it may and can contain electronic and spelling errors. Other human spelling and other errors may be present. Corrections may be executed at a later time. Please feel free to contact us for any clarifications as needed. Signed,  Thais Maier.  Iveth Harding, MSN APRN FNP-BC

## 2021-10-27 NOTE — PROGRESS NOTES
Ivana Abad is a 70 y.o. female    Chief Complaint   Patient presents with    Diabetes     3 month follow up/Dr. Sofía Menjivar       Visit Vitals  BP (!) 150/82 (BP 1 Location: Left upper arm, BP Patient Position: Sitting, BP Cuff Size: Large adult)   Pulse 78   Temp 97.5 °F (36.4 °C)   Resp 16   Ht 5' 2\" (1.575 m)   Wt 182 lb 6.4 oz (82.7 kg)   LMP  (LMP Unknown)   SpO2 97%   BMI 33.36 kg/m²           1. Have you been to the ER, urgent care clinic since your last visit? Hospitalized since your last visit? NO    2. Have you seen or consulted any other health care providers outside of the [address] [place] [address] since your last visit? Include any pap smears or colon screening.  NO

## 2021-10-28 LAB
ALBUMIN SERPL-MCNC: 4 G/DL (ref 3.5–5)
ALBUMIN/GLOB SERPL: 1.2 {RATIO} (ref 1.1–2.2)
ALP SERPL-CCNC: 112 U/L (ref 45–117)
ALT SERPL-CCNC: 18 U/L (ref 12–78)
ANION GAP SERPL CALC-SCNC: 5 MMOL/L (ref 5–15)
AST SERPL-CCNC: 13 U/L (ref 15–37)
BILIRUB SERPL-MCNC: 0.4 MG/DL (ref 0.2–1)
BUN SERPL-MCNC: 19 MG/DL (ref 6–20)
BUN/CREAT SERPL: 17 (ref 12–20)
CALCIUM SERPL-MCNC: 10 MG/DL (ref 8.5–10.1)
CHLORIDE SERPL-SCNC: 107 MMOL/L (ref 97–108)
CHOLEST SERPL-MCNC: 148 MG/DL
CO2 SERPL-SCNC: 29 MMOL/L (ref 21–32)
CREAT SERPL-MCNC: 1.13 MG/DL (ref 0.55–1.02)
ERYTHROCYTE [DISTWIDTH] IN BLOOD BY AUTOMATED COUNT: 13.4 % (ref 11.5–14.5)
EST. AVERAGE GLUCOSE BLD GHB EST-MCNC: 171 MG/DL
GLOBULIN SER CALC-MCNC: 3.3 G/DL (ref 2–4)
GLUCOSE SERPL-MCNC: 188 MG/DL (ref 65–100)
HBA1C MFR BLD: 7.6 % (ref 4–5.6)
HCT VFR BLD AUTO: 36 % (ref 35–47)
HDLC SERPL-MCNC: 32 MG/DL
HDLC SERPL: 4.6 {RATIO} (ref 0–5)
HGB BLD-MCNC: 11.9 G/DL (ref 11.5–16)
LDLC SERPL CALC-MCNC: 45.4 MG/DL (ref 0–100)
MCH RBC QN AUTO: 29.9 PG (ref 26–34)
MCHC RBC AUTO-ENTMCNC: 33.1 G/DL (ref 30–36.5)
MCV RBC AUTO: 90.5 FL (ref 80–99)
NRBC # BLD: 0 K/UL (ref 0–0.01)
NRBC BLD-RTO: 0 PER 100 WBC
PLATELET # BLD AUTO: 175 K/UL (ref 150–400)
PMV BLD AUTO: 11.1 FL (ref 8.9–12.9)
POTASSIUM SERPL-SCNC: 5 MMOL/L (ref 3.5–5.1)
PROT SERPL-MCNC: 7.3 G/DL (ref 6.4–8.2)
RBC # BLD AUTO: 3.98 M/UL (ref 3.8–5.2)
SODIUM SERPL-SCNC: 141 MMOL/L (ref 136–145)
TRIGL SERPL-MCNC: 353 MG/DL (ref ?–150)
VLDLC SERPL CALC-MCNC: 70.6 MG/DL
WBC # BLD AUTO: 7.5 K/UL (ref 3.6–11)

## 2021-10-29 NOTE — PROGRESS NOTES
Total cholesterol and LDL are in good range. Triglyceride levels remain elevated. Start fenofibrate 160 mg daily as prescribed. Hemoglobin A1c is currently at 7.6. Our goal is to get you below 7%. Please continue to take medication as prescribed. We will continue to follow this. Blood count shows no signs of anemia. Good. Repeat labs in 3 months.

## 2021-11-09 RX ORDER — LEVOTHYROXINE SODIUM 88 UG/1
TABLET ORAL
Qty: 90 TABLET | Refills: 1 | Status: SHIPPED | OUTPATIENT
Start: 2021-11-09 | End: 2022-05-05

## 2021-11-09 NOTE — TELEPHONE ENCOUNTER
RX refill request from the patient/pharmacy. Patient last seen 10- with labs, and next appt. scheduled for 02-  Requested Prescriptions     Pending Prescriptions Disp Refills    levothyroxine (SYNTHROID) 88 mcg tablet 90 Tablet 1     Sig: TAKE 1 TABLET BY MOUTH ONCE DAILY BEFORE BREAKFAST   .

## 2022-01-18 DIAGNOSIS — E11.9 TYPE 2 DIABETES MELLITUS WITHOUT COMPLICATION, WITHOUT LONG-TERM CURRENT USE OF INSULIN (HCC): ICD-10-CM

## 2022-01-18 RX ORDER — METFORMIN HYDROCHLORIDE 500 MG/1
500 TABLET, EXTENDED RELEASE ORAL 2 TIMES DAILY WITH MEALS
Qty: 180 TABLET | Refills: 1 | Status: SHIPPED | OUTPATIENT
Start: 2022-01-18 | End: 2022-07-18

## 2022-01-18 NOTE — TELEPHONE ENCOUNTER
Last Refill: 1/12/21  Last Visit: 10/27/2021   Next Visit: 2/3/2022    Requested Prescriptions     Pending Prescriptions Disp Refills    metFORMIN ER (GLUCOPHAGE XR) 500 mg tablet 180 Tablet 3     Sig: Take 1 Tablet by mouth two (2) times daily (with meals). 98.9

## 2022-01-20 ENCOUNTER — TELEPHONE (OUTPATIENT)
Dept: INTERNAL MEDICINE CLINIC | Age: 72
End: 2022-01-20

## 2022-01-20 DIAGNOSIS — L30.9 ECZEMA, UNSPECIFIED TYPE: Primary | ICD-10-CM

## 2022-01-20 RX ORDER — CLOBETASOL PROPIONATE 0.5 MG/G
CREAM TOPICAL 2 TIMES DAILY
Qty: 30 G | Refills: 2 | Status: SHIPPED | OUTPATIENT
Start: 2022-01-20

## 2022-01-20 NOTE — TELEPHONE ENCOUNTER
Discussed with pt. Pt would like her own clobetasol cream prescription for her eczema.     And for her  to have a refill for his clobetasol cream.    I will send refill request.

## 2022-01-20 NOTE — TELEPHONE ENCOUNTER
Pt called and stated that her  Janak Plasencia has been prescribed Baclofen and takes it as prescribed-once at night. Pt wanted to know if her  can also take baclofen once during the afternoon as well due to the uncomfortable outbursts of the muscle spasms happening more frequently. And pt wanted to know if she can get a refill for Silver Sulfadiazine Cream for her eczema and her 's dry itchy spots.

## 2022-02-03 ENCOUNTER — OFFICE VISIT (OUTPATIENT)
Dept: INTERNAL MEDICINE CLINIC | Age: 72
End: 2022-02-03
Payer: MEDICARE

## 2022-02-03 VITALS
WEIGHT: 180.6 LBS | OXYGEN SATURATION: 97 % | TEMPERATURE: 98.5 F | HEART RATE: 60 BPM | HEIGHT: 62 IN | DIASTOLIC BLOOD PRESSURE: 70 MMHG | RESPIRATION RATE: 16 BRPM | SYSTOLIC BLOOD PRESSURE: 138 MMHG | BODY MASS INDEX: 33.23 KG/M2

## 2022-02-03 DIAGNOSIS — E66.09 CLASS 1 OBESITY DUE TO EXCESS CALORIES WITH SERIOUS COMORBIDITY AND BODY MASS INDEX (BMI) OF 33.0 TO 33.9 IN ADULT: ICD-10-CM

## 2022-02-03 DIAGNOSIS — E03.9 ACQUIRED HYPOTHYROIDISM: ICD-10-CM

## 2022-02-03 DIAGNOSIS — Z79.899 ON STATIN THERAPY: ICD-10-CM

## 2022-02-03 DIAGNOSIS — E78.2 MIXED DIABETIC HYPERLIPIDEMIA ASSOCIATED WITH TYPE 2 DIABETES MELLITUS (HCC): ICD-10-CM

## 2022-02-03 DIAGNOSIS — E11.22 TYPE 2 DIABETES MELLITUS WITH STAGE 3A CHRONIC KIDNEY DISEASE, WITHOUT LONG-TERM CURRENT USE OF INSULIN (HCC): Primary | ICD-10-CM

## 2022-02-03 DIAGNOSIS — E11.69 MIXED DIABETIC HYPERLIPIDEMIA ASSOCIATED WITH TYPE 2 DIABETES MELLITUS (HCC): ICD-10-CM

## 2022-02-03 DIAGNOSIS — I10 ESSENTIAL HYPERTENSION: ICD-10-CM

## 2022-02-03 DIAGNOSIS — N18.31 TYPE 2 DIABETES MELLITUS WITH STAGE 3A CHRONIC KIDNEY DISEASE, WITHOUT LONG-TERM CURRENT USE OF INSULIN (HCC): Primary | ICD-10-CM

## 2022-02-03 PROBLEM — N18.4 ANEMIA IN STAGE 4 CHRONIC KIDNEY DISEASE (HCC): Status: RESOLVED | Noted: 2017-10-18 | Resolved: 2022-02-03

## 2022-02-03 PROBLEM — N18.4 ANEMIA IN STAGE 4 CHRONIC KIDNEY DISEASE (HCC): Status: ACTIVE | Noted: 2017-10-18

## 2022-02-03 PROBLEM — D63.1 ANEMIA IN STAGE 4 CHRONIC KIDNEY DISEASE (HCC): Status: RESOLVED | Noted: 2017-10-18 | Resolved: 2022-02-03

## 2022-02-03 PROCEDURE — G8427 DOCREV CUR MEDS BY ELIG CLIN: HCPCS | Performed by: NURSE PRACTITIONER

## 2022-02-03 PROCEDURE — G8752 SYS BP LESS 140: HCPCS | Performed by: NURSE PRACTITIONER

## 2022-02-03 PROCEDURE — G8536 NO DOC ELDER MAL SCRN: HCPCS | Performed by: NURSE PRACTITIONER

## 2022-02-03 PROCEDURE — 1101F PT FALLS ASSESS-DOCD LE1/YR: CPT | Performed by: NURSE PRACTITIONER

## 2022-02-03 PROCEDURE — 1090F PRES/ABSN URINE INCON ASSESS: CPT | Performed by: NURSE PRACTITIONER

## 2022-02-03 PROCEDURE — G9899 SCRN MAM PERF RSLTS DOC: HCPCS | Performed by: NURSE PRACTITIONER

## 2022-02-03 PROCEDURE — 3017F COLORECTAL CA SCREEN DOC REV: CPT | Performed by: NURSE PRACTITIONER

## 2022-02-03 PROCEDURE — G8754 DIAS BP LESS 90: HCPCS | Performed by: NURSE PRACTITIONER

## 2022-02-03 PROCEDURE — 99214 OFFICE O/P EST MOD 30 MIN: CPT | Performed by: NURSE PRACTITIONER

## 2022-02-03 PROCEDURE — 3046F HEMOGLOBIN A1C LEVEL >9.0%: CPT | Performed by: NURSE PRACTITIONER

## 2022-02-03 PROCEDURE — 2022F DILAT RTA XM EVC RTNOPTHY: CPT | Performed by: NURSE PRACTITIONER

## 2022-02-03 PROCEDURE — G8417 CALC BMI ABV UP PARAM F/U: HCPCS | Performed by: NURSE PRACTITIONER

## 2022-02-03 PROCEDURE — G8432 DEP SCR NOT DOC, RNG: HCPCS | Performed by: NURSE PRACTITIONER

## 2022-02-03 NOTE — PROGRESS NOTES
Treasure Najera is a 70 y.o. female    Chief Complaint   Patient presents with    Labs     6 month follow up       Visit Vitals  BP (!) 146/94 (BP 1 Location: Left upper arm, BP Patient Position: Sitting, BP Cuff Size: Large adult)   Pulse 60   Temp 98.5 °F (36.9 °C)   Resp 16   Ht 5' 2\" (1.575 m)   Wt 180 lb 9.6 oz (81.9 kg)   LMP  (LMP Unknown)   SpO2 97%   BMI 33.03 kg/m²           1. Have you been to the ER, urgent care clinic since your last visit? Hospitalized since your last visit? NO    2. Have you seen or consulted any other health care providers outside of the [address] [place] [address] since your last visit? Include any pap smears or colon screening.  NO

## 2022-02-03 NOTE — PROGRESS NOTES
Chief Complaint   Patient presents with    Labs     6 month follow up       SUBJECTIVE:    Sylvia Parham is a 70 y.o. female who is here today for a follow up appointment regarding her type 2 diabetes with nephropathy and mixed lipidemia, hypertension, hypothyroidism, and obesity. She states she is feeling fairly well overall, and denies any new or acute complaints at this time. She is taking her medications as prescribed, but admits that she has \"difficulty remembering to take the evening dose of Metformin. \"  She is fasting today. The patient's last hemoglobin A1c was approximately 7.6%. There was no modification of her medication at that time, but suggestion to be more compliant with use and avoid concentrated sweets. She admits that she has not always been as compliant with her diet as she should be. She is not exercising purposefully on a regular basis, but occasionally walks when she has to do shopping or other similar activities. Her blood pressure appears to be fairly well controlled overall. She denies any adverse side effects of her medication. In addition, she denies any chest pain, chest pressure, shortness of breath, headaches, dizziness, blurred vision, palpitations, or syncope episodes. She is taking her levothyroxine as prescribed. She denies any excessive hot/cold intolerance. Current Outpatient Medications   Medication Sig Dispense Refill    clobetasoL (TEMOVATE) 0.05 % topical cream Apply  to affected area two (2) times a day. 30 g 2    metFORMIN ER (GLUCOPHAGE XR) 500 mg tablet Take 1 Tablet by mouth two (2) times daily (with meals). 180 Tablet 1    gabapentin (NEURONTIN) 300 mg capsule Take 1 Capsule by mouth daily. Max Daily Amount: 300 mg. 90 Capsule 1    levothyroxine (SYNTHROID) 88 mcg tablet TAKE 1 TABLET BY MOUTH ONCE DAILY BEFORE BREAKFAST 90 Tablet 1    silicones/castor oil (HAND PROTECTIVE EX) by Apply Externally route.  Hand gel Keratoderma bare 40 gel cream      tazarotene (Arazlo) 0.045 % lotn by Apply Externally route.  fenofibrate (LOFIBRA) 160 mg tablet Take 1 Tablet by mouth daily. Indications: high cholesterol and high triglycerides 90 Tablet 1    amLODIPine (NORVASC) 2.5 mg tablet Take 1 Tablet by mouth daily. Indications: high blood pressure 90 Tablet 1    True Metrix Glucose Test Strip strip USE TO CHECK BLOOD SUGAR ONCE DAILY 100 Strip 0    losartan (COZAAR) 100 mg tablet TAKE 1 TABLET DAILY 90 Tablet 3    Blood-Glucose Meter (Relion Confirm) monitoring kit Please use to check BG  fasting blood sugar 1 time in a day 1 Kit 0    calcium citrate-vitamin D3 (CITRACAL + D) tablet Take  by mouth two (2) times a day.  B.infantis-B.ani-B.long-B.bifi (PROBIOTIC 4X) 10-15 mg TbEC Take  by mouth.  MULTIVITAMIN/IRON/FOLIC ACID (CENTRUM COMPLETE PO) Take  by mouth daily. Fit for me multivitamin      fluticasone (FLONASE) 50 mcg/actuation nasal spray nightly.  CETIRIZINE HCL (ZYRTEC PO) Take 10 mg by mouth daily.        Past Medical History:   Diagnosis Date    Abnormal kidney function 10/18/2017    Abnormal presence of protein in urine 10/18/2017    Acute serous otitis media of left ear 10/18/2017    Comments: recurrence not specified    Allergic rhinitis     Anemia     Anemia in chronic kidney disease 10/18/2017    Annual physical exam 10/18/2017    Back pain     Basal cell carcinoma     of tip of nose    Boil of trunk 10/18/2017    Bronchitis 10/18/2017    Carcinoma ([address] 10/18/2017    Comments: basal cell nose s/p excision    Chest pain, exertional 10/18/2017    Cough 10/18/2017    Dry mouth     Eczema 10/18/2017    Comments: intrinsic     History of cholecystectomy 10/18/2017    Comments: open 1989    HTN (hypertension) 10/18/2017    Kidney stone     LUIS FERNANDO (obstructive sleep apnea)     Osteopenia     Osteoporosis 10/18/2017    Other and unspecified hyperlipidemia     Pigmentary retinopathy     Type II or unspecified type diabetes mellitus without mention of complication, uncontrolled     Unspecified essential hypertension     Unspecified hypothyroidism     Unspecified vitamin D deficiency      Past Surgical History:   Procedure Laterality Date    COLONOSCOPY N/A 4/10/2018    COLONOSCOPY performed by Sudha Solitario MD at [facility]"" ENDOSCOPY    DELIVERY       x2    HX CHOLECYSTECTOMY      HX OTHER SURGICAL  3/10/16    gastric sleeve    HX POLYPECTOMY      HX TONSILLECTOMY       Allergies   Allergen Reactions    Niacin Other (comments)     Skin irritation    Sulfa (Sulfonamide Antibiotics) Unknown (comments)    Tricor [Fenofibrate Micronized] Swelling       REVIEW OF SYSTEMS:                                        POSITIVE= bold text  Negative = regular text    General:                     fever, chills, sweats, generalized weakness, weight loss/gain,                                       loss of appetite   Eyes:                           blurred vision, eye pain, loss of vision, double vision  ENT:                            rhinorrhea, pharyngitis   Respiratory:               cough, sputum production, SOB, WOLFF, wheezing, pleuritic pain   Cardiology:                chest pain, palpitations, orthopnea, PND, edema, syncope   Gastrointestinal:       abdominal pain , N/V, diarrhea, dysphagia, constipation, bleeding   Genitourinary:           frequency, urgency, dysuria, hematuria, incontinence   Muskuloskeletal :      arthralgia, myalgia, back pain  Hematology:              easy bruising, nose or gum bleeding, lymphadenopathy   Dermatological:         rash, ulceration, pruritis, color change / jaundice  Endocrine:                 hot flashes or polydipsia   Neurological:             headache, dizziness, confusion, focal weakness, paresthesia,                                      Speech difficulties, memory loss, gait difficulty  Psychological:          Feelings of anxiety, depression, agitation        Social History     Socioeconomic History    Marital status:    Tobacco Use    Smoking status: Never Smoker    Smokeless tobacco: Never Used   Vaping Use    Vaping Use: Never used   Substance and Sexual Activity    Alcohol use: Not Currently     Comment: maybe a few times a year at most    Drug use: No    Sexual activity: Not Currently   Social History Narrative    Lives in [place] with  of 44 years. Has one daughter and one son. Retired   at [employer]. Likes to read and play on the computer. Family History   Problem Relation Age of Onset    Diabetes Father     Stroke Father     Cancer Mother         lung    Other Mother         aortic aneurysm       OBJECTIVE:     Visit Vitals  BP (!) 146/94 (BP 1 Location: Left upper arm, BP Patient Position: Sitting, BP Cuff Size: Large adult)   Pulse 60   Temp 98.5 °F (36.9 °C)   Resp 16   Ht 5' 2\" (1.575 m)   Wt 180 lb 9.6 oz (81.9 kg)   LMP  (LMP Unknown)   SpO2 97%   BMI 33.03 kg/m²       Constitutional: She appears well nourished, of stated age, and dressed appropriately. Eyes: Sclera anicteric, PERRLA, EOMI  Neck: Supple without lymphadenopathy. Thyroid normal, No JVD or bruits  Respiratory: Clear to ascultation X5, normal inspiratory effort, no adventitious breath sounds. Cardiovascular: Regular rate and rhythm, mild systolic murmur heard loudest at aortic landmark, but without rubs or gallops, PMI not displaced, No thrills. Neuro: Non-focal exam, A & O X 3.   Psychiatric: Appropriate affect and demeanor, pleasant and cooperative. Patient's thought content and thought processing appear to be within normal limits. ASSESSMENT/PLAN:     ICD-10-CM ICD-9-CM    1.  Type 2 diabetes mellitus with stage 3a chronic kidney disease, without long-term current use of insulin ([facility])  G51.25 926.94 METABOLIC PANEL, COMPREHENSIVE    N18.31 585.3 HEMOGLOBIN A1C WITH EAG MICROALBUMIN, UR, RAND W/ MICROALB/CREAT RATIO      HEMOGLOBIN A1C WITH EAG      METABOLIC PANEL, COMPREHENSIVE   2. Mixed diabetic hyperlipidemia associated with type 2 diabetes mellitus (HCC)  E11.69 250.80 LIPID PANEL    E78.2 272.2 LIPID PANEL   3. Essential hypertension  I10 401.9 CBC W/O DIFF      METABOLIC PANEL, COMPREHENSIVE      METABOLIC PANEL, COMPREHENSIVE      CBC W/O DIFF   4. Acquired hypothyroidism  E03.9 244.9 TSH 3RD GENERATION      T4, FREE      T4, FREE      TSH 3RD GENERATION   5. Class 1 obesity due to excess calories with serious comorbidity and body mass index (BMI) of 33.0 to 33.9 in adult  E66.09 278.00     Z68.33 V85.33    6. On statin therapy  Z79.899 V58.69      1: We will repeat labs today including: CBC, CMP, lipid panel, TSH, free T4, hemoglobin A1c, and urine microalbumin. 2: Patient counseled on appropriate use of Metformin. Please work on taking evening dose as prescribed. 3: Patient to continue healthy lifestyle management including: Low-fat/low-cholesterol diet, adequate amounts of fiber water, avoidance of concentrated sweets, and improved regular exercise patterns. Weight loss necessary. 4: Patient to continue current medications for management of diabetes, hypertension, and hypothyroidism. 5: Patient to follow-up with me in approximately 3 months, or sooner as needed. Patient states understanding and agrees with plan. Orders Placed This Encounter    CBC W/O DIFF    METABOLIC PANEL, COMPREHENSIVE    HEMOGLOBIN A1C WITH EAG    LIPID PANEL    TSH 3RD GENERATION    T4, FREE    MICROALBUMIN, UR, RAND W/ MICROALB/CREAT RATIO         ATTENTION:   This medical record was transcribed using an electronic medical records system. Although proofread, it may and can contain electronic and spelling errors. Other human spelling and other errors may be present. Corrections may be executed at a later time.   Please feel free to contact us for any clarifications as needed. Follow-up and Dispositions    · Return in about 3 months (around 5/3/2022) for Follow-up with fasting lab. Signed,  Sheila Amaya.  Nehal Kruger, MSN APRN FNP-BC

## 2022-02-04 LAB
ALBUMIN SERPL-MCNC: 4.1 G/DL (ref 3.5–5)
ALBUMIN/GLOB SERPL: 1.4 {RATIO} (ref 1.1–2.2)
ALP SERPL-CCNC: 62 U/L (ref 45–117)
ALT SERPL-CCNC: 34 U/L (ref 12–78)
ANION GAP SERPL CALC-SCNC: 3 MMOL/L (ref 5–15)
AST SERPL-CCNC: 35 U/L (ref 15–37)
BILIRUB SERPL-MCNC: 0.4 MG/DL (ref 0.2–1)
BUN SERPL-MCNC: 19 MG/DL (ref 6–20)
BUN/CREAT SERPL: 16 (ref 12–20)
CALCIUM SERPL-MCNC: 10.2 MG/DL (ref 8.5–10.1)
CHLORIDE SERPL-SCNC: 109 MMOL/L (ref 97–108)
CHOLEST SERPL-MCNC: 140 MG/DL
CO2 SERPL-SCNC: 30 MMOL/L (ref 21–32)
COMMENT, HOLDF: NORMAL
CREAT SERPL-MCNC: 1.17 MG/DL (ref 0.55–1.02)
CREAT UR-MCNC: 140 MG/DL
ERYTHROCYTE [DISTWIDTH] IN BLOOD BY AUTOMATED COUNT: 13.5 % (ref 11.5–14.5)
EST. AVERAGE GLUCOSE BLD GHB EST-MCNC: 180 MG/DL
GLOBULIN SER CALC-MCNC: 2.9 G/DL (ref 2–4)
GLUCOSE SERPL-MCNC: 145 MG/DL (ref 65–100)
HBA1C MFR BLD: 7.9 % (ref 4–5.6)
HCT VFR BLD AUTO: 35.1 % (ref 35–47)
HDLC SERPL-MCNC: 37 MG/DL
HDLC SERPL: 3.8 {RATIO} (ref 0–5)
HGB BLD-MCNC: 11.2 G/DL (ref 11.5–16)
LDLC SERPL CALC-MCNC: 57.8 MG/DL (ref 0–100)
MCH RBC QN AUTO: 29.8 PG (ref 26–34)
MCHC RBC AUTO-ENTMCNC: 31.9 G/DL (ref 30–36.5)
MCV RBC AUTO: 93.4 FL (ref 80–99)
MICROALBUMIN UR-MCNC: 5.33 MG/DL
MICROALBUMIN/CREAT UR-RTO: 38 MG/G (ref 0–30)
NRBC # BLD: 0 K/UL (ref 0–0.01)
NRBC BLD-RTO: 0 PER 100 WBC
PLATELET # BLD AUTO: 229 K/UL (ref 150–400)
PMV BLD AUTO: 10.5 FL (ref 8.9–12.9)
POTASSIUM SERPL-SCNC: 5.1 MMOL/L (ref 3.5–5.1)
PROT SERPL-MCNC: 7 G/DL (ref 6.4–8.2)
RBC # BLD AUTO: 3.76 M/UL (ref 3.8–5.2)
SAMPLES BEING HELD,HOLD: NORMAL
SODIUM SERPL-SCNC: 142 MMOL/L (ref 136–145)
T4 FREE SERPL-MCNC: 1.3 NG/DL (ref 0.8–1.5)
TRIGL SERPL-MCNC: 226 MG/DL (ref ?–150)
TSH SERPL DL<=0.05 MIU/L-ACNC: 0.46 UIU/ML (ref 0.36–3.74)
VLDLC SERPL CALC-MCNC: 45.2 MG/DL
WBC # BLD AUTO: 5.1 K/UL (ref 3.6–11)

## 2022-02-04 NOTE — PROGRESS NOTES
Urine microalbumin shows continued leakage of protein through kidneys, but somewhat improved since a year ago. We will keep an eye on this. Thyroid shows to be functioning normally. Cholesterol levels are in acceptable range. Hemoglobin A1c is still above normal at 7.9%. This is up from 7.6% 3 months ago. Kidney functions show stable underfunctioning. Electrolytes are okay. Liver functions are okay. Blood count is stable. Must work on avoidance of concentrated sweets and excess carbohydrates in diet. Please take Metformin twice daily as prescribed. Recheck labs in 3 to 6 months.

## 2022-03-18 PROBLEM — I10 HYPERTENSIVE DISORDER: Status: ACTIVE | Noted: 2017-10-18

## 2022-03-18 PROBLEM — E11.21 TYPE 2 DIABETES WITH NEPHROPATHY (HCC): Status: ACTIVE | Noted: 2019-12-30

## 2022-03-18 PROBLEM — R80.9 ABNORMAL PRESENCE OF PROTEIN IN URINE: Status: ACTIVE | Noted: 2017-10-18

## 2022-03-18 PROBLEM — M81.0 OSTEOPOROSIS: Status: ACTIVE | Noted: 2017-10-18

## 2022-03-18 PROBLEM — L30.9 ECZEMA: Status: ACTIVE | Noted: 2017-10-18

## 2022-03-18 PROBLEM — R93.1 ABNORMAL HEART SCORE CT: Status: ACTIVE | Noted: 2018-10-19

## 2022-03-19 PROBLEM — E78.1 HYPERTRIGLYCERIDEMIA: Status: ACTIVE | Noted: 2020-09-08

## 2022-03-19 PROBLEM — I25.84 CORONARY ARTERY CALCIFICATION: Status: ACTIVE | Noted: 2018-11-16

## 2022-03-19 PROBLEM — N28.9 ABNORMAL KIDNEY FUNCTION: Status: ACTIVE | Noted: 2017-10-18

## 2022-03-19 PROBLEM — Z00.00 ANNUAL PHYSICAL EXAM: Status: ACTIVE | Noted: 2017-10-18

## 2022-03-19 PROBLEM — D75.89 MACROCYTOSIS: Status: ACTIVE | Noted: 2019-05-07

## 2022-03-19 PROBLEM — E03.9 HYPOTHYROIDISM: Status: ACTIVE | Noted: 2020-03-11

## 2022-03-19 PROBLEM — I25.10 CORONARY ARTERY CALCIFICATION: Status: ACTIVE | Noted: 2018-11-16

## 2022-03-19 PROBLEM — R63.4 WEIGHT LOSS OF MORE THAN 10% BODY WEIGHT: Status: ACTIVE | Noted: 2019-06-19

## 2022-03-19 PROBLEM — N18.30 CKD (CHRONIC KIDNEY DISEASE) STAGE 3, GFR 30-59 ML/MIN (HCC): Status: ACTIVE | Noted: 2019-12-30

## 2022-03-19 PROBLEM — Z79.899 ON STATIN THERAPY: Status: ACTIVE | Noted: 2019-04-17

## 2022-03-20 PROBLEM — I25.10 ASHD (ARTERIOSCLEROTIC HEART DISEASE): Status: ACTIVE | Noted: 2018-10-19

## 2022-03-20 PROBLEM — Z98.84 S/P GASTRIC BYPASS: Status: ACTIVE | Noted: 2019-04-18

## 2022-03-20 PROBLEM — E78.2 MIXED DIABETIC HYPERLIPIDEMIA ASSOCIATED WITH TYPE 2 DIABETES MELLITUS (HCC): Status: ACTIVE | Noted: 2021-10-27

## 2022-03-20 PROBLEM — Z90.49 HISTORY OF CHOLECYSTECTOMY: Status: ACTIVE | Noted: 2017-10-18

## 2022-03-20 PROBLEM — E11.69 MIXED DIABETIC HYPERLIPIDEMIA ASSOCIATED WITH TYPE 2 DIABETES MELLITUS (HCC): Status: ACTIVE | Noted: 2021-10-27

## 2022-03-20 PROBLEM — E11.40 TYPE 2 DIABETES MELLITUS WITH DIABETIC NEUROPATHY (HCC): Status: ACTIVE | Noted: 2019-12-30

## 2022-03-21 ENCOUNTER — HOSPITAL ENCOUNTER (OUTPATIENT)
Dept: MAMMOGRAPHY | Age: 72
Discharge: HOME | End: 2022-03-21
Attending: INTERNAL MEDICINE
Payer: MEDICARE

## 2022-03-21 DIAGNOSIS — Z12.31 OTHER SCREENING MAMMOGRAM: ICD-10-CM

## 2022-03-21 PROCEDURE — 77067 SCR MAMMO BI INCL CAD: CPT

## 2022-04-22 DIAGNOSIS — I10 ESSENTIAL HYPERTENSION: ICD-10-CM

## 2022-04-22 DIAGNOSIS — E78.2 MIXED HYPERLIPIDEMIA DUE TO TYPE 2 DIABETES MELLITUS (HCC): ICD-10-CM

## 2022-04-22 DIAGNOSIS — E11.69 MIXED HYPERLIPIDEMIA DUE TO TYPE 2 DIABETES MELLITUS (HCC): ICD-10-CM

## 2022-04-22 RX ORDER — AMLODIPINE BESYLATE 2.5 MG/1
TABLET ORAL
Qty: 90 TABLET | Refills: 1 | Status: SHIPPED | OUTPATIENT
Start: 2022-04-22 | End: 2022-06-07 | Stop reason: SDUPTHER

## 2022-04-22 RX ORDER — FENOFIBRATE 160 MG/1
TABLET ORAL
Qty: 90 TABLET | Refills: 1 | Status: SHIPPED | OUTPATIENT
Start: 2022-04-22 | End: 2022-10-21

## 2022-05-04 ENCOUNTER — OFFICE VISIT (OUTPATIENT)
Dept: INTERNAL MEDICINE CLINIC | Age: 72
End: 2022-05-04
Payer: MEDICARE

## 2022-05-04 VITALS
BODY MASS INDEX: 33.09 KG/M2 | TEMPERATURE: 98.1 F | DIASTOLIC BLOOD PRESSURE: 72 MMHG | SYSTOLIC BLOOD PRESSURE: 136 MMHG | HEIGHT: 62 IN | RESPIRATION RATE: 16 BRPM | WEIGHT: 179.8 LBS | OXYGEN SATURATION: 98 % | HEART RATE: 65 BPM

## 2022-05-04 DIAGNOSIS — N18.31 TYPE 2 DIABETES MELLITUS WITH STAGE 3A CHRONIC KIDNEY DISEASE, WITHOUT LONG-TERM CURRENT USE OF INSULIN (HCC): ICD-10-CM

## 2022-05-04 DIAGNOSIS — M53.3 SACROILIAC DYSFUNCTION: ICD-10-CM

## 2022-05-04 DIAGNOSIS — E78.2 MIXED HYPERLIPIDEMIA DUE TO TYPE 2 DIABETES MELLITUS (HCC): ICD-10-CM

## 2022-05-04 DIAGNOSIS — Z79.899 ON STATIN THERAPY: ICD-10-CM

## 2022-05-04 DIAGNOSIS — E03.9 ACQUIRED HYPOTHYROIDISM: ICD-10-CM

## 2022-05-04 DIAGNOSIS — E11.22 TYPE 2 DIABETES MELLITUS WITH STAGE 3A CHRONIC KIDNEY DISEASE, WITHOUT LONG-TERM CURRENT USE OF INSULIN (HCC): ICD-10-CM

## 2022-05-04 DIAGNOSIS — I10 ESSENTIAL HYPERTENSION: ICD-10-CM

## 2022-05-04 DIAGNOSIS — E11.69 MIXED HYPERLIPIDEMIA DUE TO TYPE 2 DIABETES MELLITUS (HCC): ICD-10-CM

## 2022-05-04 DIAGNOSIS — Z71.89 ACP (ADVANCE CARE PLANNING): ICD-10-CM

## 2022-05-04 DIAGNOSIS — Z00.00 ENCOUNTER FOR SUBSEQUENT ANNUAL WELLNESS VISIT (AWV) IN MEDICARE PATIENT: Primary | ICD-10-CM

## 2022-05-04 PROCEDURE — G8427 DOCREV CUR MEDS BY ELIG CLIN: HCPCS | Performed by: NURSE PRACTITIONER

## 2022-05-04 PROCEDURE — G8432 DEP SCR NOT DOC, RNG: HCPCS | Performed by: NURSE PRACTITIONER

## 2022-05-04 PROCEDURE — G8417 CALC BMI ABV UP PARAM F/U: HCPCS | Performed by: NURSE PRACTITIONER

## 2022-05-04 PROCEDURE — G8752 SYS BP LESS 140: HCPCS | Performed by: NURSE PRACTITIONER

## 2022-05-04 PROCEDURE — G0439 PPPS, SUBSEQ VISIT: HCPCS | Performed by: NURSE PRACTITIONER

## 2022-05-04 PROCEDURE — 1101F PT FALLS ASSESS-DOCD LE1/YR: CPT | Performed by: NURSE PRACTITIONER

## 2022-05-04 PROCEDURE — 3017F COLORECTAL CA SCREEN DOC REV: CPT | Performed by: NURSE PRACTITIONER

## 2022-05-04 PROCEDURE — G8536 NO DOC ELDER MAL SCRN: HCPCS | Performed by: NURSE PRACTITIONER

## 2022-05-04 PROCEDURE — 2022F DILAT RTA XM EVC RTNOPTHY: CPT | Performed by: NURSE PRACTITIONER

## 2022-05-04 PROCEDURE — G9899 SCRN MAM PERF RSLTS DOC: HCPCS | Performed by: NURSE PRACTITIONER

## 2022-05-04 PROCEDURE — G8754 DIAS BP LESS 90: HCPCS | Performed by: NURSE PRACTITIONER

## 2022-05-04 PROCEDURE — 3051F HG A1C>EQUAL 7.0%<8.0%: CPT | Performed by: NURSE PRACTITIONER

## 2022-05-04 RX ORDER — GABAPENTIN 300 MG/1
300 CAPSULE ORAL
Qty: 180 CAPSULE | Refills: 1 | Status: SHIPPED | OUTPATIENT
Start: 2022-05-04

## 2022-05-04 NOTE — PROGRESS NOTES
HPI: Ms. Sharif Ibarra is a 70-year-old  female who is here for her annual wellness visit as well as follow-up regarding conditions which include: Type 2 diabetes with stage IIIa chronic renal insufficiency and mixed hyperlipidemia, essential hypertension, hypothyroidism, and ongoing low back pain to left side. She states she is feeling fairly well overall, and denies any new or acute complaints. She continues to take her medications as prescribed. Her blood pressure appears well controlled at this time. She denies any chest pain, chest pressure, shortness of breath, headaches, dizziness, blurred vision, palpitations, or syncope episodes. She is fasting today. Additionally, the patient states she has had a flare of her left lower back pain, and would like to increase her gabapentin to twice a day to take as needed during the daytime. She denies any falls or injury, but states this merely started worsening on its own. She denies any hematuria, black tarry stools or blood in her stools. She states the pain is left of her spine, and radiates down her gluteus. She denies any radiation to her leg or foot. She admits she is not checking her blood sugars on a regular basis, nor her blood pressure. She states she is compliant with her medications though. Annual Wellness Visit    End of Life Planning: This was discussed with her today and she has an advanced directive - a copy HAS NOT been provided. Reviewed DNR/DNI and patient is yes. Depression Screen:  3 most recent PHQ Screens 5/4/2022   Little interest or pleasure in doing things Not at all   Feeling down, depressed, irritable, or hopeless Not at all   Total Score PHQ 2 0         Fall Risk:   Fall Risk Assessment, last 12 mths 5/4/2022   Able to walk? Yes   Fall in past 12 months? 0   Do you feel unsteady?  0   Are you worried about falling 0       Abuse Screen:  Abuse Screening Questionnaire 1/11/2021   Do you ever feel afraid of your partner? N   Are you in a relationship with someone who physically or mentally threatens you? N   Is it safe for you to go home? Y         Alcohol Risk Factor Screening: On any occasion during the past 3 months, have you had more than 3 drinks containing alcohol? No  Do you average more than 7 drinks per week? No    Hearing Loss:  Hearing is good. Activities of Daily Living:  Self-care. Requires assistance with: no ADLs    Adult Nutrition Screen:  No risk factors noted. Health Maintenance  Daily Aspirin: no  Bone Density: up to date  Glaucoma Screening: Yes    Immunizations:                Influenza: up to date. Tetanus: not up to date -recommended. Shingles: up to date. Pneumovax: up to date. COVID-19: Up-to-date. Cancer screening:               Cervical: up to date. Breast: up to date, reviewed SBE. Colon: up to date. Patient Care Team:  Oral Cameron NP as PCP - General (Internal Medicine)  Oral Cameron NP as PCP - [facility] Empaneled Provider  Ryann Alfred MD as Consulting Provider (Endocrinology)         Prior to Admission medications    Medication Sig Start Date End Date Taking? Authorizing Provider   gabapentin (NEURONTIN) 300 mg capsule Take 1 Capsule by mouth two (2) times daily as needed for Pain. Max Daily Amount: 600 mg. 5/4/22  Yes Ankur AGUILAR NP   fenofibrate (LOFIBRA) 160 mg tablet TAKE 1 TABLET BY MOUTH DAILY FOR HIGH CHOLESTEROL 4/22/22  Yes Ankur AGUILAR NP   amLODIPine (NORVASC) 2.5 mg tablet TAKE 1 TABLET BY MOUTH DAILY FOR HIGH BLOOD PRESSURE 4/22/22  Yes Ankur AGUILAR NP   clobetasoL (TEMOVATE) 0.05 % topical cream Apply  to affected area two (2) times a day. 1/20/22  Yes Ankur AGUILAR NP   metFORMIN ER (GLUCOPHAGE XR) 500 mg tablet Take 1 Tablet by mouth two (2) times daily (with meals).  1/18/22  Yes Ankur AGUILAR NP   levothyroxine (SYNTHROID) 88 mcg tablet TAKE 1 TABLET BY MOUTH ONCE DAILY BEFORE BREAKFAST 11/9/21  Yes Oral Cameron NP silicones/castor oil (HAND PROTECTIVE EX) by Apply Externally route. Hand gel Keratoderma bare 40 gel cream   Yes Provider, Historical   tazarotene (Arazlo) 0.045 % lotn by Apply Externally route. Yes Provider, Historical   True Metrix Glucose Test Strip strip USE TO CHECK BLOOD SUGAR ONCE DAILY 9/3/21  Yes Bryce Noguera MD   losartan (COZAAR) 100 mg tablet TAKE 1 TABLET DAILY 7/19/21  Yes Bryce Noguera MD   Blood-Glucose Meter (Relion Confirm) monitoring kit Please use to check BG  fasting blood sugar 1 time in a day 9/9/20  Yes Bryce Noguera MD   calcium citrate-vitamin D3 (CITRACAL + D) tablet Take  by mouth two (2) times a day. Yes Provider, Historical   B.infantis-B.ani-B.long-B.bifi (PROBIOTIC 4X) 10-15 mg TbEC Take  by mouth. Yes Provider, Historical   MULTIVITAMIN/IRON/FOLIC ACID (CENTRUM COMPLETE PO) Take  by mouth daily. Fit for me multivitamin   Yes Provider, Historical   fluticasone (FLONASE) 50 mcg/actuation nasal spray nightly. Yes Provider, Historical   CETIRIZINE HCL (ZYRTEC PO) Take 10 mg by mouth daily. Yes Provider, Historical   gabapentin (NEURONTIN) 300 mg capsule Take 1 Capsule by mouth daily.  Max Daily Amount: 300 mg. 12/14/21 5/4/22  Zahra AGUILAR, NP        Allergies   Allergen Reactions    Niacin Other (comments)     Skin irritation    Sulfa (Sulfonamide Antibiotics) Unknown (comments)    Tricor [Fenofibrate Micronized] Swelling         Past Medical History:   Diagnosis Date    Abnormal kidney function 10/18/2017    Abnormal presence of protein in urine 10/18/2017    Acute serous otitis media of left ear 10/18/2017    Comments: recurrence not specified    Allergic rhinitis     Anemia     Anemia in chronic kidney disease 10/18/2017    Annual physical exam 10/18/2017    Back pain     Basal cell carcinoma     of tip of nose    Boil of trunk 10/18/2017    Bronchitis 10/18/2017    Carcinoma ([address] 10/18/2017    Comments: basal cell nose s/p excision    Chest pain, exertional 10/18/2017    Cough 10/18/2017    Dry mouth     Eczema 10/18/2017    Comments: intrinsic     History of cholecystectomy 10/18/2017    Comments: open 1989    HTN (hypertension) 10/18/2017    Kidney stone     LUIS FERNANDO (obstructive sleep apnea)     Osteopenia     Osteoporosis 10/18/2017    Other and unspecified hyperlipidemia     Pigmentary retinopathy     Type II or unspecified type diabetes mellitus without mention of complication, uncontrolled     Unspecified essential hypertension     Unspecified hypothyroidism     Unspecified vitamin D deficiency        Past Surgical History:   Procedure Laterality Date    COLONOSCOPY N/A 4/10/2018    COLON[facility] ENDOSCOPY    DELIVERY       x2    HX CHOLECYSTECTOMY      HX OTHER SURGICAL  3/10/16    gastric sleeve    HX POLYPECTOMY      HX TONSILLECTOMY         Social History     Socioeconomic History    Marital status:      Spouse name: Not on file    Number of children: Not on file    Years of education: Not on file    Highest education level: Not on file   Occupational History    Not on file   Tobacco Use    Smoking status: Never Smoker    Smokeless tobacco: Never Used   Vaping Use    Vaping Use: Never used   Substance and Sexual Activity    Alcohol use: Not Currently     Comment: maybe a few times a year at most    Drug use: No    Sexual activity: Not Currently   Other Topics Concern    Not on file   Social History Narrative    Lives in [place] with  of 44 years. Has one daughter and one son. Retired   at [employer]. Likes to read and play on the computer.      Social Determinants of Health     Financial Resource Strain:     Difficulty of Paying Living Expenses: Not on file   Food Insecurity:     Worried About Running Out of Food in the Last Year: Not on file    Matt of Food in the Last Year: Not on file Transportation Needs:     Lack of Transportation (Medical): Not on file    Lack of Transportation (Non-Medical):  Not on file   Physical Activity:     Days of Exercise per Week: Not on file    Minutes of Exercise per Session: Not on file   Stress:     Feeling of Stress : Not on file   Social Connections:     Frequency of Communication with Friends and Family: Not on file    Frequency of Social Gatherings with Friends and Family: Not on file    Attends Yazidi Services: Not on file    Active Member of [address] [place] [address] [employer] Services or Organizations: Not on file    Attends Club or Organization Meetings: Not on file    Marital Status: Not on file   Intimate Partner Violence:     Fear of Current or Ex-Partner: Not on file    Emotionally Abused: Not on file    Physically Abused: Not on file    Sexually Abused: Not on file   Housing Stability:     Unable to Pay for Housing in the Last Year: Not on file    Number of [place] in the Last Year: Not on file    Unstable Housing in the Last Year: Not on file       Family History   Problem Relation Age of Onset    Diabetes Father     Stroke Father     Cancer Mother         lung    Other Mother         aortic aneurysm       Patient Active Problem List   Diagnosis Code    Type 2 diabetes mellitus without complication, without long-term current use of insulin ([facility] [address] E11.9    Hyperlipidemia LDL goal <70 E78.5    Hypothyroidism E03.9    Vitamin D deficiency E55.9    Abnormal kidney function N28.9    Abnormal presence of protein in urine R80.9    Annual physical exam Z00.00    Eczema L30.9    History of cholecystectomy Z90.49    Hypertensive disorder I10    Osteoporosis M81.0    ASHD (arteriosclerotic heart disease) I25.10    Abnormal Heart Score CT R93.1    Coronary artery calcification I25.10, I25.84    On statin therapy Z79.899    S/P gastric bypass Z98.84    Macrocytosis D75.89    Weight loss of more than 10% body weight R63.4    Type 2 diabetes with nephropathy ([facility]) E11.21    Type 2 diabetes mellitus with diabetic neuropathy ([facility]) E11.40    CKD (chronic kidney disease) stage 3, GFR 30-59 ml/min ([facility]) N18.30    Hypertriglyceridemia E78.1    Mixed diabetic hyperlipidemia associated with type 2 diabetes mellitus ([facility]) E11.69, E78.2           Review of Systems   Constitutional: Negative. HENT: Negative. Eyes: Negative. Respiratory: Negative. Cardiovascular: Negative. Gastrointestinal: Negative. Genitourinary: Negative. Musculoskeletal: Negative for falls and joint pain. See HPI. Skin: Negative. Neurological: Negative. Endo/Heme/Allergies: Negative. Psychiatric/Behavioral: Negative. Physical Exam  Vitals and nursing note reviewed. Constitutional:       General: She is not in acute distress. Appearance: She is obese. HENT:      Head: Normocephalic. Eyes:      General: No scleral icterus. Pupils: Pupils are equal, round, and reactive to light. Cardiovascular:      Rate and Rhythm: Normal rate and regular rhythm. Pulses: Normal pulses. Heart sounds: Normal heart sounds. No friction rub. No gallop. Pulmonary:      Effort: Pulmonary effort is normal.      Breath sounds: Normal breath sounds. Musculoskeletal:         General: Tenderness (Over left sacroiliac joint with palpation.) present. Cervical back: Neck supple. Skin:     General: Skin is warm. Capillary Refill: Capillary refill takes less than 2 seconds. Neurological:      General: No focal deficit present. Mental Status: She is alert and oriented to person, place, and time.    Psychiatric:         Mood and Affect: Mood normal.         Behavior: Behavior normal.            Visit Vitals  /72 (BP 1 Location: Left upper arm, BP Patient Position: Sitting)   Pulse 65   Temp 98.1 °F (36.7 °C)   Resp 16   Ht 5' 2\" (1.575 m)   Wt 179 lb 12.8 oz (81.6 kg)   LMP  (LMP Unknown)   SpO2 98%   BMI 32.89 kg/m² Assessment & Plan:    ICD-10-CM ICD-9-CM    1. Encounter for subsequent annual wellness visit (AWV) in Medicare patient  Z00.00 V70.0  WELLNESS EXAM   2. ACP (advance care planning)  Z71.89 V65.49    3. Type 2 diabetes mellitus with stage 3a chronic kidney disease, without long-term current use of insulin (HCC)  S92.43 952.98 METABOLIC PANEL, COMPREHENSIVE    N18.31 585.3 HEMOGLOBIN A1C WITH EAG   4. Mixed hyperlipidemia due to type 2 diabetes mellitus (HCC)  E11.69 250.80     E78.2 272.2    5. Essential hypertension  J38 721.3 METABOLIC PANEL, COMPREHENSIVE   6. Acquired hypothyroidism  E03.9 244.9 TSH 3RD GENERATION      T4, FREE   7. Sacroiliac dysfunction  M53.3 724.6 gabapentin (NEURONTIN) 300 mg capsule   8. On statin therapy  Z79.899 V58.69      1: We will repeat labs today including: CMP, hemoglobin A1c, TSH, and free T4.  2: I will increase patient's gabapentin to 300 mg twice daily as needed for pain. 3: Patient to continue current medications for management of hypertension, mixed hyperlipidemia, and hypothyroidism. 4: Continue to take medication for management of type 2 diabetes. Encouraged to check blood sugars periodically. 5: Patient to work on healthy lifestyle management and weight loss. 6: Follow-up with me in approximately 6 months, or sooner as needed. Patient states understanding and agrees with plan. Advised her to call back or return to office if symptoms worsen/change/persist.  Discussed expected course/resolution/complications of diagnosis in detail with patient. Medication risks/benefits/costs/interactions/alternatives discussed with patient. She was given an after visit summary which includes diagnoses, current medications, & vitals. She expressed understanding with the diagnosis and plan. Signed,  Nikita Felix.  Sommer Hoyos, MSN APRN FNP-BC

## 2022-05-04 NOTE — PROGRESS NOTES
Salma Reyes is a 70 y.o. female    Chief Complaint   Patient presents with    Follow-up     follow up       Visit Vitals  BP (!) 154/82 (BP 1 Location: Left upper arm, BP Patient Position: Sitting, BP Cuff Size: Large adult)   Pulse 65   Temp 98.1 °F (36.7 °C)   Resp 16   Ht 5' 2\" (1.575 m)   Wt 179 lb 12.8 oz (81.6 kg)   LMP  (LMP Unknown)   SpO2 98%   BMI 32.89 kg/m²           1. Have you been to the ER, urgent care clinic since your last visit? Hospitalized since your last visit? NO    2. Have you seen or consulted any other health care providers outside of the [address] [place] [address] since your last visit? Include any pap smears or colon screening.  NO

## 2022-05-04 NOTE — PATIENT INSTRUCTIONS
Sacroiliac Pain: Exercises  Introduction  Here are some examples of exercises for you to try. The exercises may be suggested for a condition or for rehabilitation. Start each exercise slowly. Ease off the exercises if you start to have pain. You will be told when to start these exercises and which ones will work best for you. How to do the exercises  Knee-to-chest stretch    1. Do not do the knee-to-chest exercise if it causes or increases back or leg pain. 2. Lie on your back with your knees bent and your feet flat on the floor. You can put a small pillow under your head and neck if it is more comfortable. 3. Grasp your hands under one knee and bring the knee to your chest, keeping the other foot flat on the floor. 4. Keep your lower back pressed to the floor. Hold for at least 15 to 30 seconds. 5. Relax and lower the knee to the starting position. Repeat with the other leg. 6. Repeat 2 to 4 times with each leg. 7. To get more stretch, keep your other leg flat on the floor while pulling your knee to your chest.  Bridging    1. Lie on your back with both knees bent. Your knees should be bent about 90 degrees. 2. Tighten your belly muscles by pulling in your belly button toward your spine. Then push your feet into the floor, squeeze your buttocks, and lift your hips off the floor until your shoulders, hips, and knees are all in a straight line. 3. Hold for about 6 seconds as you continue to breathe normally, and then slowly lower your hips back down to the floor and rest for up to 10 seconds. 4. Repeat 8 to 12 times. Hip extension    1. Get down on your hands and knees on the floor. 2. Keeping your back and neck straight, lift one leg straight out behind you. When you lift your leg, keep your hips level. Don't let your back twist, and don't let your hip drop toward the floor. 3. Hold for 6 seconds. Repeat 8 to 12 times with each leg.   4. If you feel steady and strong when you do this exercise, you can make it more difficult. To do this, when you lift your leg, also lift the opposite arm straight out in front of you. For example, lift the left leg and the right arm at the same time. (This is sometimes called the \"bird dog exercise. \") Hold for 6 seconds, and repeat 8 to 12 times on each side. Clamshell    1. Lie on your side with a pillow under your head. Keep your feet and knees together and your knees bent. 2. Raise your top knee, but keep your feet together. Do not let your hips roll back. Your legs should open up like a clamshell. 3. Hold for 6 seconds. 4. Slowly lower your knee back down. Rest for 10 seconds. 5. Repeat 8 to 12 times. 6. Switch to your other side and repeat steps 1 through 5. Hamstring wall stretch    1. Lie on your back in a doorway, with one leg through the open door. 2. Slide your affected leg up the wall to straighten your knee. You should feel a gentle stretch down the back of your leg. 3. Hold the stretch for at least 1 minute to begin. Then try to lengthen the time you hold the stretch to as long as 6 minutes. 4. Switch legs, and repeat steps 1 through 3.  5. Repeat 2 to 4 times. 6. If you do not have a place to do this exercise in a doorway, there is another way to do it:  7. Lie on your back, and bend one knee. 8. Loop a towel under the ball and toes of that foot, and hold the ends of the towel in your hands. 9. Straighten your knee, and slowly pull back on the towel. You should feel a gentle stretch down the back of your leg. 10. Switch legs, and repeat steps 1 through 3.  11. Repeat 2 to 4 times. 1. Do not arch your back. 2. Do not bend either knee. 3. Keep one heel touching the floor and the other heel touching the wall. Do not point your toes. Lower abdominal strengthening    1. Lie on your back with your knees bent and your feet flat on the floor. 2. Tighten your belly muscles by pulling your belly button in toward your spine.   3. Lift one foot off the floor and bring your knee toward your chest, so that your knee is straight above your hip and your leg is bent like the letter \"L. \"  4. Lift the other knee up to the same position. 5. Lower one leg at a time to the starting position. 6. Keep alternating legs until you have lifted each leg 8 to 12 times. 7. Be sure to keep your belly muscles tight and your back still as you are moving your legs. Be sure to breathe normally. Piriformis stretch    1. Lie on your back with your legs straight. 2. Lift your affected leg, and bend your knee. With your opposite hand, reach across your body, and then gently pull your knee toward your opposite shoulder. 3. Hold the stretch for 15 to 30 seconds. 4. Switch legs and repeat steps 1 through 3.  5. Repeat 2 to 4 times. Follow-up care is a key part of your treatment and safety. Be sure to make and go to all appointments, and call your doctor if you are having problems. It's also a good idea to know your test results and keep a list of the medicines you take. Where can you learn more? Go to http://www.gray.com/  Enter X541 in the search box to learn more about \"Sacroiliac Pain: Exercises. \"  Current as of: July 1, 2021               Content Version: 13.2  © 2006-2022 [employer]. Care instructions adapted under license by [employer] (which disclaims liability or warranty for this information). If you have questions about a medical condition or this instruction, always ask your healthcare professional. [address] any warranty or liability for your use of this information.

## 2022-05-05 LAB
ALBUMIN SERPL-MCNC: 4.2 G/DL (ref 3.5–5)
ALBUMIN/GLOB SERPL: 1.4 {RATIO} (ref 1.1–2.2)
ALP SERPL-CCNC: 65 U/L (ref 45–117)
ALT SERPL-CCNC: 29 U/L (ref 12–78)
ANION GAP SERPL CALC-SCNC: 5 MMOL/L (ref 5–15)
AST SERPL-CCNC: 27 U/L (ref 15–37)
BILIRUB SERPL-MCNC: 0.3 MG/DL (ref 0.2–1)
BUN SERPL-MCNC: 26 MG/DL (ref 6–20)
BUN/CREAT SERPL: 18 (ref 12–20)
CALCIUM SERPL-MCNC: 9.8 MG/DL (ref 8.5–10.1)
CHLORIDE SERPL-SCNC: 106 MMOL/L (ref 97–108)
CO2 SERPL-SCNC: 30 MMOL/L (ref 21–32)
COMMENT, HOLDF: NORMAL
CREAT SERPL-MCNC: 1.48 MG/DL (ref 0.55–1.02)
EST. AVERAGE GLUCOSE BLD GHB EST-MCNC: 154 MG/DL
GLOBULIN SER CALC-MCNC: 3 G/DL (ref 2–4)
GLUCOSE SERPL-MCNC: 141 MG/DL (ref 65–100)
HBA1C MFR BLD: 7 % (ref 4–5.6)
POTASSIUM SERPL-SCNC: 5 MMOL/L (ref 3.5–5.1)
PROT SERPL-MCNC: 7.2 G/DL (ref 6.4–8.2)
SAMPLES BEING HELD,HOLD: NORMAL
SODIUM SERPL-SCNC: 141 MMOL/L (ref 136–145)
T4 FREE SERPL-MCNC: 1.3 NG/DL (ref 0.8–1.5)
TSH SERPL DL<=0.05 MIU/L-ACNC: 0.84 UIU/ML (ref 0.36–3.74)

## 2022-05-05 RX ORDER — LEVOTHYROXINE SODIUM 88 UG/1
TABLET ORAL
Qty: 90 TABLET | Refills: 1 | Status: SHIPPED | OUTPATIENT
Start: 2022-05-05 | End: 2022-06-07 | Stop reason: SDUPTHER

## 2022-05-06 ENCOUNTER — PATIENT MESSAGE (OUTPATIENT)
Dept: INTERNAL MEDICINE CLINIC | Age: 72
End: 2022-05-06

## 2022-05-06 DIAGNOSIS — N18.32 STAGE 3B CHRONIC KIDNEY DISEASE (CKD) (HCC): Primary | ICD-10-CM

## 2022-05-06 NOTE — PROGRESS NOTES
Thyroid shows to be in normal functioning range. Hemoglobin A1c has improved and is now at 7.0%. Good! Continue to work on diet, avoidance of concentrated sweets and excess carbohydrates. Metabolic panel shows decline in kidney function overall. I am concerned about this. I would like to refer you to a nephrologist for further evaluation. Please let me know if you are okay with a referral.     You also show to be mildly dehydrated. Please drink a bit more water. Liver functions and electrolytes are okay.

## 2022-05-06 NOTE — TELEPHONE ENCOUNTER
From: Salma Reyes  To: Joleen Sierra NP  Sent: 5/6/2022 12:14 PM EDT  Subject: Referral to nephrologist     I received your test results letter, and am fine with a referral to the nephrologist. I saw Dr. Dariana Mustafa at [facility]. My last visit was 1/18/2018. I would like to see another doctor, if possible. Preferably someone at [facility] or in the [address] [place] [address] area. Thank you!   Salma Reyes coffee

## 2022-05-12 NOTE — PROGRESS NOTES
Pt has seen lab results. Pt states she has made a appointment with a nephrology office. Re:  Thyroid shows to be in normal functioning range. Hemoglobin A1c has improved and is now at 7.0%.  Good!  Continue to work on diet, avoidance of concentrated sweets and excess carbohydrates.       Metabolic panel shows decline in kidney function overall.  I am concerned about this. Pedrito Álvarez would like to refer you to a nephrologist for further evaluation. Rene Jacobson let me know if you are okay with a referral.     You also show to be mildly dehydrated.  Please drink a bit more water.  Liver functions and electrolytes are okay.

## 2022-06-07 DIAGNOSIS — I10 ESSENTIAL HYPERTENSION: ICD-10-CM

## 2022-06-08 RX ORDER — AMLODIPINE BESYLATE 2.5 MG/1
2.5 TABLET ORAL DAILY
Qty: 90 TABLET | Refills: 1 | Status: SHIPPED | OUTPATIENT
Start: 2022-06-08 | End: 2022-06-20 | Stop reason: SDUPTHER

## 2022-06-08 RX ORDER — LEVOTHYROXINE SODIUM 88 UG/1
TABLET ORAL
Qty: 90 TABLET | Refills: 1 | Status: SHIPPED | OUTPATIENT
Start: 2022-06-08 | End: 2022-06-20 | Stop reason: SDUPTHER

## 2022-06-08 NOTE — TELEPHONE ENCOUNTER
PCP: Kevan Brooks NP    Last appt: 5/4/2022  Future Appointments   Date Time Provider Royer Bowers   11/7/2022  8:30 AM Kevan Brooks NP [facility] AMB       Requested Prescriptions     Pending Prescriptions Disp Refills    levothyroxine (SYNTHROID) 88 mcg tablet 90 Tablet 1     Sig: TAKE 1 TABLET BY MOUTH EVERY DAY BEFORE BREAKFAST    amLODIPine (NORVASC) 2.5 mg tablet 90 Tablet 1     Sig: Take 1 Tablet by mouth daily.  FOR HIGH BLOOD PRESSURE       Prior labs and Blood pressures:  BP Readings from Last 3 Encounters:   05/04/22 136/72   02/03/22 138/70   10/27/21 (!) 150/82     Lab Results   Component Value Date/Time    Sodium 141 05/04/2022 09:33 AM    Potassium 5.0 05/04/2022 09:33 AM    Chloride 106 05/04/2022 09:33 AM    CO2 30 05/04/2022 09:33 AM    Anion gap 5 05/04/2022 09:33 AM    Glucose 141 (H) 05/04/2022 09:33 AM    BUN 26 (H) 05/04/2022 09:33 AM    Creatinine 1.48 (H) 05/04/2022 09:33 AM    BUN/Creatinine ratio 18 05/04/2022 09:33 AM    GFR est AA 42 (L) 05/04/2022 09:33 AM    GFR est non-AA 35 (L) 05/04/2022 09:33 AM    Calcium 9.8 05/04/2022 09:33 AM     Lab Results   Component Value Date/Time    Hemoglobin A1c 7.0 (H) 05/04/2022 09:33 AM    Hemoglobin A1c (POC) 5.7 10/12/2018 08:26 AM    Hemoglobin A1c, External 7.9 11/05/2015 12:00 AM     Lab Results   Component Value Date/Time    Cholesterol, total 140 02/03/2022 10:13 AM    Cholesterol (POC) 146.0 10/12/2018 08:26 AM    HDL Cholesterol 37 02/03/2022 10:13 AM    HDL Cholesterol (POC) 41.0 10/12/2018 08:26 AM    LDL Cholesterol (POC) 54.2 10/12/2018 08:26 AM    LDL, calculated 57.8 02/03/2022 10:13 AM    VLDL, calculated 45.2 02/03/2022 10:13 AM    Triglyceride 226 (H) 02/03/2022 10:13 AM    Triglycerides (POC) 254.0 (A) 10/12/2018 08:26 AM    CHOL/HDL Ratio 3.8 02/03/2022 10:13 AM     Lab Results   Component Value Date/Time    VITAMIN D, 25-HYDROXY 55 01/11/2021 09:57 AM       Lab Results   Component Value Date/Time    TSH 0.84 05/04/2022 09:33 AM    TSH, 3rd generation 0.71 01/11/2021 09:57 AM

## 2022-06-20 DIAGNOSIS — I10 ESSENTIAL HYPERTENSION: ICD-10-CM

## 2022-06-20 RX ORDER — AMLODIPINE BESYLATE 2.5 MG/1
2.5 TABLET ORAL DAILY
Qty: 90 TABLET | Refills: 1 | Status: SHIPPED | OUTPATIENT
Start: 2022-06-20

## 2022-06-20 RX ORDER — LEVOTHYROXINE SODIUM 88 UG/1
TABLET ORAL
Qty: 90 TABLET | Refills: 1 | Status: SHIPPED | OUTPATIENT
Start: 2022-06-20

## 2022-06-20 NOTE — TELEPHONE ENCOUNTER
PCP: Sujey Gutierrez NP    Last appt: 5/4/2022  Future Appointments   Date Time Provider Royer Bowers   11/7/2022  8:30 AM Sujey Gutierrez NP [facility] BS AMB       Requested Prescriptions     Pending Prescriptions Disp Refills    levothyroxine (SYNTHROID) 88 mcg tablet 90 Tablet 1     Sig: TAKE 1 TABLET BY MOUTH EVERY DAY BEFORE BREAKFAST    amLODIPine (NORVASC) 2.5 mg tablet 90 Tablet 1     Sig: Take 1 Tablet by mouth daily.  FOR HIGH BLOOD PRESSURE       Prior labs and Blood pressures:  BP Readings from Last 3 Encounters:   05/04/22 136/72   02/03/22 138/70   10/27/21 (!) 150/82     Lab Results   Component Value Date/Time    Sodium 141 05/04/2022 09:33 AM    Potassium 5.0 05/04/2022 09:33 AM    Chloride 106 05/04/2022 09:33 AM    CO2 30 05/04/2022 09:33 AM    Anion gap 5 05/04/2022 09:33 AM    Glucose 141 (H) 05/04/2022 09:33 AM    BUN 26 (H) 05/04/2022 09:33 AM    Creatinine 1.48 (H) 05/04/2022 09:33 AM    BUN/Creatinine ratio 18 05/04/2022 09:33 AM    GFR est AA 42 (L) 05/04/2022 09:33 AM    GFR est non-AA 35 (L) 05/04/2022 09:33 AM    Calcium 9.8 05/04/2022 09:33 AM

## 2022-07-18 NOTE — TELEPHONE ENCOUNTER
PCP: Cherylene Gip, NP    Last appt: 2022  Future Appointments   Date Time Provider Royer Bowers   2022  8:30 AM Cherylene Gip, NP [facility] AMB       Requested Prescriptions     Pending Prescriptions Disp Refills    losartan (COZAAR) 100 mg tablet 90 Tablet 3     Si Tablet daily.        Prior labs and Blood pressures:  BP Readings from Last 3 Encounters:   22 136/72   22 138/70   10/27/21 (!) 150/82     Lab Results   Component Value Date/Time    Sodium 141 2022 09:33 AM    Potassium 5.0 2022 09:33 AM    Chloride 106 2022 09:33 AM    CO2 30 2022 09:33 AM    Anion gap 5 2022 09:33 AM    Glucose 141 (H) 2022 09:33 AM    BUN 26 (H) 2022 09:33 AM    Creatinine 1.48 (H) 2022 09:33 AM    BUN/Creatinine ratio 18 2022 09:33 AM    GFR est AA 42 (L) 2022 09:33 AM    GFR est non-AA 35 (L) 2022 09:33 AM    Calcium 9.8 2022 09:33 AM

## 2022-07-18 NOTE — TELEPHONE ENCOUNTER
PCP: Jose Angel Sweet NP    Last appt: 2022  Future Appointments   Date Time Provider Royer Bowers   2022  8:30 AM Jose Angel Sweet NP [facility] AMB       Requested Prescriptions     Pending Prescriptions Disp Refills    losartan (COZAAR) 100 mg tablet 90 Tablet 3     Si Tablet daily.          Other Comments:

## 2022-07-19 RX ORDER — LOSARTAN POTASSIUM 100 MG/1
TABLET ORAL
Qty: 90 TABLET | Refills: 1 | Status: SHIPPED | OUTPATIENT
Start: 2022-07-19

## 2022-10-21 DIAGNOSIS — E11.69 MIXED HYPERLIPIDEMIA DUE TO TYPE 2 DIABETES MELLITUS (HCC): ICD-10-CM

## 2022-10-21 DIAGNOSIS — E78.2 MIXED HYPERLIPIDEMIA DUE TO TYPE 2 DIABETES MELLITUS (HCC): ICD-10-CM

## 2022-10-21 RX ORDER — FENOFIBRATE 160 MG/1
TABLET ORAL
Qty: 90 TABLET | Refills: 1 | Status: SHIPPED | OUTPATIENT
Start: 2022-10-21

## 2022-11-07 ENCOUNTER — OFFICE VISIT (OUTPATIENT)
Dept: INTERNAL MEDICINE CLINIC | Age: 72
End: 2022-11-07
Payer: MEDICARE

## 2022-11-07 VITALS
TEMPERATURE: 98.3 F | RESPIRATION RATE: 16 BRPM | DIASTOLIC BLOOD PRESSURE: 72 MMHG | HEART RATE: 69 BPM | OXYGEN SATURATION: 95 % | BODY MASS INDEX: 34.08 KG/M2 | HEIGHT: 62 IN | WEIGHT: 185.2 LBS | SYSTOLIC BLOOD PRESSURE: 130 MMHG

## 2022-11-07 DIAGNOSIS — E11.22 TYPE 2 DIABETES MELLITUS WITH STAGE 3A CHRONIC KIDNEY DISEASE, WITHOUT LONG-TERM CURRENT USE OF INSULIN (HCC): Primary | ICD-10-CM

## 2022-11-07 DIAGNOSIS — E03.9 ACQUIRED HYPOTHYROIDISM: ICD-10-CM

## 2022-11-07 DIAGNOSIS — M53.86 SCIATICA ASSOCIATED WITH DISORDER OF LUMBAR SPINE: ICD-10-CM

## 2022-11-07 DIAGNOSIS — N18.31 TYPE 2 DIABETES MELLITUS WITH STAGE 3A CHRONIC KIDNEY DISEASE, WITHOUT LONG-TERM CURRENT USE OF INSULIN (HCC): Primary | ICD-10-CM

## 2022-11-07 DIAGNOSIS — E66.09 CLASS 1 OBESITY DUE TO EXCESS CALORIES WITH SERIOUS COMORBIDITY AND BODY MASS INDEX (BMI) OF 33.0 TO 33.9 IN ADULT: ICD-10-CM

## 2022-11-07 DIAGNOSIS — I10 ESSENTIAL HYPERTENSION: ICD-10-CM

## 2022-11-07 DIAGNOSIS — E11.69 MIXED HYPERLIPIDEMIA DUE TO TYPE 2 DIABETES MELLITUS (HCC): ICD-10-CM

## 2022-11-07 DIAGNOSIS — E78.2 MIXED HYPERLIPIDEMIA DUE TO TYPE 2 DIABETES MELLITUS (HCC): ICD-10-CM

## 2022-11-07 PROBLEM — E66.811 CLASS 1 OBESITY DUE TO EXCESS CALORIES WITH SERIOUS COMORBIDITY AND BODY MASS INDEX (BMI) OF 33.0 TO 33.9 IN ADULT: Status: ACTIVE | Noted: 2022-11-07

## 2022-11-07 PROCEDURE — G8427 DOCREV CUR MEDS BY ELIG CLIN: HCPCS | Performed by: NURSE PRACTITIONER

## 2022-11-07 PROCEDURE — G8432 DEP SCR NOT DOC, RNG: HCPCS | Performed by: NURSE PRACTITIONER

## 2022-11-07 PROCEDURE — 3074F SYST BP LT 130 MM HG: CPT | Performed by: NURSE PRACTITIONER

## 2022-11-07 PROCEDURE — G8536 NO DOC ELDER MAL SCRN: HCPCS | Performed by: NURSE PRACTITIONER

## 2022-11-07 PROCEDURE — 3051F HG A1C>EQUAL 7.0%<8.0%: CPT | Performed by: NURSE PRACTITIONER

## 2022-11-07 PROCEDURE — G8752 SYS BP LESS 140: HCPCS | Performed by: NURSE PRACTITIONER

## 2022-11-07 PROCEDURE — 1090F PRES/ABSN URINE INCON ASSESS: CPT | Performed by: NURSE PRACTITIONER

## 2022-11-07 PROCEDURE — 3078F DIAST BP <80 MM HG: CPT | Performed by: NURSE PRACTITIONER

## 2022-11-07 PROCEDURE — 1101F PT FALLS ASSESS-DOCD LE1/YR: CPT | Performed by: NURSE PRACTITIONER

## 2022-11-07 PROCEDURE — G9899 SCRN MAM PERF RSLTS DOC: HCPCS | Performed by: NURSE PRACTITIONER

## 2022-11-07 PROCEDURE — 99214 OFFICE O/P EST MOD 30 MIN: CPT | Performed by: NURSE PRACTITIONER

## 2022-11-07 PROCEDURE — 1123F ACP DISCUSS/DSCN MKR DOCD: CPT | Performed by: NURSE PRACTITIONER

## 2022-11-07 PROCEDURE — 3017F COLORECTAL CA SCREEN DOC REV: CPT | Performed by: NURSE PRACTITIONER

## 2022-11-07 PROCEDURE — G8754 DIAS BP LESS 90: HCPCS | Performed by: NURSE PRACTITIONER

## 2022-11-07 PROCEDURE — 2022F DILAT RTA XM EVC RTNOPTHY: CPT | Performed by: NURSE PRACTITIONER

## 2022-11-07 PROCEDURE — G8417 CALC BMI ABV UP PARAM F/U: HCPCS | Performed by: NURSE PRACTITIONER

## 2022-11-07 RX ORDER — CALCIUM CITRATE/VITAMIN D3 200MG-6.25
TABLET ORAL
Qty: 100 STRIP | Refills: 1 | Status: SHIPPED | OUTPATIENT
Start: 2022-11-07 | End: 2022-11-11 | Stop reason: SDUPTHER

## 2022-11-07 RX ORDER — GABAPENTIN 300 MG/1
CAPSULE ORAL
Qty: 270 CAPSULE | Refills: 1 | Status: SHIPPED | OUTPATIENT
Start: 2022-11-07

## 2022-11-07 NOTE — PROGRESS NOTES
Krystal Reyes is a 67 y.o. female     Chief Complaint   Patient presents with    Hypertension     6M follow up    Diabetes     6M follow up       Visit Vitals  /72 (BP 1 Location: Left arm, BP Patient Position: Sitting, BP Cuff Size: Adult)   Pulse 69   Temp 98.3 °F (36.8 °C) (Oral)   Resp 16   Ht 5' 2\" (1.575 m)   Wt 185 lb 3.2 oz (84 kg)   LMP  (LMP Unknown)   SpO2 95%   BMI 33.87 kg/m²       Health Maintenance Due   Topic Date Due    DTaP/Tdap/Td series (1 - Tdap) Never done    Foot Exam Q1  01/11/2022         1. \"Have you been to the ER, urgent care clinic since your last visit? Hospitalized since your last visit? \"  Seen at [facility] on 10/1/22 for strep throat    2. \"Have you seen or consulted any other health care providers outside of the [address] [place] [address] since your last visit? \" No     3. For patients aged 39-70: Has the patient had a colonoscopy / FIT/ Cologuard? Yes - no Care Gap present      If the patient is female:    4. For patients aged 41-77: Has the patient had a mammogram within the past 2 years? Yes - no Care Gap present      5. For patients aged 21-65: Has the patient had a pap smear?  NA - based on age or sex

## 2022-11-07 NOTE — PROGRESS NOTES
Chief Complaint   Patient presents with    Hypertension     6M follow up    Diabetes     6M follow up       SUBJECTIVE:    Carol Ann Euceda is a 67 y.o. female who is here today for a follow up appointment regarding current medical conditions which include: Type 2 diabetes with stage IIIa chronic kidney disease and mixed hyperlipidemia, essential hypertension, sciatica associated with lumbar spine issues, acquired hypothyroidism, and obesity. She states she is feeling fairly well overall, and denies any new or acute complaints. The patient continues to take her medication as prescribed for management of her hypertension and mixed hyperlipidemia. She is tolerating these well without adverse side effects. Her blood pressure appears well controlled at this time. She denies any chest pain, chest pressure, shortness of breath, headaches, dizziness, blurred vision, palpitations, or syncope episodes. She continues to be managed for type 2 diabetes and is tolerating her medication as prescribed. She denies any side effects to medication. She admits that she is not checking her blood sugars on a regular basis. Her last hemoglobin A1c was approximately 7.0%. She states she has not been as watchful of her diet as she should be. She continues to suffer from low back pain with sciatica especially to her left leg. She states she is using her gabapentin as prescribed, but thinks a higher dose at night may be more helpful. She continues to take levothyroxine for management of her hypothyroidism. She denies any adverse side effects of the medication. Current Outpatient Medications   Medication Sig Dispense Refill    gabapentin (NEURONTIN) 300 mg capsule Take 1 capsule by mouth each morning, take 2 capsules at bedtime. 270 Capsule 1    glucose blood VI test strips (True Metrix Glucose Test Strip) strip Check blood glucose levels before breakfast each day.  100 Strip 1    fenofibrate (LOFIBRA) 160 mg tablet TAKE 1 TABLET BY MOUTH DAILY FOR HIGH CHOLESTEROL 90 Tablet 1    losartan (COZAAR) 100 mg tablet TAKE 1 TABLET DAILY 90 Tablet 1    metFORMIN ER (GLUCOPHAGE XR) 500 mg tablet TAKE 1 TABLET TWICE A DAY WITH MEALS 180 Tablet 1    levothyroxine (SYNTHROID) 88 mcg tablet TAKE 1 TABLET BY MOUTH EVERY DAY BEFORE BREAKFAST 90 Tablet 1    amLODIPine (NORVASC) 2.5 mg tablet Take 1 Tablet by mouth daily. FOR HIGH BLOOD PRESSURE 90 Tablet 1    clobetasoL (TEMOVATE) 0.05 % topical cream Apply  to affected area two (2) times a day. 30 g 2    silicones/castor oil (HAND PROTECTIVE EX) by Apply Externally route. Hand gel Keratoderma bare 40 gel cream      tazarotene (Arazlo) 0.045 % lotn by Apply Externally route. calcium citrate-vitamin D3 (CITRACAL WITH VITAMIN D MAXIMUM) tablet Take  by mouth two (2) times a day. B.animalis,bifid,infantis,long 10-15 mg TbEC Take  by mouth. MULTIVITAMIN/IRON/FOLIC ACID (CENTRUM COMPLETE PO) Take  by mouth daily. Fit for me multivitamin      fluticasone propionate (FLONASE) 50 mcg/actuation nasal spray nightly. CETIRIZINE HCL (ZYRTEC PO) Take 10 mg by mouth daily.       Blood-Glucose Meter (Relion Confirm) monitoring kit Please use to check BG  fasting blood sugar 1 time in a day (Patient not taking: Reported on 11/7/2022) 1 Kit 0     Past Medical History:   Diagnosis Date    Abnormal kidney function 10/18/2017    Abnormal presence of protein in urine 10/18/2017    Acute serous otitis media of left ear 10/18/2017    Comments: recurrence not specified    Allergic rhinitis     Anemia     Anemia in chronic kidney disease 10/18/2017    Annual physical exam 10/18/2017    Back pain     Basal cell carcinoma     of tip of nose    Boil of trunk 10/18/2017    Bronchitis 10/18/2017    Carcinoma ([facility] [address] 10/18/2017    Comments: basal cell nose s/p excision    Chest pain, exertional 10/18/2017    Cough 10/18/2017    Dry mouth     Eczema 10/18/2017    Comments: intrinsic     History of cholecystectomy 10/18/2017    Comments: open     HTN (hypertension) 10/18/2017    Kidney stone     LUIS FERNANDO (obstructive sleep apnea)     Osteopenia     Osteoporosis 10/18/2017    Other and unspecified hyperlipidemia     Pigmentary retinopathy     Type II or unspecified type diabetes mellitus without mention of complication, uncontrolled     Unspecified essential hypertension     Unspecified hypothyroidism     Unspecified vitamin D deficiency      Past Surgical History:   Procedure Laterality Date    COLONOSCOPY N/A 4/10/2018    COLONOSCOPY performed by Deyanira Anaya MD at [facility] ENDOSCOPY    DELIVERY       x2    HX CHOLECYSTECTOMY      HX OTHER SURGICAL  3/10/16    gastric sleeve    HX POLYPECTOMY      HX TONSILLECTOMY       Allergies   Allergen Reactions    Niacin Other (comments)     Skin irritation    Sulfa (Sulfonamide Antibiotics) Unknown (comments)    Tricor [Fenofibrate Micronized] Swelling       REVIEW OF SYSTEMS:                                        POSITIVE= bold text  Negative = regular text    General:                     fever, chills, sweats, generalized weakness, weight loss/gain,                                       loss of appetite   Eyes:                           blurred vision, eye pain, loss of vision, double vision  ENT:                            rhinorrhea, pharyngitis   Respiratory:               cough, sputum production, SOB, WOLFF, wheezing, pleuritic pain   Cardiology:                chest pain, palpitations, orthopnea, PND, edema, syncope   Gastrointestinal:       abdominal pain , N/V, diarrhea, dysphagia, constipation, bleeding   Genitourinary:           frequency, urgency, dysuria, hematuria, incontinence   Muskuloskeletal :      arthralgia, myalgia, back pain  Hematology:              easy bruising, nose or gum bleeding, lymphadenopathy   Dermatological:         rash, ulceration, pruritis, color change / jaundice  Endocrine:                 hot flashes or polydipsia Neurological:             headache, dizziness, confusion, focal weakness, paresthesia,                                      Speech difficulties, memory loss, gait difficulty  Psychological:          Feelings of anxiety, depression, agitation        Social History     Socioeconomic History    Marital status:    Tobacco Use    Smoking status: Never    Smokeless tobacco: Never   Vaping Use    Vaping Use: Never used   Substance and Sexual Activity    Alcohol use: Not Currently     Comment: maybe a few times a year at most    Drug use: No    Sexual activity: Not Currently   Social History Narrative    Lives in [facility] with  of 44 years. Has one daughter and one son. Retired   at [employer]. Likes to read and play on the computer. Social Determinants of Health     Financial Resource Strain: Low Risk     Difficulty of Paying Living Expenses: Not hard at all   Food Insecurity: No Food Insecurity    Worried About Running Out of Food in the Last Year: Never true    Ran Out of Food in the Last Year: Never true     Family History   Problem Relation Age of Onset    Diabetes Father     Stroke Father     Cancer Mother         lung    Other Mother         aortic aneurysm       OBJECTIVE:     Visit Vitals  /72 (BP 1 Location: Left arm, BP Patient Position: Sitting, BP Cuff Size: Adult)   Pulse 69   Temp 98.3 °F (36.8 °C) (Oral)   Resp 16   Ht 5' 2\" (1.575 m)   Wt 185 lb 3.2 oz (84 kg)   LMP  (LMP Unknown)   SpO2 95%   BMI 33.87 kg/m²       Constitutional: She appears well nourished, of stated age, and dressed appropriately. Eyes: Sclera anicteric, PERRLA, EOMI  Neck: Supple without lymphadenopathy. Thyroid normal, No JVD or bruits  Respiratory: Clear to ascultation X5, normal inspiratory effort, no adventitious breath sounds.   Cardiovascular: Regular rate and rhythm, mild systolic murmur, but without rubs or gallops, PMI not displaced, No thrills, no peripheral edema  Neuro: Non-focal exam, A & O X 3.   Psychiatric: Appropriate affect and demeanor, pleasant and cooperative. Patient's thought content and thought processing appear to be within normal limits. ASSESSMENT/PLAN:     ICD-10-CM ICD-9-CM    1. Type 2 diabetes mellitus with stage 3a chronic kidney disease, without long-term current use of insulin ([facility])  G63.54 217.09 METABOLIC PANEL, COMPREHENSIVE    N18.31 585.3 HEMOGLOBIN A1C WITH EAG      glucose blood VI test strips (True Metrix Glucose Test Strip) strip      2. Mixed hyperlipidemia due to type 2 diabetes mellitus ([facility])  E11.69 250.80 LIPID PANEL    E78.2 272.2       3. Essential hypertension  I03 963.8 METABOLIC PANEL, COMPREHENSIVE      4. Acquired hypothyroidism  E03.9 244.9 TSH 3RD GENERATION      T4, FREE      5. Sciatica associated with disorder of lumbar spine  M53.86 724.3 gabapentin (NEURONTIN) 300 mg capsule      6. Class 1 obesity due to excess calories with serious comorbidity and body mass index (BMI) of 33.0 to 33.9 in adult  E66.09 278.00     Z68.33 V85.33         1: We will repeat labs today including: CMP, hemoglobin A1c, lipid panel, TSH, and free T4.  2: Patient advised to check blood sugars on a regular basis before breakfast each day. New prescription for test strips sent to pharmacy. 3: Patient to continue current medication for management of hypertension. Patient appears to be well controlled at this time. 4: Continue levothyroxine daily for management of hypothyroidism. Patient tolerating well.  5: I am going to increase patient's gabapentin to 1 capsule each morning, and 2 capsules each evening for improved control of sciatica pain. 6: Patient encouraged to follow appropriate dietary restrictions with avoidance of concentrated sweets and excess carbohydrates. Patient acknowledges. 7: Patient to continue all other medications as prescribed.   8: Patient to follow-up with me in approximately 6 months, or sooner as needed. Patient states understanding and agrees with plan. Orders Placed This Encounter    METABOLIC PANEL, COMPREHENSIVE    HEMOGLOBIN A1C WITH EAG    LIPID PANEL    TSH 3RD GENERATION    T4, FREE    gabapentin (NEURONTIN) 300 mg capsule    glucose blood VI test strips (True Metrix Glucose Test Strip) strip         ATTENTION:   This medical record was transcribed using an electronic medical records system. Although proofread, it may and can contain electronic and spelling errors. Other human spelling and other errors may be present. Corrections may be executed at a later time. Please feel free to contact us for any clarifications as needed. Signed,  Marta Jaime.  Moriah Garner, MSN APRN FNP-BC

## 2022-11-08 LAB
ALBUMIN SERPL-MCNC: 4.1 G/DL (ref 3.5–5)
ALBUMIN/GLOB SERPL: 1.4 {RATIO} (ref 1.1–2.2)
ALP SERPL-CCNC: 82 U/L (ref 45–117)
ALT SERPL-CCNC: 25 U/L (ref 12–78)
ANION GAP SERPL CALC-SCNC: 7 MMOL/L (ref 5–15)
AST SERPL-CCNC: 18 U/L (ref 15–37)
BILIRUB SERPL-MCNC: 0.4 MG/DL (ref 0.2–1)
BUN SERPL-MCNC: 25 MG/DL (ref 6–20)
BUN/CREAT SERPL: 17 (ref 12–20)
CALCIUM SERPL-MCNC: 9.9 MG/DL (ref 8.5–10.1)
CHLORIDE SERPL-SCNC: 103 MMOL/L (ref 97–108)
CHOLEST SERPL-MCNC: 156 MG/DL
CO2 SERPL-SCNC: 29 MMOL/L (ref 21–32)
CREAT SERPL-MCNC: 1.48 MG/DL (ref 0.55–1.02)
EST. AVERAGE GLUCOSE BLD GHB EST-MCNC: 275 MG/DL
GLOBULIN SER CALC-MCNC: 3 G/DL (ref 2–4)
GLUCOSE SERPL-MCNC: 291 MG/DL (ref 65–100)
HBA1C MFR BLD: 11.2 % (ref 4–5.6)
HDLC SERPL-MCNC: 33 MG/DL
HDLC SERPL: 4.7 {RATIO} (ref 0–5)
LDLC SERPL CALC-MCNC: 62.4 MG/DL (ref 0–100)
POTASSIUM SERPL-SCNC: 4.8 MMOL/L (ref 3.5–5.1)
PROT SERPL-MCNC: 7.1 G/DL (ref 6.4–8.2)
SODIUM SERPL-SCNC: 139 MMOL/L (ref 136–145)
T4 FREE SERPL-MCNC: 1.3 NG/DL (ref 0.8–1.5)
TRIGL SERPL-MCNC: 303 MG/DL (ref ?–150)
TSH SERPL DL<=0.05 MIU/L-ACNC: 0.91 UIU/ML (ref 0.36–3.74)
VLDLC SERPL CALC-MCNC: 60.6 MG/DL

## 2022-11-08 NOTE — PROGRESS NOTES
Thyroid levels are in good range. Cholesterol levels are okay, but triglycerides remain quite high. Please avoid excess fats in the diet. Hemoglobin A1c shows diabetes to be completely out of control and is now at 11.2%. It is important that you check your blood sugars routinely. Please check every morning before breakfast and write down results. I am going to add a different medication to your current regimen to help get this under control. Please avoid any and all concentrated sweets and excess carbohydrates in diet. I need to see you back in approximately 6 weeks for a blood sugar review. Bring your blood sugar results with you to the visit so that I can see how things are going.

## 2022-11-11 DIAGNOSIS — N18.31 TYPE 2 DIABETES MELLITUS WITH STAGE 3A CHRONIC KIDNEY DISEASE, WITHOUT LONG-TERM CURRENT USE OF INSULIN (HCC): ICD-10-CM

## 2022-11-11 DIAGNOSIS — E11.22 TYPE 2 DIABETES MELLITUS WITH STAGE 3A CHRONIC KIDNEY DISEASE, WITHOUT LONG-TERM CURRENT USE OF INSULIN (HCC): ICD-10-CM

## 2022-11-11 RX ORDER — CALCIUM CITRATE/VITAMIN D3 200MG-6.25
TABLET ORAL
Qty: 100 STRIP | Refills: 1 | Status: SHIPPED | OUTPATIENT
Start: 2022-11-11

## 2022-11-11 NOTE — TELEPHONE ENCOUNTER
PCP: Jm Glynn NP    Last appt: 11/7/2022  Future Appointments   Date Time Provider Royer Bowers   5/8/2023  9:00 AM Jm Glynn NP [facility] BS AMB       Requested Prescriptions     Pending Prescriptions Disp Refills    glucose blood VI test strips (True Metrix Glucose Test Strip) strip 100 Strip 1     Sig: Check blood glucose levels before breakfast each day.        Prior labs and Blood pressures:  BP Readings from Last 3 Encounters:   11/07/22 130/72   05/04/22 136/72   02/03/22 138/70     Lab Results   Component Value Date/Time    Sodium 139 11/07/2022 09:37 AM    Potassium 4.8 11/07/2022 09:37 AM    Chloride 103 11/07/2022 09:37 AM    CO2 29 11/07/2022 09:37 AM    Anion gap 7 11/07/2022 09:37 AM    Glucose 291 (H) 11/07/2022 09:37 AM    BUN 25 (H) 11/07/2022 09:37 AM    Creatinine 1.48 (H) 11/07/2022 09:37 AM    BUN/Creatinine ratio 17 11/07/2022 09:37 AM    GFR est AA 42 (L) 05/04/2022 09:33 AM    GFR est non-AA 35 (L) 05/04/2022 09:33 AM    Calcium 9.9 11/07/2022 09:37 AM

## 2023-01-06 ENCOUNTER — OFFICE VISIT (OUTPATIENT)
Dept: INTERNAL MEDICINE CLINIC | Age: 73
End: 2023-01-06
Payer: MEDICARE

## 2023-01-06 ENCOUNTER — TELEPHONE (OUTPATIENT)
Dept: INTERNAL MEDICINE CLINIC | Age: 73
End: 2023-01-06

## 2023-01-06 VITALS
RESPIRATION RATE: 16 BRPM | TEMPERATURE: 98.4 F | WEIGHT: 183.4 LBS | OXYGEN SATURATION: 95 % | HEIGHT: 62 IN | SYSTOLIC BLOOD PRESSURE: 132 MMHG | BODY MASS INDEX: 33.75 KG/M2 | HEART RATE: 66 BPM | DIASTOLIC BLOOD PRESSURE: 72 MMHG

## 2023-01-06 DIAGNOSIS — E11.22 TYPE 2 DIABETES MELLITUS WITH STAGE 3A CHRONIC KIDNEY DISEASE, WITHOUT LONG-TERM CURRENT USE OF INSULIN (HCC): Primary | ICD-10-CM

## 2023-01-06 DIAGNOSIS — E66.09 CLASS 1 OBESITY DUE TO EXCESS CALORIES WITH SERIOUS COMORBIDITY AND BODY MASS INDEX (BMI) OF 33.0 TO 33.9 IN ADULT: ICD-10-CM

## 2023-01-06 DIAGNOSIS — E78.2 MIXED HYPERLIPIDEMIA DUE TO TYPE 2 DIABETES MELLITUS (HCC): ICD-10-CM

## 2023-01-06 DIAGNOSIS — N18.31 TYPE 2 DIABETES MELLITUS WITH STAGE 3A CHRONIC KIDNEY DISEASE, WITHOUT LONG-TERM CURRENT USE OF INSULIN (HCC): Primary | ICD-10-CM

## 2023-01-06 DIAGNOSIS — E03.9 ACQUIRED HYPOTHYROIDISM: ICD-10-CM

## 2023-01-06 DIAGNOSIS — I10 ESSENTIAL HYPERTENSION: ICD-10-CM

## 2023-01-06 DIAGNOSIS — E11.69 MIXED HYPERLIPIDEMIA DUE TO TYPE 2 DIABETES MELLITUS (HCC): ICD-10-CM

## 2023-01-06 PROCEDURE — 2022F DILAT RTA XM EVC RTNOPTHY: CPT | Performed by: NURSE PRACTITIONER

## 2023-01-06 PROCEDURE — G8536 NO DOC ELDER MAL SCRN: HCPCS | Performed by: NURSE PRACTITIONER

## 2023-01-06 PROCEDURE — 3046F HEMOGLOBIN A1C LEVEL >9.0%: CPT | Performed by: NURSE PRACTITIONER

## 2023-01-06 PROCEDURE — G9899 SCRN MAM PERF RSLTS DOC: HCPCS | Performed by: NURSE PRACTITIONER

## 2023-01-06 PROCEDURE — 1090F PRES/ABSN URINE INCON ASSESS: CPT | Performed by: NURSE PRACTITIONER

## 2023-01-06 PROCEDURE — 3078F DIAST BP <80 MM HG: CPT | Performed by: NURSE PRACTITIONER

## 2023-01-06 PROCEDURE — G8432 DEP SCR NOT DOC, RNG: HCPCS | Performed by: NURSE PRACTITIONER

## 2023-01-06 PROCEDURE — 3075F SYST BP GE 130 - 139MM HG: CPT | Performed by: NURSE PRACTITIONER

## 2023-01-06 PROCEDURE — 1101F PT FALLS ASSESS-DOCD LE1/YR: CPT | Performed by: NURSE PRACTITIONER

## 2023-01-06 PROCEDURE — G8417 CALC BMI ABV UP PARAM F/U: HCPCS | Performed by: NURSE PRACTITIONER

## 2023-01-06 PROCEDURE — 99214 OFFICE O/P EST MOD 30 MIN: CPT | Performed by: NURSE PRACTITIONER

## 2023-01-06 PROCEDURE — 3017F COLORECTAL CA SCREEN DOC REV: CPT | Performed by: NURSE PRACTITIONER

## 2023-01-06 PROCEDURE — 1123F ACP DISCUSS/DSCN MKR DOCD: CPT | Performed by: NURSE PRACTITIONER

## 2023-01-06 PROCEDURE — G8427 DOCREV CUR MEDS BY ELIG CLIN: HCPCS | Performed by: NURSE PRACTITIONER

## 2023-01-06 NOTE — PROGRESS NOTES
Dimas Yanez is a 67 y.o. female     Chief Complaint   Patient presents with    Diabetes     6wk        Visit Vitals  /72 (BP 1 Location: Left upper arm, BP Patient Position: Sitting, BP Cuff Size: Adult)   Pulse 66   Temp 98.4 °F (36.9 °C) (Oral)   Resp 16   Ht 5' 2\" (1.575 m)   Wt 183 lb 6.4 oz (83.2 kg)   LMP  (LMP Unknown)   SpO2 95%   BMI 33.54 kg/m²       Health Maintenance Due   Topic Date Due    DTaP/Tdap/Td series (1 - Tdap) Never done    Foot Exam Q1  01/11/2022    Diabetic Alb to Cr ratio (uACR) test  02/03/2023         1. \"Have you been to the ER, urgent care clinic since your last visit? Hospitalized since your last visit? \" No    2. \"Have you seen or consulted any other health care providers outside of the [address] [place] [address] since your last visit? \" No     3. For patients aged 39-70: Has the patient had a colonoscopy / FIT/ Cologuard? Yes - no Care Gap present      If the patient is female:    4. For patients aged 41-77: Has the patient had a mammogram within the past 2 years? Yes - no Care Gap present      5. For patients aged 21-65: Has the patient had a pap smear?  NA - based on age or sex

## 2023-01-06 NOTE — PROGRESS NOTES
Chief Complaint   Patient presents with    Diabetes     6wk        SUBJECTIVE:    Ivy Coleman is a 67 y.o. female who is here today for a follow up appointment regarding her type 2 diabetes with mixed hyperlipidemia, stage IIIa chronic renal insufficiency, hypothyroidism, and essential hypertension. She states she is feeling well overall, and denies any new or acute complaints at this time. At our last encounter, the patient was found to have a hemoglobin A1c that had elevated significantly to 11.2%. She was subsequently started on Januvia in addition to her current metformin. She states she has been tolerating this well and checking her blood sugars every morning. She states her blood sugars have been progressively coming down and now range between low 100s to 150 mg/dL. She states that her morning blood glucose today was 109 mg/dL. She is happy about the results. She denies any hypoglycemic reactions. She states she has been more watchful of her diet overall. She has been keeping a blood sugar diary, but forgot to bring it to her appointment today. She continues to take amlodipine and losartan for management of her hypertension. Her blood pressure appears well controlled at this time and stable. She denies any adverse side effects of the medication. She continues to take fenofibrate and rosuvastatin for management of her mixed hyperlipidemia. She denies any chest pain, chest pressure, shortness of breath, headaches, dizziness, blurred vision, palpitations, or syncope episodes. She continues to take levothyroxine daily as prescribed for management of her hypothyroidism and tolerating this well. Current Outpatient Medications   Medication Sig Dispense Refill    rosuvastatin (CRESTOR) 20 mg tablet TAKE 1 TABLET AT BEDTIME 90 Tablet 1    glucose blood VI test strips (True Metrix Glucose Test Strip) strip Check blood glucose levels before breakfast each day.  100 Strip 1 SITagliptin (JANUVIA) 100 mg tablet Take 1 Tablet by mouth daily. 90 Tablet 1    gabapentin (NEURONTIN) 300 mg capsule Take 1 capsule by mouth each morning, take 2 capsules at bedtime. 270 Capsule 1    fenofibrate (LOFIBRA) 160 mg tablet TAKE 1 TABLET BY MOUTH DAILY FOR HIGH CHOLESTEROL 90 Tablet 1    losartan (COZAAR) 100 mg tablet TAKE 1 TABLET DAILY 90 Tablet 1    metFORMIN ER (GLUCOPHAGE XR) 500 mg tablet TAKE 1 TABLET TWICE A DAY WITH MEALS 180 Tablet 1    levothyroxine (SYNTHROID) 88 mcg tablet TAKE 1 TABLET BY MOUTH EVERY DAY BEFORE BREAKFAST 90 Tablet 1    amLODIPine (NORVASC) 2.5 mg tablet Take 1 Tablet by mouth daily. FOR HIGH BLOOD PRESSURE 90 Tablet 1    clobetasoL (TEMOVATE) 0.05 % topical cream Apply  to affected area two (2) times a day. 30 g 2    silicones/castor oil (HAND PROTECTIVE EX) by Apply Externally route. Hand gel Keratoderma bare 40 gel cream      tazarotene (Arazlo) 0.045 % lotn by Apply Externally route. B.animalis,bifid,infantis,long 10-15 mg TbEC Take  by mouth. MULTIVITAMIN/IRON/FOLIC ACID (CENTRUM COMPLETE PO) Take  by mouth daily. Fit for me multivitamin      fluticasone propionate (FLONASE) 50 mcg/actuation nasal spray nightly. CETIRIZINE HCL (ZYRTEC PO) Take 10 mg by mouth daily. calcium citrate-vitamin D3 (CITRACAL WITH VITAMIN D MAXIMUM) tablet Take  by mouth two (2) times a day.  (Patient not taking: Reported on 1/6/2023)       Past Medical History:   Diagnosis Date    Abnormal kidney function 10/18/2017    Abnormal presence of protein in urine 10/18/2017    Acute serous otitis media of left ear 10/18/2017    Comments: recurrence not specified    Allergic rhinitis     Anemia     Anemia in chronic kidney disease 10/18/2017    Annual physical exam 10/18/2017    Back pain     Basal cell carcinoma     of tip of nose    Boil of trunk 10/18/2017    Bronchitis 10/18/2017    Carcinoma ([facility] [address] 10/18/2017    Comments: basal cell nose s/p excision    Chest pain, exertional 10/18/2017    Cough 10/18/2017    Dry mouth     Eczema 10/18/2017    Comments: intrinsic     History of cholecystectomy 10/18/2017    Comments: open 1989    HTN (hypertension) 10/18/2017    Kidney stone     LUIS FERNANDO (obstructive sleep apnea)     Osteopenia     Osteoporosis 10/18/2017    Other and unspecified hyperlipidemia     Pigmentary retinopathy     Type II or unspecified type diabetes mellitus without mention of complication, uncontrolled     Unspecified essential hypertension     Unspecified hypothyroidism     Unspecified vitamin D deficiency      Past Surgical History:   Procedure Laterality Date    COLONOSCOPY N/A 4/10/2018    COLONOSCOPY performed by Charly Buck MD at [facility] ENDOSCOPY    DELIVERY       x2    HX CHOLECYSTECTOMY      HX OTHER SURGICAL  3/10/16    gastric sleeve    HX POLYPECTOMY      HX TONSILLECTOMY       Allergies   Allergen Reactions    Niacin Other (comments)     Skin irritation    Sulfa (Sulfonamide Antibiotics) Unknown (comments)    Tricor [Fenofibrate Micronized] Swelling       REVIEW OF SYSTEMS:                                        POSITIVE= bold text  Negative = regular text    General:                     fever, chills, sweats, generalized weakness, weight loss/gain,                                       loss of appetite   Eyes:                           blurred vision, eye pain, loss of vision, double vision  ENT:                            rhinorrhea, pharyngitis   Respiratory:               cough, sputum production, SOB, WOLFF, wheezing, pleuritic pain   Cardiology:                chest pain, palpitations, orthopnea, PND, edema, syncope   Gastrointestinal:       abdominal pain , N/V, diarrhea, dysphagia, constipation, bleeding   Genitourinary:           frequency, urgency, dysuria, hematuria, incontinence   Muskuloskeletal :      arthralgia, myalgia, back pain  Hematology:              easy bruising, nose or gum bleeding, lymphadenopathy   Dermatological:         rash, ulceration, pruritis, color change / jaundice  Endocrine:                 hot flashes or polydipsia   Neurological:             headache, dizziness, confusion, focal weakness, paresthesia,                                      Speech difficulties, memory loss, gait difficulty  Psychological:          Feelings of anxiety, depression, agitation        Social History     Socioeconomic History    Marital status:    Tobacco Use    Smoking status: Never    Smokeless tobacco: Never   Vaping Use    Vaping Use: Never used   Substance and Sexual Activity    Alcohol use: Not Currently     Comment: maybe a few times a year at most    Drug use: No    Sexual activity: Not Currently   Social History Narrative    Lives in [place] with  of 44 years. Has one daughter and one son. Retired   at [employer]. Likes to read and play on the computer. Social Determinants of Health     Financial Resource Strain: Low Risk     Difficulty of Paying Living Expenses: Not hard at all   Food Insecurity: No Food Insecurity    Worried About Running Out of Food in the Last Year: Never true    Ran Out of Food in the Last Year: Never true     Family History   Problem Relation Age of Onset    Diabetes Father     Stroke Father     Cancer Mother         lung    Other Mother         aortic aneurysm       OBJECTIVE:     Visit Vitals  /72 (BP 1 Location: Left upper arm, BP Patient Position: Sitting, BP Cuff Size: Adult)   Pulse 66   Temp 98.4 °F (36.9 °C) (Oral)   Resp 16   Ht 5' 2\" (1.575 m)   Wt 183 lb 6.4 oz (83.2 kg)   LMP  (LMP Unknown)   SpO2 95%   BMI 33.54 kg/m²       Constitutional: She appears well nourished, of stated age, and dressed appropriately. Eyes: Sclera anicteric, PERRLA, EOMI  Neck: Supple without lymphadenopathy. Respiratory: Clear to ascultation X5, normal inspiratory effort, no adventitious breath sounds.   Cardiovascular: Regular rate and rhythm, no rubs or gallops, PMI not displaced, No thrills, no peripheral edema  Neuro: Non-focal exam, A & O X 3.   Psychiatric: Appropriate affect and demeanor, pleasant and cooperative. Patient's thought content and thought processing appear to be within normal limits. ASSESSMENT/PLAN:       ICD-10-CM ICD-9-CM    1. Type 2 diabetes mellitus with stage 3a chronic kidney disease, without long-term current use of insulin (HCC)  E11.22 250.40     N18.31 585.3       2. Mixed hyperlipidemia due to type 2 diabetes mellitus (HCC)  E11.69 250.80     E78.2 272.2       3. Essential hypertension  I10 401.9       4. Acquired hypothyroidism  E03.9 244.9       5. Class 1 obesity due to excess calories with serious comorbidity and body mass index (BMI) of 33.0 to 33.9 in adult  E66.09 278.00     Z68.33 V85.33         1: Patient to continue use of Januvia and metformin for management of type 2 diabetes. This appears to be working well. Continue to monitor blood sugars daily. 2: Patient to continue rosuvastatin and fenofibrate daily for management of mixed hyperlipidemia. Patient tolerating well. 3: Continue current medications for management of hypertension. Blood pressure appears stable and well controlled at this time. 4: Continue levothyroxine daily for management of hypothyroidism. Patient tolerating well. 5: Continue healthy lifestyle management with avoidance of concentrated sweets and excess carbohydrates. Exercise as tolerated. 6: Continue all other medications and supplements as desired. 7: Patient to follow-up with me in approximately 6 weeks for lab recheck and evaluation of medication. Patient states understanding and agrees with plan. ATTENTION:   This medical record was transcribed using an electronic medical records system. Although proofread, it may and can contain electronic and spelling errors. Other human spelling and other errors may be present. Corrections may be executed at a later time.   Please feel free to contact us for any clarifications as needed. Follow-up and Dispositions    Return in about 6 weeks (around 2/17/2023) for Follow up with fasting labs. Lei,  Jarvis Lilly.  WALDO Williamson APRN FNP-BC

## 2023-02-16 ENCOUNTER — LAB ONLY (OUTPATIENT)
Dept: INTERNAL MEDICINE CLINIC | Age: 73
End: 2023-02-16

## 2023-02-16 DIAGNOSIS — Z79.899 ON STATIN THERAPY: ICD-10-CM

## 2023-02-16 DIAGNOSIS — E11.9 TYPE 2 DIABETES MELLITUS WITHOUT COMPLICATION, WITHOUT LONG-TERM CURRENT USE OF INSULIN (HCC): Primary | ICD-10-CM

## 2023-02-16 DIAGNOSIS — N18.31 TYPE 2 DIABETES MELLITUS WITH STAGE 3A CHRONIC KIDNEY DISEASE, WITHOUT LONG-TERM CURRENT USE OF INSULIN (HCC): ICD-10-CM

## 2023-02-16 DIAGNOSIS — I10 ESSENTIAL HYPERTENSION: ICD-10-CM

## 2023-02-16 DIAGNOSIS — E11.22 TYPE 2 DIABETES MELLITUS WITH STAGE 3A CHRONIC KIDNEY DISEASE, WITHOUT LONG-TERM CURRENT USE OF INSULIN (HCC): ICD-10-CM

## 2023-02-16 DIAGNOSIS — E11.69 MIXED HYPERLIPIDEMIA DUE TO TYPE 2 DIABETES MELLITUS (HCC): ICD-10-CM

## 2023-02-16 DIAGNOSIS — E03.9 ACQUIRED HYPOTHYROIDISM: ICD-10-CM

## 2023-02-16 DIAGNOSIS — E78.2 MIXED HYPERLIPIDEMIA DUE TO TYPE 2 DIABETES MELLITUS (HCC): ICD-10-CM

## 2023-02-17 LAB
ALBUMIN SERPL-MCNC: 4.1 G/DL (ref 3.5–5)
ALBUMIN/GLOB SERPL: 1.3 (ref 1.1–2.2)
ALP SERPL-CCNC: 66 U/L (ref 45–117)
ALT SERPL-CCNC: 26 U/L (ref 12–78)
ANION GAP SERPL CALC-SCNC: 7 MMOL/L (ref 5–15)
AST SERPL-CCNC: 23 U/L (ref 15–37)
BILIRUB SERPL-MCNC: 0.3 MG/DL (ref 0.2–1)
BUN SERPL-MCNC: 23 MG/DL (ref 6–20)
BUN/CREAT SERPL: 17 (ref 12–20)
CALCIUM SERPL-MCNC: 9.6 MG/DL (ref 8.5–10.1)
CHLORIDE SERPL-SCNC: 107 MMOL/L (ref 97–108)
CHOLEST SERPL-MCNC: 152 MG/DL
CK SERPL-CCNC: 79 U/L (ref 26–192)
CO2 SERPL-SCNC: 29 MMOL/L (ref 21–32)
CREAT SERPL-MCNC: 1.38 MG/DL (ref 0.55–1.02)
CREAT UR-MCNC: 79.8 MG/DL
ERYTHROCYTE [DISTWIDTH] IN BLOOD BY AUTOMATED COUNT: 13.1 % (ref 11.5–14.5)
EST. AVERAGE GLUCOSE BLD GHB EST-MCNC: 148 MG/DL
GLOBULIN SER CALC-MCNC: 3.2 G/DL (ref 2–4)
GLUCOSE SERPL-MCNC: 142 MG/DL (ref 65–100)
HBA1C MFR BLD: 6.8 % (ref 4–5.6)
HCT VFR BLD AUTO: 37.8 % (ref 35–47)
HDLC SERPL-MCNC: 36 MG/DL
HDLC SERPL: 4.2 (ref 0–5)
HGB BLD-MCNC: 12 G/DL (ref 11.5–16)
LDLC SERPL CALC-MCNC: 72.8 MG/DL (ref 0–100)
MCH RBC QN AUTO: 29.6 PG (ref 26–34)
MCHC RBC AUTO-ENTMCNC: 31.7 G/DL (ref 30–36.5)
MCV RBC AUTO: 93.3 FL (ref 80–99)
MICROALBUMIN UR-MCNC: 1.55 MG/DL
MICROALBUMIN/CREAT UR-RTO: 19 MG/G (ref 0–30)
NRBC # BLD: 0 K/UL (ref 0–0.01)
NRBC BLD-RTO: 0 PER 100 WBC
PLATELET # BLD AUTO: 224 K/UL (ref 150–400)
PMV BLD AUTO: 11.6 FL (ref 8.9–12.9)
POTASSIUM SERPL-SCNC: 3.8 MMOL/L (ref 3.5–5.1)
PROT SERPL-MCNC: 7.3 G/DL (ref 6.4–8.2)
RBC # BLD AUTO: 4.05 M/UL (ref 3.8–5.2)
SODIUM SERPL-SCNC: 143 MMOL/L (ref 136–145)
T4 FREE SERPL-MCNC: 1.5 NG/DL (ref 0.8–1.5)
TRIGL SERPL-MCNC: 216 MG/DL (ref ?–150)
TSH SERPL DL<=0.05 MIU/L-ACNC: 0.4 UIU/ML (ref 0.36–3.74)
VLDLC SERPL CALC-MCNC: 43.2 MG/DL
WBC # BLD AUTO: 7.7 K/UL (ref 3.6–11)

## 2023-02-17 NOTE — PROGRESS NOTES
Thyroid level in good range. Cholesterol looks in good range. Hemoglobin A1c is now back in healthy range at 6.8%. Urine microalbumin shows no significant leakage of protein through kidneys. Metabolic panel shows slight improvement in kidney function. Avoid NSAIDs.     Blood counts are normal.

## 2023-02-27 ENCOUNTER — TELEPHONE (OUTPATIENT)
Dept: INTERNAL MEDICINE CLINIC | Age: 73
End: 2023-02-27

## 2023-02-27 DIAGNOSIS — J01.90 ACUTE SINUSITIS, RECURRENCE NOT SPECIFIED, UNSPECIFIED LOCATION: Primary | ICD-10-CM

## 2023-02-27 RX ORDER — CEFUROXIME AXETIL 500 MG/1
500 TABLET ORAL 2 TIMES DAILY
Qty: 14 TABLET | Refills: 0 | Status: SHIPPED | OUTPATIENT
Start: 2023-02-27

## 2023-02-27 NOTE — TELEPHONE ENCOUNTER
Pt reports she has a sinus infection that started Saturday. She states when she blows her nose it's green and has blood in it. She denies facial pressure/pain/headache. She denies fever/bodyaches/chills. She has experienced intermittent weakness, usually a few minutes after taking a shower. She has not taken her blood pressure. Pt would like antibiotics sent to the [facility] on file. Pt would like a message left if she does not answer.

## 2023-03-02 ENCOUNTER — OFFICE VISIT (OUTPATIENT)
Dept: INTERNAL MEDICINE CLINIC | Age: 73
End: 2023-03-02
Payer: MEDICARE

## 2023-03-02 VITALS
RESPIRATION RATE: 16 BRPM | BODY MASS INDEX: 32.94 KG/M2 | DIASTOLIC BLOOD PRESSURE: 72 MMHG | OXYGEN SATURATION: 97 % | WEIGHT: 179 LBS | TEMPERATURE: 98.2 F | SYSTOLIC BLOOD PRESSURE: 130 MMHG | HEART RATE: 63 BPM | HEIGHT: 62 IN

## 2023-03-02 DIAGNOSIS — E03.9 ACQUIRED HYPOTHYROIDISM: ICD-10-CM

## 2023-03-02 DIAGNOSIS — E66.09 CLASS 1 OBESITY DUE TO EXCESS CALORIES WITH SERIOUS COMORBIDITY AND BODY MASS INDEX (BMI) OF 32.0 TO 32.9 IN ADULT: ICD-10-CM

## 2023-03-02 DIAGNOSIS — I10 ESSENTIAL HYPERTENSION: ICD-10-CM

## 2023-03-02 DIAGNOSIS — E11.22 TYPE 2 DIABETES MELLITUS WITH STAGE 3B CHRONIC KIDNEY DISEASE, WITHOUT LONG-TERM CURRENT USE OF INSULIN (HCC): Primary | ICD-10-CM

## 2023-03-02 DIAGNOSIS — Z79.899 ON STATIN THERAPY: ICD-10-CM

## 2023-03-02 DIAGNOSIS — E11.69 MIXED HYPERLIPIDEMIA DUE TO TYPE 2 DIABETES MELLITUS (HCC): ICD-10-CM

## 2023-03-02 DIAGNOSIS — N18.32 TYPE 2 DIABETES MELLITUS WITH STAGE 3B CHRONIC KIDNEY DISEASE, WITHOUT LONG-TERM CURRENT USE OF INSULIN (HCC): Primary | ICD-10-CM

## 2023-03-02 DIAGNOSIS — E78.2 MIXED HYPERLIPIDEMIA DUE TO TYPE 2 DIABETES MELLITUS (HCC): ICD-10-CM

## 2023-03-02 PROBLEM — N18.31 TYPE 2 DIABETES MELLITUS WITH STAGE 3A CHRONIC KIDNEY DISEASE, WITHOUT LONG-TERM CURRENT USE OF INSULIN (HCC): Status: RESOLVED | Noted: 2019-12-30 | Resolved: 2023-03-02

## 2023-03-02 PROBLEM — E66.811 CLASS 1 OBESITY DUE TO EXCESS CALORIES WITH SERIOUS COMORBIDITY AND BODY MASS INDEX (BMI) OF 32.0 TO 32.9 IN ADULT: Status: ACTIVE | Noted: 2022-11-07

## 2023-03-02 PROCEDURE — 2022F DILAT RTA XM EVC RTNOPTHY: CPT | Performed by: NURSE PRACTITIONER

## 2023-03-02 PROCEDURE — 1090F PRES/ABSN URINE INCON ASSESS: CPT | Performed by: NURSE PRACTITIONER

## 2023-03-02 PROCEDURE — G8432 DEP SCR NOT DOC, RNG: HCPCS | Performed by: NURSE PRACTITIONER

## 2023-03-02 PROCEDURE — 3075F SYST BP GE 130 - 139MM HG: CPT | Performed by: NURSE PRACTITIONER

## 2023-03-02 PROCEDURE — 3078F DIAST BP <80 MM HG: CPT | Performed by: NURSE PRACTITIONER

## 2023-03-02 PROCEDURE — 3017F COLORECTAL CA SCREEN DOC REV: CPT | Performed by: NURSE PRACTITIONER

## 2023-03-02 PROCEDURE — 1101F PT FALLS ASSESS-DOCD LE1/YR: CPT | Performed by: NURSE PRACTITIONER

## 2023-03-02 PROCEDURE — G9899 SCRN MAM PERF RSLTS DOC: HCPCS | Performed by: NURSE PRACTITIONER

## 2023-03-02 PROCEDURE — G8536 NO DOC ELDER MAL SCRN: HCPCS | Performed by: NURSE PRACTITIONER

## 2023-03-02 PROCEDURE — 1123F ACP DISCUSS/DSCN MKR DOCD: CPT | Performed by: NURSE PRACTITIONER

## 2023-03-02 PROCEDURE — G8417 CALC BMI ABV UP PARAM F/U: HCPCS | Performed by: NURSE PRACTITIONER

## 2023-03-02 PROCEDURE — G8427 DOCREV CUR MEDS BY ELIG CLIN: HCPCS | Performed by: NURSE PRACTITIONER

## 2023-03-02 PROCEDURE — 99214 OFFICE O/P EST MOD 30 MIN: CPT | Performed by: NURSE PRACTITIONER

## 2023-03-02 PROCEDURE — 3044F HG A1C LEVEL LT 7.0%: CPT | Performed by: NURSE PRACTITIONER

## 2023-03-02 NOTE — PROGRESS NOTES
Elda Gallegos is a 67 y.o. female     Chief Complaint   Patient presents with    Diabetes     6wk       Visit Vitals  /72 (BP 1 Location: Left upper arm, BP Patient Position: Sitting, BP Cuff Size: Adult)   Pulse 63   Temp 98.2 °F (36.8 °C) (Oral)   Resp 16   Ht 5' 2\" (1.575 m)   Wt 179 lb (81.2 kg)   LMP  (LMP Unknown)   SpO2 97%   BMI 32.74 kg/m²       Health Maintenance Due   Topic Date Due    DTaP/Tdap/Td series (1 - Tdap) Never done    Foot Exam Q1  01/11/2022         1. \"Have you been to the ER, urgent care clinic since your last visit? Hospitalized since your last visit? \" No    2. \"Have you seen or consulted any other health care providers outside of the [address] [place] [address] since your last visit? \" No     3. For patients aged 39-70: Has the patient had a colonoscopy / FIT/ Cologuard? Yes - no Care Gap present      If the patient is female:    4. For patients aged 41-77: Has the patient had a mammogram within the past 2 years? Yes - no Care Gap present      5. For patients aged 21-65: Has the patient had a pap smear?  NA - based on age or sex

## 2023-03-02 NOTE — PROGRESS NOTES
Chief Complaint   Patient presents with    Diabetes     6wk       SUBJECTIVE:    Stephon Jean is a 67 y.o. female who is here today for a follow up appointment regarding current medical conditions including: Type 2 diabetes with stage IIIb chronic kidney disease and mixed hyperlipidemia, essential hypertension, hypothyroidism, obesity, and long-term use of statin therapy. She states she is feeling well overall, and denies any new or acute complaints. She states her sinus infection symptoms have been gradually resolving with current use of antibiotics. She states she is feeling much better overall. The patient continues to use combination of Januvia and metformin for management of her type 2 diabetes. She has been compliant with the medication. She had a significant A1c dropped from 11.2% to 6.8% on last check. She is happy about this. She denies any adverse side effects of the medication. She continues to use rosuvastatin 20 mg and fenofibrate 160 mg daily for management of her mixed hyperlipidemia and tolerating this well. She is currently on a combination of losartan 100 mg daily and amlodipine 2.5 mg daily for management of her hypertension. Her blood pressure appears well controlled at this time. She denies any recent episodes of chest pain, chest pressure, shortness of breath, headaches, blurred vision, palpitations, or syncope episodes. She continues to use levothyroxine 88 mcg daily for management of her hypothyroidism. Current Outpatient Medications   Medication Sig Dispense Refill    cefUROXime (CEFTIN) 500 mg tablet Take 1 Tablet by mouth two (2) times a day.  14 Tablet 0    losartan (COZAAR) 100 mg tablet TAKE 1 TABLET DAILY 90 Tablet 1    metFORMIN ER (GLUCOPHAGE XR) 500 mg tablet TAKE 1 TABLET TWICE A DAY WITH MEALS 180 Tablet 1    levothyroxine (SYNTHROID) 88 mcg tablet TAKE 1 TABLET DAILY BEFORE BREAKFAST 90 Tablet 1    amLODIPine (NORVASC) 2.5 mg tablet TAKE 1 TABLET DAILY FOR HIGH BLOOD PRESSURE 90 Tablet 1    rosuvastatin (CRESTOR) 20 mg tablet TAKE 1 TABLET AT BEDTIME 90 Tablet 1    glucose blood VI test strips (True Metrix Glucose Test Strip) strip Check blood glucose levels before breakfast each day. 100 Strip 1    SITagliptin (JANUVIA) 100 mg tablet Take 1 Tablet by mouth daily. 90 Tablet 1    gabapentin (NEURONTIN) 300 mg capsule Take 1 capsule by mouth each morning, take 2 capsules at bedtime. 270 Capsule 1    fenofibrate (LOFIBRA) 160 mg tablet TAKE 1 TABLET BY MOUTH DAILY FOR HIGH CHOLESTEROL 90 Tablet 1    clobetasoL (TEMOVATE) 0.05 % topical cream Apply  to affected area two (2) times a day. 30 g 2    silicones/castor oil (HAND PROTECTIVE EX) by Apply Externally route. Hand gel Keratoderma bare 40 gel cream      tazarotene (Arazlo) 0.045 % lotn by Apply Externally route. B.animalis,bifid,infantis,long 10-15 mg TbEC Take  by mouth. MULTIVITAMIN/IRON/FOLIC ACID (CENTRUM COMPLETE PO) Take  by mouth daily. Fit for me multivitamin      fluticasone propionate (FLONASE) 50 mcg/actuation nasal spray nightly. CETIRIZINE HCL (ZYRTEC PO) Take 10 mg by mouth daily.        Past Medical History:   Diagnosis Date    Abnormal kidney function 10/18/2017    Abnormal presence of protein in urine 10/18/2017    Acute serous otitis media of left ear 10/18/2017    Comments: recurrence not specified    Allergic rhinitis     Anemia     Anemia in chronic kidney disease 10/18/2017    Annual physical exam 10/18/2017    Back pain     Basal cell carcinoma     of tip of nose    Boil of trunk 10/18/2017    Bronchitis 10/18/2017    Carcinoma ([address] 10/18/2017    Comments: basal cell nose s/p excision    Chest pain, exertional 10/18/2017    Cough 10/18/2017    Dry mouth     Eczema 10/18/2017    Comments: intrinsic     History of cholecystectomy 10/18/2017    Comments: open 1989    HTN (hypertension) 10/18/2017    Kidney stone     LUIS FERNANDO (obstructive sleep apnea)     Osteopenia     Osteoporosis 10/18/2017    Other and unspecified hyperlipidemia     Pigmentary retinopathy     Type II or unspecified type diabetes mellitus without mention of complication, uncontrolled     Unspecified essential hypertension     Unspecified hypothyroidism     Unspecified vitamin D deficiency      Past Surgical History:   Procedure Laterality Date    COLONOSCOPY N/A 4/10/2018    COLONOSCOPY performed by Bella Mercado MD at [facility] ENDOSCOPY    DELIVERY       x2    HX CHOLECYSTECTOMY      HX OTHER SURGICAL  3/10/16    gastric sleeve    HX POLYPECTOMY      HX TONSILLECTOMY       Allergies   Allergen Reactions    Niacin Other (comments)     Skin irritation    Sulfa (Sulfonamide Antibiotics) Unknown (comments)       REVIEW OF SYSTEMS:                                        POSITIVE= bold text  Negative = regular text    General:                     fever, chills, sweats, generalized weakness, weight loss/gain,                                       loss of appetite   Eyes:                           blurred vision, eye pain, loss of vision, double vision  ENT:                            rhinorrhea, pharyngitis   Respiratory:               cough, sputum production, SOB, WOLFF, wheezing, pleuritic pain   Cardiology:                chest pain, palpitations, orthopnea, PND, edema, syncope   Gastrointestinal:       abdominal pain , N/V, diarrhea, dysphagia, constipation, bleeding   Genitourinary:           frequency, urgency, dysuria, hematuria, incontinence   Muskuloskeletal :      arthralgia, myalgia, back pain  Hematology:              easy bruising, nose or gum bleeding, lymphadenopathy   Dermatological:         rash, ulceration, pruritis, color change / jaundice  Endocrine:                 hot flashes or polydipsia   Neurological:             headache, dizziness, confusion, focal weakness, paresthesia,                                      Speech difficulties, memory loss, gait difficulty  Psychological:          Feelings of anxiety, depression, agitation        Social History     Socioeconomic History    Marital status:    Tobacco Use    Smoking status: Never    Smokeless tobacco: Never   Vaping Use    Vaping Use: Never used   Substance and Sexual Activity    Alcohol use: Not Currently     Comment: maybe a few times a year at most    Drug use: No    Sexual activity: Not Currently   Social History [place]    Lives in [place] with  of 44 years. Has one daughter and one son. Retired   at [employer]. Likes to read and play on the computer. Social Determinants of Health     Financial Resource Strain: Low Risk     Difficulty of Paying Living Expenses: Not hard at all   Food Insecurity: No Food Insecurity    Worried About Running Out of Food in the Last Year: Never true    Ran Out of Food in the Last Year: Never true     Family History   Problem Relation Age of Onset    Diabetes Father     Stroke Father     Cancer Mother         lung    Other Mother         aortic aneurysm       OBJECTIVE:     Visit Vitals  /72 (BP 1 Location: Left upper arm, BP Patient Position: Sitting, BP Cuff Size: Adult)   Pulse 63   Temp 98.2 °F (36.8 °C) (Oral)   Resp 16   Ht 5' 2\" (1.575 m)   Wt 179 lb (81.2 kg)   LMP  (LMP Unknown)   SpO2 97%   BMI 32.74 kg/m²       Constitutional: She appears well nourished, of stated age, and dressed appropriately. Eyes: Sclera anicteric, PERRLA, EOMI  Neck: Supple without lymphadenopathy. Thyroid normal, No JVD or bruits  Respiratory: Clear to ascultation X5, normal inspiratory effort, no adventitious breath sounds. Cardiovascular: Regular rate and rhythm, 2/6 systolic murmur, but without rubs or gallops, PMI not displaced, No thrills. Neuro: Non-focal exam, A & O X 3.   Psychiatric: Appropriate affect and demeanor, pleasant and cooperative. Patient's thought content and thought processing appear to be within normal limits.     ASSESSMENT/PLAN: ICD-10-CM ICD-9-CM    1. Type 2 diabetes mellitus with stage 3b chronic kidney disease, without long-term current use of insulin (HCC)  E11.22 250.40     N18.32 585.3       2. Mixed hyperlipidemia due to type 2 diabetes mellitus (HCC)  E11.69 250.80     E78.2 272.2       3. Essential hypertension  I10 401.9       4. Acquired hypothyroidism  E03.9 244.9       5. Class 1 obesity due to excess calories with serious comorbidity and body mass index (BMI) of 32.0 to 32.9 in adult  E66.09 278.00     Z68.32 V85.32       6. On statin therapy  Z79.899 V58.69         1: Patient to continue current regimen for management of type 2 diabetes. These appear to be working well and diabetes is stable at this time. Continue to monitor blood sugars accordingly. 2: Continue rosuvastatin and fenofibrate for management of mixed hyperlipidemia. 3: Continue losartan and amlodipine for management of hypertension. Blood pressure appears stable and well controlled. 4: Continue levothyroxine daily for management of hypothyroidism. Patient tolerating well. 5: Continue healthy lifestyle management with appropriate dietary intake with avoidance of concentrated sweets and excess carbohydrates. Exercise regularly for weight loss. 6: Continue all other medications as prescribed. 7: Patient to follow-up with me in approximately 4 months, or sooner as needed. Patient states understanding and agrees with plan. ATTENTION:   This medical record was transcribed using an electronic medical records system. Although proofread, it may and can contain electronic and spelling errors. Other human spelling and other errors may be present. Corrections may be executed at a later time. Please feel free to contact us for any clarifications as needed. Follow-up and Dispositions    Return in about 4 months (around 7/2/2023) for Follow up with fasting labs. Signed,  Juan David Adjutant.  Melvi Cedillo, MSN APRN FNP-BC

## 2023-03-09 ENCOUNTER — TRANSCRIBE ORDER (OUTPATIENT)
Dept: SCHEDULING | Age: 73
End: 2023-03-09

## 2023-03-09 DIAGNOSIS — Z12.31 SCREENING MAMMOGRAM FOR HIGH-RISK PATIENT: Primary | ICD-10-CM

## 2023-04-05 ENCOUNTER — HOSPITAL ENCOUNTER (OUTPATIENT)
Dept: MAMMOGRAPHY | Age: 73
End: 2023-04-05
Attending: NURSE PRACTITIONER
Payer: MEDICARE

## 2023-04-05 PROCEDURE — 77067 SCR MAMMO BI INCL CAD: CPT

## 2023-04-19 DIAGNOSIS — E11.69 MIXED HYPERLIPIDEMIA DUE TO TYPE 2 DIABETES MELLITUS (HCC): ICD-10-CM

## 2023-04-19 DIAGNOSIS — E11.22 TYPE 2 DIABETES MELLITUS WITH STAGE 3A CHRONIC KIDNEY DISEASE, WITHOUT LONG-TERM CURRENT USE OF INSULIN (HCC): ICD-10-CM

## 2023-04-19 DIAGNOSIS — N18.31 TYPE 2 DIABETES MELLITUS WITH STAGE 3A CHRONIC KIDNEY DISEASE, WITHOUT LONG-TERM CURRENT USE OF INSULIN (HCC): ICD-10-CM

## 2023-04-19 DIAGNOSIS — E78.2 MIXED HYPERLIPIDEMIA DUE TO TYPE 2 DIABETES MELLITUS (HCC): ICD-10-CM

## 2023-04-19 RX ORDER — FENOFIBRATE 160 MG/1
TABLET ORAL
Qty: 90 TABLET | Refills: 1 | Status: SHIPPED | OUTPATIENT
Start: 2023-04-19

## 2023-04-19 RX ORDER — SITAGLIPTIN 100 MG/1
TABLET, FILM COATED ORAL
Qty: 90 TABLET | Refills: 1 | Status: SHIPPED | OUTPATIENT
Start: 2023-04-19

## 2023-04-22 DIAGNOSIS — Z12.31 SCREENING MAMMOGRAM FOR HIGH-RISK PATIENT: Primary | ICD-10-CM

## 2023-05-06 SDOH — ECONOMIC STABILITY: INCOME INSECURITY: HOW HARD IS IT FOR YOU TO PAY FOR THE VERY BASICS LIKE FOOD, HOUSING, MEDICAL CARE, AND HEATING?: NOT HARD AT ALL

## 2023-05-06 SDOH — ECONOMIC STABILITY: HOUSING INSECURITY
IN THE LAST 12 MONTHS, WAS THERE A TIME WHEN YOU DID NOT HAVE A STEADY PLACE TO SLEEP OR SLEPT IN A SHELTER (INCLUDING NOW)?: NO

## 2023-05-06 SDOH — HEALTH STABILITY: PHYSICAL HEALTH: ON AVERAGE, HOW MANY MINUTES DO YOU ENGAGE IN EXERCISE AT THIS LEVEL?: PATIENT DECLINED

## 2023-05-06 SDOH — ECONOMIC STABILITY: TRANSPORTATION INSECURITY
IN THE PAST 12 MONTHS, HAS LACK OF TRANSPORTATION KEPT YOU FROM MEETINGS, WORK, OR FROM GETTING THINGS NEEDED FOR DAILY LIVING?: NO

## 2023-05-06 SDOH — ECONOMIC STABILITY: FOOD INSECURITY: WITHIN THE PAST 12 MONTHS, THE FOOD YOU BOUGHT JUST DIDN'T LAST AND YOU DIDN'T HAVE MONEY TO GET MORE.: NEVER TRUE

## 2023-05-06 SDOH — HEALTH STABILITY: PHYSICAL HEALTH
ON AVERAGE, HOW MANY DAYS PER WEEK DO YOU ENGAGE IN MODERATE TO STRENUOUS EXERCISE (LIKE A BRISK WALK)?: PATIENT DECLINED

## 2023-05-06 SDOH — ECONOMIC STABILITY: FOOD INSECURITY: WITHIN THE PAST 12 MONTHS, YOU WORRIED THAT YOUR FOOD WOULD RUN OUT BEFORE YOU GOT MONEY TO BUY MORE.: NEVER TRUE

## 2023-05-06 ASSESSMENT — LIFESTYLE VARIABLES
HOW OFTEN DO YOU HAVE SIX OR MORE DRINKS ON ONE OCCASION: 1
HOW MANY STANDARD DRINKS CONTAINING ALCOHOL DO YOU HAVE ON A TYPICAL DAY: PATIENT DOES NOT DRINK
HOW OFTEN DO YOU HAVE A DRINK CONTAINING ALCOHOL: PATIENT DECLINED
HOW OFTEN DO YOU HAVE A DRINK CONTAINING ALCOHOL: 98
HOW MANY STANDARD DRINKS CONTAINING ALCOHOL DO YOU HAVE ON A TYPICAL DAY: 0

## 2023-05-06 ASSESSMENT — PATIENT HEALTH QUESTIONNAIRE - PHQ9
SUM OF ALL RESPONSES TO PHQ QUESTIONS 1-9: 0
1. LITTLE INTEREST OR PLEASURE IN DOING THINGS: 0
SUM OF ALL RESPONSES TO PHQ QUESTIONS 1-9: 0
SUM OF ALL RESPONSES TO PHQ QUESTIONS 1-9: 0
2. FEELING DOWN, DEPRESSED OR HOPELESS: 0
SUM OF ALL RESPONSES TO PHQ QUESTIONS 1-9: 0
SUM OF ALL RESPONSES TO PHQ9 QUESTIONS 1 & 2: 0

## 2023-05-08 ENCOUNTER — OFFICE VISIT (OUTPATIENT)
Facility: CLINIC | Age: 73
End: 2023-05-08

## 2023-05-08 VITALS
BODY MASS INDEX: 33.05 KG/M2 | HEART RATE: 65 BPM | TEMPERATURE: 98.5 F | HEIGHT: 62 IN | SYSTOLIC BLOOD PRESSURE: 130 MMHG | OXYGEN SATURATION: 96 % | RESPIRATION RATE: 16 BRPM | WEIGHT: 179.6 LBS | DIASTOLIC BLOOD PRESSURE: 74 MMHG

## 2023-05-08 DIAGNOSIS — N18.32 TYPE 2 DIABETES MELLITUS WITH STAGE 3B CHRONIC KIDNEY DISEASE, WITHOUT LONG-TERM CURRENT USE OF INSULIN (HCC): Primary | ICD-10-CM

## 2023-05-08 DIAGNOSIS — E66.09 CLASS 1 OBESITY DUE TO EXCESS CALORIES WITH SERIOUS COMORBIDITY AND BODY MASS INDEX (BMI) OF 32.0 TO 32.9 IN ADULT: ICD-10-CM

## 2023-05-08 DIAGNOSIS — E03.9 ACQUIRED HYPOTHYROIDISM: ICD-10-CM

## 2023-05-08 DIAGNOSIS — I10 ESSENTIAL (PRIMARY) HYPERTENSION: ICD-10-CM

## 2023-05-08 DIAGNOSIS — E11.22 TYPE 2 DIABETES MELLITUS WITH STAGE 3B CHRONIC KIDNEY DISEASE, WITHOUT LONG-TERM CURRENT USE OF INSULIN (HCC): Primary | ICD-10-CM

## 2023-05-08 DIAGNOSIS — E11.69 MIXED HYPERLIPIDEMIA DUE TO TYPE 2 DIABETES MELLITUS (HCC): ICD-10-CM

## 2023-05-08 DIAGNOSIS — E78.2 MIXED HYPERLIPIDEMIA DUE TO TYPE 2 DIABETES MELLITUS (HCC): ICD-10-CM

## 2023-05-08 RX ORDER — CALCIUM CITRATE/VITAMIN D3 200MG-6.25
1 TABLET ORAL DAILY
Qty: 100 EACH | Refills: 3 | Status: SHIPPED | OUTPATIENT
Start: 2023-05-08

## 2023-05-08 SDOH — ECONOMIC STABILITY: FOOD INSECURITY: WITHIN THE PAST 12 MONTHS, THE FOOD YOU BOUGHT JUST DIDN'T LAST AND YOU DIDN'T HAVE MONEY TO GET MORE.: NEVER TRUE

## 2023-05-08 SDOH — ECONOMIC STABILITY: FOOD INSECURITY: WITHIN THE PAST 12 MONTHS, YOU WORRIED THAT YOUR FOOD WOULD RUN OUT BEFORE YOU GOT MONEY TO BUY MORE.: NEVER TRUE

## 2023-05-08 SDOH — ECONOMIC STABILITY: INCOME INSECURITY: HOW HARD IS IT FOR YOU TO PAY FOR THE VERY BASICS LIKE FOOD, HOUSING, MEDICAL CARE, AND HEATING?: NOT HARD AT ALL

## 2023-05-08 ASSESSMENT — PATIENT HEALTH QUESTIONNAIRE - PHQ9
SUM OF ALL RESPONSES TO PHQ QUESTIONS 1-9: 0
SUM OF ALL RESPONSES TO PHQ QUESTIONS 1-9: 0
1. LITTLE INTEREST OR PLEASURE IN DOING THINGS: 0
SUM OF ALL RESPONSES TO PHQ QUESTIONS 1-9: 0
2. FEELING DOWN, DEPRESSED OR HOPELESS: 0
SUM OF ALL RESPONSES TO PHQ QUESTIONS 1-9: 0
SUM OF ALL RESPONSES TO PHQ9 QUESTIONS 1 & 2: 0

## 2023-05-08 NOTE — PROGRESS NOTES
Marybel Villareal is a 67 y.o. female     Chief Complaint   Patient presents with    Medicare AWV       /74 (Site: Left Upper Arm, Position: Sitting, Cuff Size: Large Adult)   Pulse 65   Temp 98.5 °F (36.9 °C) (Oral)   Resp 16   Ht 5' 2\" (1.575 m)   Wt 179 lb 9.6 oz (81.5 kg)   SpO2 96%   BMI 32.85 kg/m²     Health Maintenance Due   Topic Date Due    DTaP/Tdap/Td vaccine (1 - Tdap) Never done    Diabetic foot exam  01/11/2022    Annual Wellness Visit (AWV)  05/05/2023    Diabetic retinal exam  06/02/2023         1. \"Have you been to the ER, urgent care clinic since your last visit? Hospitalized since your last visit? \" No     2. \"Have you seen or consulted any other health care providers outside of the [address] [place] [address] since your last visit? \" Yes seen Dermatologist.     3. For patients aged 39-70: Has the patient had a colonoscopy / FIT/ Cologuard? Yes      If the patient is female:    4. For patients aged 41-77: Has the patient had a mammogram within the past 2 years? Yes      5. For patients aged 21-65: Has the patient had a pap smear?  NA

## 2023-05-11 NOTE — PROGRESS NOTES
Medicare Annual Wellness Visit    Cydney Gross is here for Medicare AWV    Assessment & Plan   Type 2 diabetes mellitus with stage 3b chronic kidney disease, without long-term current use of insulin ([facility])  -     blood glucose test strips (TRUE METRIX BLOOD GLUCOSE TEST) strip; 1 each by Does not apply route daily Check blood glucose level each morning., Disp-100 each, R-3Normal  Mixed hyperlipidemia due to type 2 diabetes mellitus ([facility] [address]  Essential (primary) hypertension  Acquired hypothyroidism  Class 1 obesity due to excess calories with serious comorbidity and body mass index (BMI) of 32.0 to 32.9 in adult      Recommendations for Preventive Services Due: see orders and patient instructions/AVS.  Recommended screening schedule for the next 5-10 years is provided to the patient in written form: see Patient Instructions/AVS.     Return in about 4 months (around 9/8/2023) for Follow up, fasting labs. Subjective   The following acute and/or chronic problems were also addressed today including: Type 2 diabetes with stage IIIb chronic kidney disease, mixed hyperlipidemia, hypothyroidism, and hypertension. She states she is feeling well overall, and has been taking her medication as prescribed. She denies any adverse side effects. Additionally, she denies any recent episodes of chest pain, chest pressure, shortness of breath, headaches, dizziness, blurred vision, palpitations, or syncope episodes. Patient's complete Health Risk Assessment and screening values have been reviewed and are found in Flowsheets. The following problems were reviewed today and where indicated follow up appointments were made and/or referrals ordered.     Positive Risk Factor Screenings with Interventions:                 Weight and Activity:  Physical Activity: Unknown    Days of Exercise per Week: Patient refused    Minutes of Exercise per Session: Patient refused     On average, how many days per week do you engage in moderate to

## 2023-06-16 RX ORDER — ROSUVASTATIN CALCIUM 20 MG/1
TABLET, COATED ORAL
Qty: 90 TABLET | Refills: 1 | Status: SHIPPED | OUTPATIENT
Start: 2023-06-16

## 2023-06-16 NOTE — TELEPHONE ENCOUNTER
PCP: MARCIN Rivas NP    Last appt: 5/8/2023  Future Appointments   Date Time Provider [address]   7/11/2023  8:30 AM MARCIN Rivas NP [facility] BS AMB       Requested Prescriptions     Pending Prescriptions Disp Refills    rosuvastatin (CRESTOR) 20 MG tablet [Pharmacy Med Name: ROSUVASTATIN TABS 20MG] 90 tablet 3     Sig: TAKE 1 TABLET AT BEDTIME       Prior labs and Blood pressures:  BP Readings from Last 3 Encounters:   05/08/23 130/74   03/02/23 130/72   01/06/23 132/72     Lab Results   Component Value Date/Time     02/16/2023 08:29 AM    K 3.8 02/16/2023 08:29 AM     02/16/2023 08:29 AM    CO2 29 02/16/2023 08:29 AM    BUN 23 02/16/2023 08:29 AM    GFRAA 42 05/04/2022 09:33 AM     No results found for: HBA1C, [id number]  Lab Results   Component Value Date/Time    CHOL 152 02/16/2023 08:29 AM    HDL 36 02/16/2023 08:29 AM    VLDL 61 04/13/2021 09:57 AM     No results found for: VITD3, VD3RIA        Lab Results   Component Value Date/Time    TSH 0.40 02/16/2023 08:29 AM

## 2023-07-11 ENCOUNTER — OFFICE VISIT (OUTPATIENT)
Facility: CLINIC | Age: 73
End: 2023-07-11
Payer: MEDICARE

## 2023-07-11 VITALS
HEIGHT: 62 IN | TEMPERATURE: 98.2 F | HEART RATE: 66 BPM | SYSTOLIC BLOOD PRESSURE: 130 MMHG | DIASTOLIC BLOOD PRESSURE: 72 MMHG | OXYGEN SATURATION: 95 % | RESPIRATION RATE: 16 BRPM | WEIGHT: 180.6 LBS | BODY MASS INDEX: 33.23 KG/M2

## 2023-07-11 DIAGNOSIS — E11.69 MIXED HYPERLIPIDEMIA DUE TO TYPE 2 DIABETES MELLITUS (HCC): ICD-10-CM

## 2023-07-11 DIAGNOSIS — E78.2 MIXED HYPERLIPIDEMIA DUE TO TYPE 2 DIABETES MELLITUS (HCC): ICD-10-CM

## 2023-07-11 DIAGNOSIS — I10 ESSENTIAL (PRIMARY) HYPERTENSION: ICD-10-CM

## 2023-07-11 DIAGNOSIS — E66.09 CLASS 1 OBESITY DUE TO EXCESS CALORIES WITH SERIOUS COMORBIDITY AND BODY MASS INDEX (BMI) OF 33.0 TO 33.9 IN ADULT: ICD-10-CM

## 2023-07-11 DIAGNOSIS — E03.9 ACQUIRED HYPOTHYROIDISM: ICD-10-CM

## 2023-07-11 DIAGNOSIS — E11.22 TYPE 2 DIABETES MELLITUS WITH STAGE 3B CHRONIC KIDNEY DISEASE, WITHOUT LONG-TERM CURRENT USE OF INSULIN (HCC): Primary | ICD-10-CM

## 2023-07-11 DIAGNOSIS — N18.32 TYPE 2 DIABETES MELLITUS WITH STAGE 3B CHRONIC KIDNEY DISEASE, WITHOUT LONG-TERM CURRENT USE OF INSULIN (HCC): Primary | ICD-10-CM

## 2023-07-11 PROCEDURE — 1123F ACP DISCUSS/DSCN MKR DOCD: CPT | Performed by: NURSE PRACTITIONER

## 2023-07-11 PROCEDURE — 2022F DILAT RTA XM EVC RTNOPTHY: CPT | Performed by: NURSE PRACTITIONER

## 2023-07-11 PROCEDURE — 1036F TOBACCO NON-USER: CPT | Performed by: NURSE PRACTITIONER

## 2023-07-11 PROCEDURE — G8427 DOCREV CUR MEDS BY ELIG CLIN: HCPCS | Performed by: NURSE PRACTITIONER

## 2023-07-11 PROCEDURE — 3075F SYST BP GE 130 - 139MM HG: CPT | Performed by: NURSE PRACTITIONER

## 2023-07-11 PROCEDURE — 3017F COLORECTAL CA SCREEN DOC REV: CPT | Performed by: NURSE PRACTITIONER

## 2023-07-11 PROCEDURE — 99214 OFFICE O/P EST MOD 30 MIN: CPT | Performed by: NURSE PRACTITIONER

## 2023-07-11 PROCEDURE — 1090F PRES/ABSN URINE INCON ASSESS: CPT | Performed by: NURSE PRACTITIONER

## 2023-07-11 PROCEDURE — G8399 PT W/DXA RESULTS DOCUMENT: HCPCS | Performed by: NURSE PRACTITIONER

## 2023-07-11 PROCEDURE — 3044F HG A1C LEVEL LT 7.0%: CPT | Performed by: NURSE PRACTITIONER

## 2023-07-11 PROCEDURE — G8417 CALC BMI ABV UP PARAM F/U: HCPCS | Performed by: NURSE PRACTITIONER

## 2023-07-11 PROCEDURE — 3078F DIAST BP <80 MM HG: CPT | Performed by: NURSE PRACTITIONER

## 2023-07-11 NOTE — PROGRESS NOTES
Chief Complaint   Patient presents with    Diabetes     4M       SUBJECTIVE:    Maribel Hirsch is a 67 y.o. female who is here today for a follow up appointment regarding current medical conditions including: Type 2 diabetes with stage IIIb chronic kidney disease, mixed hyperlipidemia, essential hypertension, acquired hypothyroidism, and obesity. She states she is feeling relatively well overall, and denies any new or acute complaints at this time. Patient continues to take medication as prescribed for management of her type 2 diabetes. She is checking her blood sugars periodically. Her last hemoglobin A1c showed considerable improvement from her prior level of 11.8%, and was found to be at 6.8%. She continues to maintain a healthy dietary intake with avoidance of offending foods when able. Due to her stage IIIb kidney functions, the patient was referred to nephrology for further evaluation. At last check, her kidney function showed to be at 40 mL/min. No further recommendations or changes were made at that time. She continues to take rosuvastatin and fenofibrate daily for management of her mixed hyperlipidemia. She has been tolerating this well. Her blood pressure has remained in good control and she continues to take losartan 100 mg daily as well as amlodipine 2.5 mg daily. She denies any recent episodes of chest pain, chest pressure, shortness of breath, headaches, dizziness, blurred vision, palpitations, or syncope episodes. She continues levothyroxine 88 mcg daily for management of her hypothyroidism.       Current Outpatient Medications   Medication Sig Dispense Refill    Multiple Vitamins-Minerals (WOMENS MULTIVITAMIN PO) Take by mouth daily      rosuvastatin (CRESTOR) 20 MG tablet TAKE 1 TABLET AT BEDTIME 90 tablet 1    clobetasol (TEMOVATE) 0.05 % cream APPLY TOPICALLY TO THE AFFECTED AREA TWICE DAILY 30 g 2    blood glucose test strips (TRUE METRIX BLOOD GLUCOSE TEST) strip 1 each

## 2023-07-12 LAB
ALBUMIN SERPL-MCNC: 3.9 G/DL (ref 3.5–5)
ALBUMIN/GLOB SERPL: 1.3 (ref 1.1–2.2)
ALP SERPL-CCNC: 65 U/L (ref 45–117)
ALT SERPL-CCNC: 21 U/L (ref 12–78)
ANION GAP SERPL CALC-SCNC: 3 MMOL/L (ref 5–15)
AST SERPL-CCNC: 19 U/L (ref 15–37)
BILIRUB SERPL-MCNC: 0.3 MG/DL (ref 0.2–1)
BUN SERPL-MCNC: 24 MG/DL (ref 6–20)
BUN/CREAT SERPL: 19 (ref 12–20)
CALCIUM SERPL-MCNC: 9.7 MG/DL (ref 8.5–10.1)
CHLORIDE SERPL-SCNC: 110 MMOL/L (ref 97–108)
CHOLEST SERPL-MCNC: 135 MG/DL
CO2 SERPL-SCNC: 29 MMOL/L (ref 21–32)
CREAT SERPL-MCNC: 1.28 MG/DL (ref 0.55–1.02)
EST. AVERAGE GLUCOSE BLD GHB EST-MCNC: 143 MG/DL
GLOBULIN SER CALC-MCNC: 3.1 G/DL (ref 2–4)
GLUCOSE SERPL-MCNC: 137 MG/DL (ref 65–100)
HBA1C MFR BLD: 6.6 % (ref 4–5.6)
HDLC SERPL-MCNC: 36 MG/DL
HDLC SERPL: 3.8 (ref 0–5)
LDLC SERPL CALC-MCNC: 58.2 MG/DL (ref 0–100)
POTASSIUM SERPL-SCNC: 4.8 MMOL/L (ref 3.5–5.1)
PROT SERPL-MCNC: 7 G/DL (ref 6.4–8.2)
SODIUM SERPL-SCNC: 142 MMOL/L (ref 136–145)
T4 FREE SERPL-MCNC: 1.4 NG/DL (ref 0.8–1.5)
TRIGL SERPL-MCNC: 204 MG/DL
TSH SERPL DL<=0.05 MIU/L-ACNC: 0.29 UIU/ML (ref 0.36–3.74)
VLDLC SERPL CALC-MCNC: 40.8 MG/DL

## 2023-07-12 NOTE — TELEPHONE ENCOUNTER
PCP: MARCIN Miller NP    Last appt: 7/11/2023  Future Appointments   Date Time Provider [address]   11/14/2023  8:00 AM MARCIN Miller NP [facility] BS AMB       Requested Prescriptions     Pending Prescriptions Disp Refills    losartan (COZAAR) 100 MG tablet [Pharmacy Med Name: Michelle Rand TABS 100MG] 90 tablet 3     Sig: TAKE 1 TABLET DAILY    metFORMIN (GLUCOPHAGE-XR) 500 MG extended release tablet [Pharmacy Med Name: METFORMIN HCL ER TABS 500MG] 180 tablet 3     Sig: TAKE 1 TABLET TWICE A DAY WITH MEALS    levothyroxine (SYNTHROID) 88 MCG tablet [Pharmacy Med Name: L-THYROXINE (SYNTHROID) TABS 88MCG] 90 tablet 3     Sig: TAKE 1 TABLET DAILY BEFORE BREAKFAST    amLODIPine (NORVASC) 2.5 MG tablet [Pharmacy Med Name: AMLODIPINE BESYLATE TABS 2.5MG] 90 tablet 3     Sig: TAKE 1 TABLET DAILY FOR HIGH BLOOD PRESSURE       Prior labs and Blood pressures:  BP Readings from Last 3 Encounters:   07/11/23 130/72   05/08/23 130/74   03/02/23 130/72     Lab Results   Component Value Date/Time     07/11/2023 09:18 AM    K 4.8 07/11/2023 09:18 AM     07/11/2023 09:18 AM    CO2 29 07/11/2023 09:18 AM    BUN 24 07/11/2023 09:18 AM    GFRAA 42 05/04/2022 09:33 AM     No results found for: HBA1C, [id number]  Lab Results   Component Value Date/Time    CHOL 135 07/11/2023 09:18 AM    HDL 36 07/11/2023 09:18 AM    VLDL 61 04/13/2021 09:57 AM     No results found for: VITD3, VD3RIA        Lab Results   Component Value Date/Time    TSH 0.40 02/16/2023 08:29 AM

## 2023-07-13 RX ORDER — METFORMIN HYDROCHLORIDE 500 MG/1
TABLET, EXTENDED RELEASE ORAL
Qty: 180 TABLET | Refills: 1 | Status: SHIPPED | OUTPATIENT
Start: 2023-07-13

## 2023-07-13 RX ORDER — LOSARTAN POTASSIUM 100 MG/1
TABLET ORAL
Qty: 90 TABLET | Refills: 1 | Status: SHIPPED | OUTPATIENT
Start: 2023-07-13

## 2023-07-13 RX ORDER — AMLODIPINE BESYLATE 2.5 MG/1
TABLET ORAL
Qty: 90 TABLET | Refills: 1 | Status: SHIPPED | OUTPATIENT
Start: 2023-07-13

## 2023-07-13 RX ORDER — LEVOTHYROXINE SODIUM 88 UG/1
TABLET ORAL
Qty: 90 TABLET | Refills: 1 | Status: SHIPPED | OUTPATIENT
Start: 2023-07-13

## 2023-07-18 DIAGNOSIS — E11.42 DIABETIC POLYNEUROPATHY ASSOCIATED WITH TYPE 2 DIABETES MELLITUS (HCC): Primary | ICD-10-CM

## 2023-07-19 RX ORDER — GABAPENTIN 300 MG/1
CAPSULE ORAL
Qty: 270 CAPSULE | Refills: 1 | Status: SHIPPED | OUTPATIENT
Start: 2023-07-19 | End: 2024-07-18

## 2023-07-19 NOTE — TELEPHONE ENCOUNTER
PCP: MARCIN Yadav NP    Last appt: 7/11/2023  Future Appointments   Date Time Provider [address]   11/14/2023  8:00 AM MARCIN Yadav NP [facility] BS AMB       Requested Prescriptions     Pending Prescriptions Disp Refills    gabapentin (NEURONTIN) 300 MG capsule [Pharmacy Med Name: GABAPENTIN 300MG CAPSULES] 270 capsule 1     Sig: TAKE ONE CAPSULE BY MOUTH EACH MORNING, TAKE 2 CAPSULES AT BEDTIME.        Prior labs and Blood pressures:  BP Readings from Last 3 Encounters:   07/11/23 130/72   05/08/23 130/74   03/02/23 130/72     Lab Results   Component Value Date/Time     07/11/2023 09:18 AM    K 4.8 07/11/2023 09:18 AM     07/11/2023 09:18 AM    CO2 29 07/11/2023 09:18 AM    BUN 24 07/11/2023 09:18 AM    GFRAA 42 05/04/2022 09:33 AM     No results found for: HBA1C, [id number]  Lab Results   Component Value Date/Time    CHOL 135 07/11/2023 09:18 AM    HDL 36 07/11/2023 09:18 AM    VLDL 61 04/13/2021 09:57 AM     No results found for: VITD3, VD3RIA        Lab Results   Component Value Date/Time    TSH 0.40 02/16/2023 08:29 AM

## 2023-08-03 RX ORDER — UREA 40 %
CREAM (GRAM) TOPICAL PRN
COMMUNITY

## 2023-08-03 RX ORDER — CETIRIZINE HYDROCHLORIDE 10 MG/1
10 TABLET ORAL DAILY
COMMUNITY

## 2023-08-04 ENCOUNTER — ANESTHESIA (OUTPATIENT)
Facility: HOSPITAL | Age: 73
End: 2023-08-04
Payer: MEDICARE

## 2023-08-04 ENCOUNTER — ANESTHESIA EVENT (OUTPATIENT)
Facility: HOSPITAL | Age: 73
End: 2023-08-04
Payer: MEDICARE

## 2023-08-04 ENCOUNTER — HOSPITAL ENCOUNTER (OUTPATIENT)
Facility: HOSPITAL | Age: 73
Setting detail: OUTPATIENT SURGERY
Discharge: HOME | End: 2023-08-04
Attending: SURGERY | Admitting: SURGERY
Payer: MEDICARE

## 2023-08-04 VITALS
SYSTOLIC BLOOD PRESSURE: 149 MMHG | TEMPERATURE: 97.2 F | RESPIRATION RATE: 15 BRPM | OXYGEN SATURATION: 98 % | WEIGHT: 181 LBS | HEIGHT: 62 IN | BODY MASS INDEX: 33.31 KG/M2 | DIASTOLIC BLOOD PRESSURE: 32 MMHG | HEART RATE: 73 BPM

## 2023-08-04 PROCEDURE — 3700000001 HC ADD 15 MINUTES (ANESTHESIA): Performed by: SURGERY

## 2023-08-04 PROCEDURE — 2709999900 HC NON-CHARGEABLE SUPPLY: Performed by: SURGERY

## 2023-08-04 PROCEDURE — 88305 TISSUE EXAM BY PATHOLOGIST: CPT

## 2023-08-04 PROCEDURE — 3600007512: Performed by: SURGERY

## 2023-08-04 PROCEDURE — 3700000000 HC ANESTHESIA ATTENDED CARE: Performed by: SURGERY

## 2023-08-04 PROCEDURE — 2580000003 HC RX 258: Performed by: NURSE ANESTHETIST, CERTIFIED REGISTERED

## 2023-08-04 PROCEDURE — 2580000003 HC RX 258: Performed by: SURGERY

## 2023-08-04 PROCEDURE — 6360000002 HC RX W HCPCS: Performed by: NURSE ANESTHETIST, CERTIFIED REGISTERED

## 2023-08-04 PROCEDURE — 7100000010 HC PHASE II RECOVERY - FIRST 15 MIN: Performed by: SURGERY

## 2023-08-04 PROCEDURE — 3600007502: Performed by: SURGERY

## 2023-08-04 PROCEDURE — 2500000003 HC RX 250 WO HCPCS: Performed by: NURSE ANESTHETIST, CERTIFIED REGISTERED

## 2023-08-04 RX ORDER — SODIUM CHLORIDE 0.9 % (FLUSH) 0.9 %
5-40 SYRINGE (ML) INJECTION PRN
Status: DISCONTINUED | OUTPATIENT
Start: 2023-08-04 | End: 2023-08-04 | Stop reason: HOSPADM

## 2023-08-04 RX ORDER — LIDOCAINE HYDROCHLORIDE 20 MG/ML
INJECTION, SOLUTION EPIDURAL; INFILTRATION; INTRACAUDAL; PERINEURAL PRN
Status: DISCONTINUED | OUTPATIENT
Start: 2023-08-04 | End: 2023-08-04 | Stop reason: SDUPTHER

## 2023-08-04 RX ORDER — SODIUM CHLORIDE 0.9 % (FLUSH) 0.9 %
5-40 SYRINGE (ML) INJECTION EVERY 12 HOURS SCHEDULED
Status: DISCONTINUED | OUTPATIENT
Start: 2023-08-04 | End: 2023-08-04 | Stop reason: HOSPADM

## 2023-08-04 RX ORDER — SODIUM CHLORIDE 9 MG/ML
INJECTION, SOLUTION INTRAVENOUS CONTINUOUS PRN
Status: COMPLETED | OUTPATIENT
Start: 2023-08-04 | End: 2023-08-04

## 2023-08-04 RX ORDER — SODIUM CHLORIDE 9 MG/ML
25 INJECTION, SOLUTION INTRAVENOUS PRN
Status: DISCONTINUED | OUTPATIENT
Start: 2023-08-04 | End: 2023-08-04 | Stop reason: HOSPADM

## 2023-08-04 RX ORDER — 0.9 % SODIUM CHLORIDE 0.9 %
INTRAVENOUS SOLUTION INTRAVENOUS PRN
Status: DISCONTINUED | OUTPATIENT
Start: 2023-08-04 | End: 2023-08-04 | Stop reason: SDUPTHER

## 2023-08-04 RX ADMIN — SODIUM CHLORIDE 500 ML: 900 INJECTION, SOLUTION INTRAVENOUS at 08:19

## 2023-08-04 RX ADMIN — PROPOFOL 50 MG: 10 INJECTION, EMULSION INTRAVENOUS at 08:05

## 2023-08-04 RX ADMIN — LIDOCAINE HYDROCHLORIDE 50 MG: 20 INJECTION, SOLUTION EPIDURAL; INFILTRATION; INTRACAUDAL; PERINEURAL at 08:05

## 2023-08-04 RX ADMIN — PROPOFOL 50 MG: 10 INJECTION, EMULSION INTRAVENOUS at 08:11

## 2023-08-04 RX ADMIN — PROPOFOL 50 MG: 10 INJECTION, EMULSION INTRAVENOUS at 08:06

## 2023-08-04 ASSESSMENT — PAIN - FUNCTIONAL ASSESSMENT: PAIN_FUNCTIONAL_ASSESSMENT: NONE - DENIES PAIN

## 2023-08-04 NOTE — DISCHARGE INSTRUCTIONS
Lesa Washington  [id number]  1950    COLON DISCHARGE INSTRUCTIONS  Discomfort:  Redness at IV site- apply warm compress to area; if redness or soreness persist- contact your physician  There may be a slight amount of blood passed from the rectum  Gaseous discomfort- walking, belching will help relieve any discomfort  You may not operate a vehicle for 12 hours  You may not engage in an occupation involving machinery or appliances for rest of today  You may not drink alcoholic beverages for at least 12 hours  Avoid making any critical decisions for at least 24 hour  DIET:   Regular diet. - however -  remember your colon is empty and a heavy meal will produce gas. Avoid these foods:  vegetables, fried / greasy foods, carbonated drinks for today    MEDICATIONS:  [unfilled]     ACTIVITY:  You may resume your normal daily activities it is recommended that you spend the remainder of the day resting -  avoid any strenuous activity. CALL M.D. ANY SIGN OF:   Increasing pain, nausea, vomiting  Abdominal distension (swelling)  New increased bleeding (oral or rectal)  Fever (chills)  Pain in chest area  Bloody discharge from nose or mouth  Shortness of breath     Follow-up Instructions:   Call Za Lassiter MD if any questions or problems. Telephone # [phone]  Biopsy results will be available in  7 to10 days  Should have a repeat colonoscopy in 5 years. COLONOSCOPY FINDINGS:  Your colonoscopy showed: 1 polyp (removed), diverticulosis, and external hemorrhoid.       Patient Education on Sedation / Analgesia Administered for Procedure      For 24 hours after general anesthesia or intravenous analgesia / sedation:  Have someone responsible help you with your care  Limit your activities  Do not drive and operate hazardous machinery  Do not make important personal, legal or business decisions  Do not drink alcoholic beverages  If you have not urinated within 8 hours after discharge, please contact your physician  Resume your medications unless otherwise instructed    For 24 hours after general anesthesia or intravenous analgesia / sedation  you may experience:  Drowsiness, dizziness, sleepiness, or confusion  Difficulty remembering or delayed reaction times  Difficulty with your balance, especially while walking, move slowly and carefully, do not make sudden position changes  Difficulty focusing or blurred vision    You may not be aware of slight changes in your behavior and/or your reaction time because of the medication used during and after your procedure. Report the following to your physician:  Excessive pain, swelling, redness or odor of or around the surgical area  Temperature over 100.5  Nausea and vomiting lasting longer than 4 hours or if unable to take medications  Any signs of decreased circulation or nerve impairment to extremity: change in color, persistent numbness, tingling, coldness or increase pain  Any questions or concerns    IF YOU REPORT TO AN EMERGENCY ROOM, DOCTOR'S OFFICE OR HOSPITAL WITHIN 24 HOURS AFTER YOUR PROCEDURE, BRING THIS SHEET AND YOUR AFTER VISIT SUMMARY WITH YOU AND GIVE IT TO THE PHYSICIAN OR NURSE ATTENDING YOU. Hemorrhoids: Care Instructions  Overview     Hemorrhoids are swollen veins that develop in the anal canal. Bleeding during bowel movements, itching, and rectal pain are the most common symptoms. Hemorrhoids can be uncomfortable at times, but rarely are they a serious problem. Most of the time, you can treat them with simple changes to your diet and bowel habits. These changes include eating more fiber and not straining to pass stools. Most hemorrhoids don't need surgery or other treatment unless they are very large and painful or bleed a lot. Follow-up care is a key part of your treatment and safety. Be sure to make and go to all appointments, and call your doctor if you are having problems.  It's also a good idea to know your test results and keep a list of

## 2023-08-04 NOTE — H&P
History and Physical    Patient: Maureen Cyr MRN: [id number]  SSN: [id number]    YOB: 1950  Age: 67 y.o. Sex: female      Subjective:      Maureen Cyr is a 67 y.o. female who presents for colonoscopy. Past Medical History:   Diagnosis Date    Allergic rhinitis     Anemia     Basal cell carcinoma     tip of nose and cheek    Carcinoma (HCC)     tip of nose & cheek    Chronic kidney disease     Dr. Cathi Hassan; Stage III    Eczema     intrinsic    History of kidney stones     HTN (hypertension)     Hyperlipidemia     IDALMIS (obstructive sleep apnea)     non-compliant    Osteopenia     Osteoporosis     Pigmentary retinopathy     Type II or unspecified type diabetes mellitus without mention of complication, uncontrolled     Unspecified hypothyroidism      Past Surgical History:   Procedure Laterality Date    BASAL CELL CARCINOMA EXCISION Left 2023    left jaw    CHOLECYSTECTOMY      COLONOSCOPY N/A 4/10/2018    COLONOSCOPY performed by Digna Iraheta MD at [facility] ENDOSCOPY    DELIVERY       x2    OTHER SURGICAL HISTORY  3/10/16    gastric sleeve    POLYPECTOMY      ROTATOR CUFF REPAIR Right 2017    TONSILLECTOMY      TUBAL LIGATION        Family History   Problem Relation Age of Onset    Other Mother         aortic aneurysm    Cancer Mother         lung    Stroke Father     Diabetes Father     Diabetes type 2  Brother      Social History     Tobacco Use    Smoking status: Never     Passive exposure: Never    Smokeless tobacco: Never   Substance Use Topics    Alcohol use: Not Currently      Prior to Admission medications    Medication Sig Start Date End Date Taking?  Authorizing Provider   cetirizine (ZYRTEC) 10 MG tablet Take 1 tablet by mouth daily   Yes Historical Provider, MD   Probiotic Product (PROBIOTIC PO) Take by mouth daily   Yes Historical Provider, MD   Urea (CARMOL) 40 % cream Apply topically as needed   Yes Historical Provider, MD   gabapentin (NEURONTIN) 300 MG

## 2023-08-04 NOTE — PROCEDURES
Colonoscopy Procedure Note    Randa Herrera  1950  [id number]    Indications:    Personal history of colon polyps (screening only)     Pre-operative Diagnosis: Hx of colonic polyps [Z86.010]    Post-operative Diagnosis: * No post-op diagnosis entered *    : Sonali Go MD    Referring Provider: MARCIN Beck NP    Sedation:  MAC anesthesia Propofol    Procedure Details:    After informed consent was obtained with all risks and benefits of procedure explained and preoperative exam completed, the patient was taken to the endoscopy suite and placed in the left lateral decubitus position. Upon sequential sedation as per above, a digital rectal exam was performed  And was normal.  The Olympus videocolonoscope  was inserted in the rectum and carefully advanced to the cecum, which was identified by the ileocecal valve and appendiceal orifice. The quality of preparation was good. The colonoscope was slowly withdrawn with careful evaluation between folds. Retroflexion in the rectum was performed and was normal..     Findings:   Rectum: Small soft external hemorrhoids  Sigmoid: Diverticulosis  Descending Colon: Diverticulosis  Transverse Colon: Diverticulosis and one 8mm sessile polyp. Removed and retrieved with hot snare  Ascending Colon: Diverticulosis  Cecum: normal  Terminal Ileum: not intubated    Therapies:  Polypectomy    Specimen:   Polyp    Complications: None. EBL:  None    Recommendations: -Repeat colonoscopy in 5 years.       Sonali Go MD  8/4/2023  8:17 AM

## 2023-08-04 NOTE — PROGRESS NOTES
Endoscopy Case End Note:     Procedure scope was pre-cleaned, per protocol, at bedside by DORY Patel RN. Report received from anesthesia. See anesthesia flowsheet for intra-procedure vital signs and events. Glasses returned to patient. Belongings remain under stretcher with patient.

## 2023-10-19 RX ORDER — SITAGLIPTIN 100 MG/1
100 TABLET, FILM COATED ORAL DAILY
Qty: 90 TABLET | Refills: 1 | Status: SHIPPED | OUTPATIENT
Start: 2023-10-19

## 2023-10-19 NOTE — TELEPHONE ENCOUNTER
PCP: MARCIN Anne NP    Last appt: 7/11/2023    Future Appointments   Date Time Provider [facility] [address]   11/14/2023  8:00 AM MARCIN Anne NP [facility] BS AMB       Requested Prescriptions     Pending Prescriptions Disp Refills    JANUVIA 100 MG tablet [Pharmacy Med Name: JANUVIA 100MG TABLETS] 90 tablet 1     Sig: TAKE 1 TABLET BY MOUTH DAILY

## 2023-11-13 RX ORDER — FENOFIBRATE 160 MG/1
TABLET ORAL
Qty: 90 TABLET | Refills: 1 | Status: SHIPPED | OUTPATIENT
Start: 2023-11-13

## 2023-11-13 NOTE — TELEPHONE ENCOUNTER
PCP: MARCIN Sloan NP    Last appt: 7/11/2023    Future Appointments   Date Time Provider [facility] [address]   1/25/2024  9:30 AM MARCIN Sloan NP [facility] BS AMB       Requested Prescriptions     Pending Prescriptions Disp Refills    fenofibrate (TRIGLIDE) 160 MG tablet [Pharmacy Med Name: FENOFIBRATE 160MG TABLETS] 90 tablet 1     Sig: TAKE 1 TABLET BY MOUTH DAILY FOR HIGH CHOLESTEROL

## 2023-12-13 RX ORDER — ROSUVASTATIN CALCIUM 20 MG/1
TABLET, COATED ORAL
Qty: 90 TABLET | Refills: 3 | Status: SHIPPED | OUTPATIENT
Start: 2023-12-13

## 2023-12-13 NOTE — TELEPHONE ENCOUNTER
PCP: MARCIN Sloan NP    Last appt: 7/11/2023    Future Appointments   Date Time Provider [facility] [address]   1/25/2024  9:30 AM MARCIN Sloan NP [facility] BS AMB       Requested Prescriptions     Pending Prescriptions Disp Refills    rosuvastatin (CRESTOR) 20 MG tablet [Pharmacy Med Name: ROSUVASTATIN TABS 20MG] 90 tablet 3     Sig: TAKE 1 TABLET AT BEDTIME

## 2024-01-08 RX ORDER — METFORMIN HYDROCHLORIDE 500 MG/1
TABLET, EXTENDED RELEASE ORAL
Qty: 180 TABLET | Refills: 1 | Status: SHIPPED | OUTPATIENT
Start: 2024-01-08

## 2024-01-08 RX ORDER — LEVOTHYROXINE SODIUM 88 UG/1
TABLET ORAL
Qty: 90 TABLET | Refills: 1 | Status: SHIPPED | OUTPATIENT
Start: 2024-01-08

## 2024-01-08 RX ORDER — AMLODIPINE BESYLATE 2.5 MG/1
TABLET ORAL
Qty: 90 TABLET | Refills: 1 | Status: SHIPPED | OUTPATIENT
Start: 2024-01-08

## 2024-01-08 RX ORDER — LOSARTAN POTASSIUM 100 MG/1
TABLET ORAL
Qty: 90 TABLET | Refills: 1 | Status: SHIPPED | OUTPATIENT
Start: 2024-01-08

## 2024-01-08 NOTE — TELEPHONE ENCOUNTER
PCP: José Miguel Ch APRN - NP    Last appt: 7/11/2023    Future Appointments   Date Time Provider Department Center   1/25/2024  9:30 AM José Miguel Ch APRN - NP [facility] BS AMB       Requested Prescriptions     Pending Prescriptions Disp Refills    losartan (COZAAR) 100 MG tablet [Pharmacy Med Name: LOSARTAN TABS 100MG] 90 tablet 3     Sig: TAKE 1 TABLET DAILY    metFORMIN (GLUCOPHAGE-XR) 500 MG extended release tablet [Pharmacy Med Name: METFORMIN HCL ER TABS 500MG] 180 tablet 3     Sig: TAKE 1 TABLET TWICE A DAY WITH MEALS    amLODIPine (NORVASC) 2.5 MG tablet [Pharmacy Med Name: AMLODIPINE BESYLATE TABS 2.5MG] 90 tablet 3     Sig: TAKE 1 TABLET DAILY FOR HIGH BLOOD PRESSURE    levothyroxine (SYNTHROID) 88 MCG tablet [Pharmacy Med Name: L-THYROXINE (SYNTHROID) TABS 88MCG] 90 tablet 3     Sig: TAKE 1 TABLET DAILY BEFORE BREAKFAST

## 2024-01-25 ENCOUNTER — OFFICE VISIT (OUTPATIENT)
Facility: CLINIC | Age: 74
End: 2024-01-25
Payer: MEDICARE

## 2024-01-25 VITALS
HEART RATE: 67 BPM | DIASTOLIC BLOOD PRESSURE: 74 MMHG | SYSTOLIC BLOOD PRESSURE: 126 MMHG | OXYGEN SATURATION: 97 % | HEIGHT: 62 IN | BODY MASS INDEX: 32.94 KG/M2 | RESPIRATION RATE: 16 BRPM | WEIGHT: 179 LBS | TEMPERATURE: 98.1 F

## 2024-01-25 DIAGNOSIS — N18.31 TYPE 2 DIABETES MELLITUS WITH STAGE 3A CHRONIC KIDNEY DISEASE, WITHOUT LONG-TERM CURRENT USE OF INSULIN (HCC): Primary | ICD-10-CM

## 2024-01-25 DIAGNOSIS — M25.551 RIGHT HIP PAIN: ICD-10-CM

## 2024-01-25 DIAGNOSIS — E11.69 MIXED HYPERLIPIDEMIA DUE TO TYPE 2 DIABETES MELLITUS (HCC): ICD-10-CM

## 2024-01-25 DIAGNOSIS — E78.2 MIXED HYPERLIPIDEMIA DUE TO TYPE 2 DIABETES MELLITUS (HCC): ICD-10-CM

## 2024-01-25 DIAGNOSIS — I10 ESSENTIAL HYPERTENSION: ICD-10-CM

## 2024-01-25 DIAGNOSIS — E11.42 DIABETIC POLYNEUROPATHY ASSOCIATED WITH TYPE 2 DIABETES MELLITUS (HCC): ICD-10-CM

## 2024-01-25 DIAGNOSIS — E03.9 ACQUIRED HYPOTHYROIDISM: ICD-10-CM

## 2024-01-25 DIAGNOSIS — E11.22 TYPE 2 DIABETES MELLITUS WITH STAGE 3A CHRONIC KIDNEY DISEASE, WITHOUT LONG-TERM CURRENT USE OF INSULIN (HCC): Primary | ICD-10-CM

## 2024-01-25 DIAGNOSIS — E66.09 CLASS 1 OBESITY DUE TO EXCESS CALORIES WITH SERIOUS COMORBIDITY AND BODY MASS INDEX (BMI) OF 32.0 TO 32.9 IN ADULT: ICD-10-CM

## 2024-01-25 PROBLEM — N18.32 TYPE 2 DIABETES MELLITUS WITH STAGE 3B CHRONIC KIDNEY DISEASE, WITHOUT LONG-TERM CURRENT USE OF INSULIN (HCC): Status: RESOLVED | Noted: 2023-03-02 | Resolved: 2024-01-25

## 2024-01-25 PROCEDURE — 3017F COLORECTAL CA SCREEN DOC REV: CPT | Performed by: NURSE PRACTITIONER

## 2024-01-25 PROCEDURE — 3046F HEMOGLOBIN A1C LEVEL >9.0%: CPT | Performed by: NURSE PRACTITIONER

## 2024-01-25 PROCEDURE — G8399 PT W/DXA RESULTS DOCUMENT: HCPCS | Performed by: NURSE PRACTITIONER

## 2024-01-25 PROCEDURE — 3074F SYST BP LT 130 MM HG: CPT | Performed by: NURSE PRACTITIONER

## 2024-01-25 PROCEDURE — 1090F PRES/ABSN URINE INCON ASSESS: CPT | Performed by: NURSE PRACTITIONER

## 2024-01-25 PROCEDURE — G8484 FLU IMMUNIZE NO ADMIN: HCPCS | Performed by: NURSE PRACTITIONER

## 2024-01-25 PROCEDURE — 1123F ACP DISCUSS/DSCN MKR DOCD: CPT | Performed by: NURSE PRACTITIONER

## 2024-01-25 PROCEDURE — 2022F DILAT RTA XM EVC RTNOPTHY: CPT | Performed by: NURSE PRACTITIONER

## 2024-01-25 PROCEDURE — 1036F TOBACCO NON-USER: CPT | Performed by: NURSE PRACTITIONER

## 2024-01-25 PROCEDURE — G8427 DOCREV CUR MEDS BY ELIG CLIN: HCPCS | Performed by: NURSE PRACTITIONER

## 2024-01-25 PROCEDURE — 99214 OFFICE O/P EST MOD 30 MIN: CPT | Performed by: NURSE PRACTITIONER

## 2024-01-25 PROCEDURE — G8417 CALC BMI ABV UP PARAM F/U: HCPCS | Performed by: NURSE PRACTITIONER

## 2024-01-25 PROCEDURE — 3078F DIAST BP <80 MM HG: CPT | Performed by: NURSE PRACTITIONER

## 2024-01-25 ASSESSMENT — PATIENT HEALTH QUESTIONNAIRE - PHQ9
SUM OF ALL RESPONSES TO PHQ QUESTIONS 1-9: 0
SUM OF ALL RESPONSES TO PHQ QUESTIONS 1-9: 0
1. LITTLE INTEREST OR PLEASURE IN DOING THINGS: 0
SUM OF ALL RESPONSES TO PHQ QUESTIONS 1-9: 0
2. FEELING DOWN, DEPRESSED OR HOPELESS: 0
SUM OF ALL RESPONSES TO PHQ9 QUESTIONS 1 & 2: 0
SUM OF ALL RESPONSES TO PHQ QUESTIONS 1-9: 0

## 2024-01-25 NOTE — PROGRESS NOTES
Deysi Honeycutt is a 73 y.o. female     Chief Complaint   Patient presents with    Diabetes     4M    Hypertension     4M       /74 (Site: Left Upper Arm, Position: Sitting, Cuff Size: Large Adult)   Pulse 67   Temp 98.1 °F (36.7 °C) (Oral)   Resp 16   Ht 1.575 m (5' 2\")   Wt 81.2 kg (179 lb)   SpO2 97%   BMI 32.74 kg/m²     Health Maintenance Due   Topic Date Due    DTaP/Tdap/Td vaccine (1 - Tdap) Never done    Respiratory Syncytial Virus (RSV) Pregnant or age 60 yrs+ (1 - 1-dose 60+ series) Never done    Diabetic foot exam  01/11/2022    Flu vaccine (1) 08/01/2023    COVID-19 Vaccine (6 - 2023-24 season) 09/01/2023    Diabetic Alb to Cr ratio (uACR) test  02/16/2024         \"Have you been to the ER, urgent care clinic since your last visit?  Hospitalized since your last visit?\"    NO    “Have you seen or consulted any other health care providers outside of [facility] since your last visit?”    NO                     
paresthesia,                                      Speech difficulties, memory loss, gait difficulty  Psychological:          Feelings of anxiety, depression, agitation        Social History     Socioeconomic History    Marital status:      Spouse name: None    Number of children: None    Years of education: None    Highest education level: None   Tobacco Use    Smoking status: Never     Passive exposure: Never    Smokeless tobacco: Never   Vaping Use    Vaping Use: Never used   Substance and Sexual Activity    Alcohol use: Not Currently    Drug use: No   Social History Narrative    Likes to read and play on the computer.    Lives in [place] with  of 39 years.  Has one daughter and one son.    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     Social Determinants of Health     Financial Resource Strain: Low Risk  (5/8/2023)    Overall Financial Resource Strain (CARDIA)     Difficulty of Paying Living Expenses: Not hard at all   Transportation Needs: Unknown (5/8/2023)    PRAPARE - Transportation     Lack of Transportation (Non-Medical): No   Physical Activity: Patient Declined (5/6/2023)    Exercise Vital Sign     Days of Exercise per Week: Patient declined     Minutes of Exercise per Session: Patient declined   Housing Stability: Unknown (5/8/2023)    Housing Stability Vital Sign     Unstable Housing in the Last Year: No     Family History   Problem Relation Age of Onset    Other Mother         aortic aneurysm    Cancer Mother         lung    Stroke Father     Diabetes Father     Diabetes type 2  Brother        OBJECTIVE:     /74 (Site: Left Upper Arm, Position: Sitting, Cuff Size: Large Adult)   Pulse 67   Temp 98.1 °F (36.7 °C) (Oral)   Resp 16   Ht 1.575 m (5' 2\")   Wt 81.2 kg (179 lb)   SpO2 97%   BMI 32.74 kg/m²     Constitutional: She appears well nourished, of stated age, and dressed appropriately.  Eyes: Sclera anicteric, PERRLA, EOMI  Neck: Supple

## 2024-01-26 LAB
ALBUMIN SERPL-MCNC: 4.2 G/DL (ref 3.5–5)
ALBUMIN/GLOB SERPL: 1.3 (ref 1.1–2.2)
ALP SERPL-CCNC: 76 U/L (ref 45–117)
ALT SERPL-CCNC: 25 U/L (ref 12–78)
ANION GAP SERPL CALC-SCNC: 9 MMOL/L (ref 5–15)
AST SERPL-CCNC: 18 U/L (ref 15–37)
BILIRUB SERPL-MCNC: 0.4 MG/DL (ref 0.2–1)
BUN SERPL-MCNC: 26 MG/DL (ref 6–20)
BUN/CREAT SERPL: 19 (ref 12–20)
CALCIUM SERPL-MCNC: 9.8 MG/DL (ref 8.5–10.1)
CHLORIDE SERPL-SCNC: 107 MMOL/L (ref 97–108)
CHOLEST SERPL-MCNC: 146 MG/DL
CO2 SERPL-SCNC: 25 MMOL/L (ref 21–32)
CREAT SERPL-MCNC: 1.34 MG/DL (ref 0.55–1.02)
CREAT UR-MCNC: 103 MG/DL
ERYTHROCYTE [DISTWIDTH] IN BLOOD BY AUTOMATED COUNT: 13 % (ref 11.5–14.5)
EST. AVERAGE GLUCOSE BLD GHB EST-MCNC: 148 MG/DL
GLOBULIN SER CALC-MCNC: 3.3 G/DL (ref 2–4)
GLUCOSE SERPL-MCNC: 168 MG/DL (ref 65–100)
HBA1C MFR BLD: 6.8 % (ref 4–5.6)
HCT VFR BLD AUTO: 36.3 % (ref 35–47)
HDLC SERPL-MCNC: 38 MG/DL
HDLC SERPL: 3.8 (ref 0–5)
HGB BLD-MCNC: 11.5 G/DL (ref 11.5–16)
LDLC SERPL CALC-MCNC: 65 MG/DL (ref 0–100)
MCH RBC QN AUTO: 29.5 PG (ref 26–34)
MCHC RBC AUTO-ENTMCNC: 31.7 G/DL (ref 30–36.5)
MCV RBC AUTO: 93.1 FL (ref 80–99)
MICROALBUMIN UR-MCNC: 6.76 MG/DL
MICROALBUMIN/CREAT UR-RTO: 66 MG/G (ref 0–30)
NRBC # BLD: 0 K/UL (ref 0–0.01)
NRBC BLD-RTO: 0 PER 100 WBC
PLATELET # BLD AUTO: 220 K/UL (ref 150–400)
PMV BLD AUTO: 11.5 FL (ref 8.9–12.9)
POTASSIUM SERPL-SCNC: 4.2 MMOL/L (ref 3.5–5.1)
PROT SERPL-MCNC: 7.5 G/DL (ref 6.4–8.2)
RBC # BLD AUTO: 3.9 M/UL (ref 3.8–5.2)
SODIUM SERPL-SCNC: 141 MMOL/L (ref 136–145)
T4 FREE SERPL-MCNC: 1.3 NG/DL (ref 0.8–1.5)
TRIGL SERPL-MCNC: 215 MG/DL
TSH SERPL DL<=0.05 MIU/L-ACNC: 0.6 UIU/ML (ref 0.36–3.74)
VLDLC SERPL CALC-MCNC: 43 MG/DL
WBC # BLD AUTO: 7.9 K/UL (ref 3.6–11)

## 2024-01-29 SDOH — HEALTH STABILITY: PHYSICAL HEALTH: ON AVERAGE, HOW MANY DAYS PER WEEK DO YOU ENGAGE IN MODERATE TO STRENUOUS EXERCISE (LIKE A BRISK WALK)?: 0 DAYS

## 2024-01-29 SDOH — HEALTH STABILITY: PHYSICAL HEALTH: ON AVERAGE, HOW MANY MINUTES DO YOU ENGAGE IN EXERCISE AT THIS LEVEL?: 0 MIN

## 2024-01-30 ENCOUNTER — OFFICE VISIT (OUTPATIENT)
Age: 74
End: 2024-01-30
Payer: MEDICARE

## 2024-01-30 VITALS
HEIGHT: 62 IN | TEMPERATURE: 97.6 F | BODY MASS INDEX: 33.49 KG/M2 | DIASTOLIC BLOOD PRESSURE: 78 MMHG | WEIGHT: 182 LBS | OXYGEN SATURATION: 98 % | RESPIRATION RATE: 18 BRPM | SYSTOLIC BLOOD PRESSURE: 137 MMHG | HEART RATE: 68 BPM

## 2024-01-30 DIAGNOSIS — M25.551 RIGHT HIP PAIN: Primary | ICD-10-CM

## 2024-01-30 DIAGNOSIS — M25.559 GREATER TROCHANTERIC PAIN SYNDROME: ICD-10-CM

## 2024-01-30 PROCEDURE — 1036F TOBACCO NON-USER: CPT | Performed by: ORTHOPAEDIC SURGERY

## 2024-01-30 PROCEDURE — G8427 DOCREV CUR MEDS BY ELIG CLIN: HCPCS | Performed by: ORTHOPAEDIC SURGERY

## 2024-01-30 PROCEDURE — G8417 CALC BMI ABV UP PARAM F/U: HCPCS | Performed by: ORTHOPAEDIC SURGERY

## 2024-01-30 PROCEDURE — 3078F DIAST BP <80 MM HG: CPT | Performed by: ORTHOPAEDIC SURGERY

## 2024-01-30 PROCEDURE — 3017F COLORECTAL CA SCREEN DOC REV: CPT | Performed by: ORTHOPAEDIC SURGERY

## 2024-01-30 PROCEDURE — G8484 FLU IMMUNIZE NO ADMIN: HCPCS | Performed by: ORTHOPAEDIC SURGERY

## 2024-01-30 PROCEDURE — 1090F PRES/ABSN URINE INCON ASSESS: CPT | Performed by: ORTHOPAEDIC SURGERY

## 2024-01-30 PROCEDURE — G8399 PT W/DXA RESULTS DOCUMENT: HCPCS | Performed by: ORTHOPAEDIC SURGERY

## 2024-01-30 PROCEDURE — 1123F ACP DISCUSS/DSCN MKR DOCD: CPT | Performed by: ORTHOPAEDIC SURGERY

## 2024-01-30 PROCEDURE — 99203 OFFICE O/P NEW LOW 30 MIN: CPT | Performed by: ORTHOPAEDIC SURGERY

## 2024-01-30 PROCEDURE — 3075F SYST BP GE 130 - 139MM HG: CPT | Performed by: ORTHOPAEDIC SURGERY

## 2024-01-30 ASSESSMENT — PATIENT HEALTH QUESTIONNAIRE - PHQ9
1. LITTLE INTEREST OR PLEASURE IN DOING THINGS: 0
2. FEELING DOWN, DEPRESSED OR HOPELESS: 0
SUM OF ALL RESPONSES TO PHQ QUESTIONS 1-9: 0
SUM OF ALL RESPONSES TO PHQ9 QUESTIONS 1 & 2: 0
SUM OF ALL RESPONSES TO PHQ QUESTIONS 1-9: 0

## 2024-01-30 NOTE — PROGRESS NOTES
1/30/2024        Assessment: Peritrochanteric pain syndrome, right hip    Plan:  This patient and I did discuss at length the anatomy, which I drew out in office.  We discussed the potential causes of the issue, as well as the treatment options, which are largely nonoperative.  We discussed that a mixture of anti-inflammatory analgesics, cortisone injections, as well as consistent physical therapy for 4-6 weeks is the standard of care of this issue. Evidence indicates that over 90% of patients can begin to resolve their issues in that time.   The patient will proceed with PT.  The patient will then follow up in six weeks for a clinical check.     Chief Complaint: Right hip pain    HPI: This is a very pleasant 73 year old female who complains of right hip pain which is activity dependent. The patient has had activity dependent pain for several months.     The pain is located in the lateral aspect of the hip, it radiates somewhat distally and laterally, it is severe in intensity.  The patient complains of difficulty sleeping on the right side, limitation in the normal activities of daily living.   The patient has tried activity modification, no over the counter pain medications, no physical therapy, injections have not been done.  no other surgery or pertinent history to this hip.      Past Medical History:   Diagnosis Date    Allergic rhinitis     Anemia     Basal cell carcinoma     tip of nose and cheek    Carcinoma (HCC)     tip of nose & cheek    Chronic kidney disease     Dr. Reich; Stage III    Eczema     intrinsic    History of kidney stones     HTN (hypertension)     Hyperlipidemia     IDALMIS (obstructive sleep apnea)     non-compliant    Osteopenia     Osteoporosis     Pigmentary retinopathy     Type II or unspecified type diabetes mellitus without mention of complication, uncontrolled     Unspecified hypothyroidism        Past Surgical History:   Procedure Laterality Date    ABDOMEN SURGERY      BASAL CELL

## 2024-01-30 NOTE — PROGRESS NOTES
Identified pt with two pt identifiers (name and ). Reviewed chart in preparation for visit and have obtained necessary documentation.    Deysi Honeycutt is a 73 y.o. female Hip Pain (Right hip pain)  .    Vitals:    24 0902   BP: 137/78   Site: Right Upper Arm   Position: Sitting   Cuff Size: Large Adult   Pulse: 68   Resp: 18   Temp: 97.6 °F (36.4 °C)   TempSrc: Oral   SpO2: 98%   Weight: 82.6 kg (182 lb)   Height: 1.575 m (5' 2\")          1. Have you been to the ER, urgent care clinic since your last visit?  Hospitalized since your last visit?  no     2. Have you seen or consulted any other health care providers outside of the [facility] since your last visit?  Include any pap smears or colon screening.  no

## 2024-02-07 ENCOUNTER — HOSPITAL ENCOUNTER (OUTPATIENT)
Facility: HOSPITAL | Age: 74
Setting detail: RECURRING SERIES
Discharge: HOME | End: 2024-02-10
Payer: MEDICARE

## 2024-02-07 PROCEDURE — 97162 PT EVAL MOD COMPLEX 30 MIN: CPT | Performed by: PHYSICAL THERAPIST

## 2024-02-07 PROCEDURE — 97110 THERAPEUTIC EXERCISES: CPT | Performed by: PHYSICAL THERAPIST

## 2024-02-07 NOTE — THERAPY EVALUATION
[facility]cal[facility] Physical Therapy  [address]  [place] [address]  Phone: [phone]   Fax: [phone]          PHYSICAL THERAPY - MEDICARE EVALUATION/PLAN OF CARE NOTE (updated 3/23)      Date: 2024          Patient Name:  Deysi Honeycutt :  1950   Medical   Diagnosis:  Greater trochanteric pain syndrome [M25.559] Treatment Diagnosis:  M25.551  RIGHT HIP PAIN, M79.651  Pain in right thigh, and M79.661  Pain in right lower leg    Referral Source:  Quincy Mckeon PA Provider #:  [id number]                Insurance: Payor: MEDICARE / Plan: MEDICARE PART A AND B / Product Type: *No Product type* /      Patient  verified yes     Visit #   Current  / Total 1 24   Time   In / Out 838 938   Total Treatment Time 60   Total Timed Codes 15   1:1 Treatment Time 15      [facility] Totals Reminder:  bill using total billable   min of TIMED therapeutic procedures and modalities.   8-22 min = 1 unit; 23-37 min = 2 units; 38-52 min = 3 units;  53-67 min = 4 units; 68-82 min = 5 units     “The Student Physical Therapist participated in the delivery of services under the direction of Naye Hill PT, DPT; directing the service, making the skilled judgment, and who is responsible for the supervision of treatment.”       SUBJECTIVE  Pain Level (0-10 scale): 5/10 ache in the R lateral side of hip with occasionally traveling into groin or down leg (no numbness/tingling report)   []constant []intermittent []improving [x]worsening []no change since onset    Any medication changes, allergies to medications, adverse drug reactions, diagnosis change, or new procedure performed?: [x] No    [] Yes (see summary sheet for update)  Medications: Verified on Patient Summary List    Subjective functional status/changes:       Pt reports she saw Dr. Drew for R hip pain that started about a month ago. Had x-rays with no indication of any structural damage.

## 2024-02-09 ENCOUNTER — HOSPITAL ENCOUNTER (OUTPATIENT)
Facility: HOSPITAL | Age: 74
Setting detail: RECURRING SERIES
Discharge: HOME | End: 2024-02-12
Payer: MEDICARE

## 2024-02-09 PROCEDURE — 97110 THERAPEUTIC EXERCISES: CPT | Performed by: PHYSICAL THERAPIST

## 2024-02-09 PROCEDURE — 97140 MANUAL THERAPY 1/> REGIONS: CPT | Performed by: PHYSICAL THERAPIST

## 2024-02-09 NOTE — PROGRESS NOTES
PHYSICAL THERAPY - MEDICARE DAILY TREATMENT NOTE (updated 3/23)      Date: 2024          Patient Name:  Deysi Honeycutt :  1950   Medical   Diagnosis:  Greater trochanteric pain syndrome [M25.559] Treatment Diagnosis:  M25.551  RIGHT HIP PAIN, M79.651  Pain in right thigh, and M79.661  Pain in right lower leg    Referral Source:  Quincy Mckeon PA Insurance:   Payor: MEDICARE / Plan: MEDICARE PART A AND B / Product Type: *No Product type* /                     Patient  verified yes     Visit #   Current  / Total 2 24   Time   In / Out 937 1028   Total Treatment Time 51   Total Timed Codes 51   1:1 Treatment Time 26      [facility] Totals Reminder:  bill using total billable   min of TIMED therapeutic procedures and modalities.   8-22 min = 1 unit; 23-37 min = 2 units; 38-52 min = 3 units; 53-67 min = 4 units; 68-82 min = 5 units        “The Student Physical Therapist participated in the delivery of services under the direction of Naye Hill PT, DPT; directing the service, making the skilled judgment, and who is responsible for the supervision of treatment.”     SUBJECTIVE    Pain Level (0-10 scale): 2/10 in R lateral hip     Any medication changes, allergies to medications, adverse drug reactions, diagnosis change, or new procedure performed?: [x] No    [] Yes (see summary sheet for update)  Medications: Verified on Patient Summary List    Subjective functional status/changes:     Pt reports she felt good after previous session. Notes she was able to complete the HEP but had to hang onto the counter top a lot with tandem stance balance. Still notes soreness in her R hip but reports overall improvements in symptoms.    OBJECTIVE      Therapeutic Procedures:  Tx Min Billable or 1:1 Min (if diff from Tx Min) Procedure, Rationale, Specifics   10 10 21024 Manual Therapy (timed):  decrease pain, increase ROM, increase tissue extensibility, decrease edema, and decrease trigger points to improve

## 2024-02-14 ENCOUNTER — HOSPITAL ENCOUNTER (OUTPATIENT)
Facility: HOSPITAL | Age: 74
Setting detail: RECURRING SERIES
Discharge: HOME | End: 2024-02-17
Payer: MEDICARE

## 2024-02-14 PROCEDURE — 97110 THERAPEUTIC EXERCISES: CPT | Performed by: PHYSICAL THERAPIST

## 2024-02-14 PROCEDURE — 97140 MANUAL THERAPY 1/> REGIONS: CPT | Performed by: PHYSICAL THERAPIST

## 2024-02-14 NOTE — PROGRESS NOTES
PHYSICAL THERAPY - MEDICARE DAILY TREATMENT NOTE (updated 3/23)      Date: 2024          Patient Name:  Deysi Honeycutt :  1950   Medical   Diagnosis:  Greater trochanteric pain syndrome [M25.559] Treatment Diagnosis:  M25.551  RIGHT HIP PAIN, M79.651  Pain in right thigh, and M79.661  Pain in right lower leg    Referral Source:  Quincy Mckeon PA Insurance:   Payor: MEDICARE / Plan: MEDICARE PART A AND B / Product Type: *No Product type* /                     Patient  verified yes     Visit #   Current  / Total 3 24   Time   In / Out 133 228   Total Treatment Time 55   Total Timed Codes 55   1:1 Treatment Time 40      [facility] Totals Reminder:  bill using total billable   min of TIMED therapeutic procedures and modalities.   8-22 min = 1 unit; 23-37 min = 2 units; 38-52 min = 3 units; 53-67 min = 4 units; 68-82 min = 5 units        “The Student Physical Therapist participated in the delivery of services under the direction of Naye Hill PT, DPT; directing the service, making the skilled judgment, and who is responsible for the supervision of treatment.”     SUBJECTIVE    Pain Level (0-10 scale): 2/10 in R lateral hip     Any medication changes, allergies to medications, adverse drug reactions, diagnosis change, or new procedure performed?: [x] No    [] Yes (see summary sheet for update)  Medications: Verified on Patient Summary List    Subjective functional status/changes:     Pt reports she had to run several errands after previous session and was pretty wiped out upon getting home. Notes she recovered after a day or two and has been feeling good since. Noted her R hip \"reminded me it was still there\" getting out of her car to come into PT session today.     OBJECTIVE      Therapeutic Procedures:  Tx Min Billable or 1:1 Min (if diff from Tx Min) Procedure, Rationale, Specifics   14 14 [id number] Manual Therapy (timed):  decrease pain, increase ROM, increase tissue extensibility, decrease edema,

## 2024-02-19 ENCOUNTER — HOSPITAL ENCOUNTER (OUTPATIENT)
Facility: HOSPITAL | Age: 74
Setting detail: RECURRING SERIES
Discharge: HOME | End: 2024-02-22
Payer: MEDICARE

## 2024-02-19 PROCEDURE — 97110 THERAPEUTIC EXERCISES: CPT | Performed by: PHYSICAL THERAPIST

## 2024-02-19 PROCEDURE — 97140 MANUAL THERAPY 1/> REGIONS: CPT | Performed by: PHYSICAL THERAPIST

## 2024-02-19 NOTE — PROGRESS NOTES
PHYSICAL THERAPY - MEDICARE DAILY TREATMENT NOTE (updated 3/23)    Date: 2024        Patient Name:  Deysi Honeycutt :  1950   Medical   Diagnosis:  Greater trochanteric pain syndrome [M25.559] Treatment Diagnosis:  M25.551  RIGHT HIP PAIN, M79.651  Pain in right thigh, and M79.661  Pain in right lower leg    Referral Source:  Quincy Mckeon PA Insurance:   Payor: MEDICARE / Plan: MEDICARE PART A AND B / Product Type: *No Product type* /                   Patient  verified yes     Visit #   Current  / Total 4 24   Time   In / Out 09 1007   Total Treatment Time 62   Total Timed Codes 54   1:1 Treatment Time 46      [facility] Totals Reminder:  bill using total billable   min of TIMED therapeutic procedures and modalities.   8-22 min = 1 unit; 23-37 min = 2 units; 38-52 min = 3 units; 53-67 min = 4 units; 68-82 min = 5 units        SUBJECTIVE    Pain Level (0-10 scale): 6 in L-sided neck    Any medication changes, allergies to medications, adverse drug reactions, diagnosis change, or new procedure performed?: [x] No    [] Yes (see summary sheet for update)  Medications: Verified on Patient Summary List    Subjective functional status/changes:       The patient reports she has overall been feeling \"much better\" related to familiar R hip discomfort; she did have some discomfort in the front of her R hip when rising after sitting for a longer period in a pew and then ascending stairs to the choir loft yesterday. She notes she feels approximately 80% back to prior level of function related to current condition at this point; she continues to note some R hip discomfort with stepping up onto a curb using that leg. She does report progressively increasing L-sided neck pain since yesterday and into today, perhaps worsened with sleep position.    OBJECTIVE    Therapeutic Procedures:  Tx Min Billable or 1:1 Min (if diff from Tx Min) Procedure, Rationale, Specifics   39 34 [id number] Therapeutic Exercise (timed):

## 2024-02-22 ENCOUNTER — HOSPITAL ENCOUNTER (OUTPATIENT)
Facility: HOSPITAL | Age: 74
Setting detail: RECURRING SERIES
Discharge: HOME | End: 2024-02-25
Payer: MEDICARE

## 2024-02-22 PROCEDURE — 97110 THERAPEUTIC EXERCISES: CPT | Performed by: PHYSICAL THERAPIST

## 2024-02-22 PROCEDURE — 97140 MANUAL THERAPY 1/> REGIONS: CPT | Performed by: PHYSICAL THERAPIST

## 2024-02-22 NOTE — PROGRESS NOTES
PHYSICAL THERAPY - MEDICARE DAILY TREATMENT NOTE (updated 3/23)    Date: 2024        Patient Name:  Deysi Honeycutt :  1950   Medical   Diagnosis:  Greater trochanteric pain syndrome [M25.559] Treatment Diagnosis:  M25.551  RIGHT HIP PAIN, M79.651  Pain in right thigh, and M79.661  Pain in right lower leg    Referral Source:  Quincy Mckeon PA Insurance:   Payor: MEDICARE / Plan: MEDICARE PART A AND B / Product Type: *No Product type* /                   Patient  verified yes     Visit #   Current  / Total 5 24   Time   In / Out 1004 1054   Total Treatment Time 50   Total Timed Codes 50   1:1 Treatment Time 34      [facility] Totals Reminder:  bill using total billable   min of TIMED therapeutic procedures and modalities.   8-22 min = 1 unit; 23-37 min = 2 units; 38-52 min = 3 units; 53-67 min = 4 units; 68-82 min = 5 units        SUBJECTIVE    Pain Level (0-10 scale): 1.5 in R lateral hip    Any medication changes, allergies to medications, adverse drug reactions, diagnosis change, or new procedure performed?: [x] No    [] Yes (see summary sheet for update)  Medications: Verified on Patient Summary List    Subjective functional status/changes:       The patient reports she was tired but did well after last visit; she took a 1.5-hour nap afterward. She notes she has continued to intermittently work on home exercises since last visit. She continues to feel some R hip \"catching\" when first getting up from sitting, and discomfort when lying on R side and trying to sit on her R leg.    OBJECTIVE    Therapeutic Procedures:  Tx Min Billable or 1:1 Min (if diff from Tx Min) Procedure, Rationale, Specifics   36 20 39203 Therapeutic Exercise (timed):  increase ROM, strength, coordination, balance, and proprioception to improve patient's ability to progress to PLOF and address remaining functional goals. (see flow sheet as applicable)     Details if applicable:      71196 Manual Therapy (timed):  decrease

## 2024-02-26 ENCOUNTER — HOSPITAL ENCOUNTER (OUTPATIENT)
Facility: HOSPITAL | Age: 74
Setting detail: RECURRING SERIES
Discharge: HOME | End: 2024-02-29
Payer: MEDICARE

## 2024-02-26 PROCEDURE — 97110 THERAPEUTIC EXERCISES: CPT

## 2024-02-26 PROCEDURE — 97140 MANUAL THERAPY 1/> REGIONS: CPT

## 2024-02-26 NOTE — PROGRESS NOTES
detail:    [] Other detail:       Other Objective/Functional Measures    None noted    Pain Level at end of session (0-10 scale): 0/10    Assessment   The Pt tolerated her therapy program well. She fatigued quickly following the eccentric total gym leg press, but there was no increased pain. She did a few stretches after this and was able to then return to strengthening exercises well.   Patient will continue to benefit from skilled PT / OT services to modify and progress therapeutic interventions, analyze and address functional mobility deficits, analyze and address ROM deficits, analyze and address strength deficits, analyze and address soft tissue restrictions, analyze and cue for proper movement patterns, analyze and modify for postural abnormalities, analyze and address imbalance/dizziness, and instruct in home and community integration to address functional deficits and attain remaining goals.    Progress toward goals / Updated goals:  []  See Progress Note/Recertification    Short Term Goals: To be accomplished in 8-12 treatments.  Pt will self-report compliance with HEP to demonstrate self-management of symptoms outside of PT sessions. - Met  Pt will self-report </=3/10 pain in R hip for >/=3/7 days of the week for improvements in overall quality of life. - Progressing  Pt will improve SLS time to >/=10 seconds to improve ability to don/doff socks/hose. - Progressing     Long Term Goals: To be accomplished in 20-24 treatments.  Pt will self-report </=1/10 pain in R hip for >/=5/7 days of the week for improvements in overall quality of life. - Progressing  Pt will increase MMT of R hip abductors/adductors to assist with ability to ascend/descend stairs. - Progressing  Pt will improve 30\"STS to 13 repetitions, pain-free, to demonstrate improvements in ability to complete functional transfers. - Progressing  Pt will score >/=68 on FOTO to demonstrate overall improvement in self-reported functional status. -

## 2024-02-29 ENCOUNTER — APPOINTMENT (OUTPATIENT)
Facility: HOSPITAL | Age: 74
End: 2024-02-29
Payer: MEDICARE

## 2024-03-04 ENCOUNTER — HOSPITAL ENCOUNTER (OUTPATIENT)
Facility: HOSPITAL | Age: 74
Setting detail: RECURRING SERIES
Discharge: HOME | End: 2024-03-07
Payer: MEDICARE

## 2024-03-04 PROCEDURE — 97110 THERAPEUTIC EXERCISES: CPT | Performed by: PHYSICAL THERAPIST

## 2024-03-04 PROCEDURE — 97140 MANUAL THERAPY 1/> REGIONS: CPT | Performed by: PHYSICAL THERAPIST

## 2024-03-04 NOTE — PROGRESS NOTES
PHYSICAL THERAPY - MEDICARE DAILY TREATMENT NOTE (updated 3/23)    Date: 3/4/2024        Patient Name:  Deysi Honeycutt :  1950   Medical   Diagnosis:  Greater trochanteric pain syndrome [M25.559] Treatment Diagnosis:  M25.551  RIGHT HIP PAIN, M79.651  Pain in right thigh, and M79.661  Pain in right lower leg    Referral Source:  Quincy Mckeon PA Insurance:   Payor: MEDICARE / Plan: MEDICARE PART A AND B / Product Type: *No Product type* /                   Patient  verified yes     Visit #   Current  / Total 7 24   Time   In / Out 0801 918   Total Treatment Time 77   Total Timed Codes 67   1:1 Treatment Time 56      [facility] Totals Reminder:  bill using total billable   min of TIMED therapeutic procedures and modalities.   8-22 min = 1 unit; 23-37 min = 2 units; 38-52 min = 3 units; 53-67 min = 4 units; 68-82 min = 5 units        “The Student Physical Therapist participated in the delivery of services under the direction of Naye Hill PT, DPT; directing the service, making the skilled judgment, and who is responsible for the supervision of treatment.”      SUBJECTIVE    Pain Level (0-10 scale): 8/10 R sided groin.     Any medication changes, allergies to medications, adverse drug reactions, diagnosis change, or new procedure performed?: [x] No    [] Yes (see summary sheet for update)  Medications: Verified on Patient Summary List    Subjective functional status/changes:     The pt reports she was quite sore after PT session last Monday. Notes continued soreness throughout the week - had a couple days where she did some increased walking. Having increased R groin discomfort today.     OBJECTIVE    Therapeutic Procedures:  Tx Min Billable or 1:1 Min (if diff from Tx Min) Procedure, Rationale, Specifics   52 84 [id number] Therapeutic Exercise (timed):  increase ROM, strength, coordination, balance, and proprioception to improve patient's ability to progress to PLOF and address remaining functional goals.

## 2024-03-07 ENCOUNTER — HOSPITAL ENCOUNTER (OUTPATIENT)
Facility: HOSPITAL | Age: 74
Setting detail: RECURRING SERIES
Discharge: HOME | End: 2024-03-10
Payer: MEDICARE

## 2024-03-07 PROCEDURE — 97110 THERAPEUTIC EXERCISES: CPT

## 2024-03-07 NOTE — PROGRESS NOTES
PHYSICAL THERAPY - MEDICARE DAILY TREATMENT NOTE (updated 3/23)    Date: 3/7/2024        Patient Name:  Deysi Honeycutt :  1950   Medical   Diagnosis:  Greater trochanteric pain syndrome [M25.559] Treatment Diagnosis:  M25.551  RIGHT HIP PAIN, M79.651  Pain in right thigh, and M79.661  Pain in right lower leg    Referral Source:  Quincy Mckeon PA Insurance:   Payor: MEDICARE / Plan: MEDICARE PART A AND B / Product Type: *No Product type* /                   Patient  verified yes     Visit #   Current  / Total 8 24   Time   In / Out 1:30 PM 2:17 PM   Total Treatment Time 47 minutes   Total Timed Codes 47 minutes   1:1 Treatment Time 47 minutes      [facility] Totals Reminder:  bill using total billable   min of TIMED therapeutic procedures and modalities.   8-22 min = 1 unit; 23-37 min = 2 units; 38-52 min = 3 units; 53-67 min = 4 units; 68-82 min = 5 units            SUBJECTIVE    Pain Level (0-10 scale): 1/10.     Any medication changes, allergies to medications, adverse drug reactions, diagnosis change, or new procedure performed?: [x] No    [] Yes (see summary sheet for update)  Medications: Verified on Patient Summary List    Subjective functional status/changes:     The Pt reports that her hip is not bothering her greatly today, but she woke up and her R knee was bothering her (her knee has flared up in the past and it is 1-2days and is unpredictable). Currently, the posterior/lateral hip pain has improved greatly and the majority of the pain in the R anterior hip/groin.  She denies any radicular pains, numbness, tingling, or weakness in the RLE. The majority of the time that the R posterior/lateral hip pain is noticed is when she is lying on the R hip; she can only stay on it for 20-30 minutes before the pain increases and she has to change positions. This pain is preventing her from being able to fall asleep and get comfortable; she is a R-side sleeper. The R anterior groin pain is aggravating during

## 2024-03-07 NOTE — PROGRESS NOTES
[facility]cal[facility] Physical Therapy  [address]  [place] [address]  Phone: [phone]   Fax: [phone]     PHYSICAL THERAPY PROGRESS NOTE  Patient Name:  Deysi Honeycutt :  1950   Treatment/Medical Diagnosis: Greater trochanteric pain syndrome [M25.559]   Referral Source:  Quincy Mckeon PA     Date of Initial Visit:  24 Attended Visits:  8 Missed Visits:  1     SUMMARY OF TREATMENT/ASSESSMENT:  The Pt has been seen for 8 outpatient physical therapy sessions with R hip pain. The Pt's therapy program has focused on improving her hip flexibility and mobility, improving her hip strength and control, improving her balance and neuromuscular control, and improving her activity tolerance and endurance via therapeutic exercises, manual therapy techniques, and pain relieving modalities. The Pt original complaint of lateral hip pain has improved greatly.  She no longer notices this pain during activity and only notices when lying on the R side and cannot lie on it longer than 20-30 minutes; she is a R side sleeper so it is effecting her sleep. The majority of her hip pain is in the anterior R hip/groin, is TTP along the R iliopsoas, and she denies any radicular pains, numbness, tingling, or weakness in her RLE. This pain is aggravated with any transition from sit to stand. She has noticed improvement with her balance, but still is fearful of falling when all the weight in is the RLE. Overall, she believes that she is 60% improved since beginning therapy.     CURRENT STATUS/GOALS:    FOTO 58/100  30s STS- 8 (no UE assist, low mat)  SLS- R- 1s, L- 3s  TTP along R anterior groin along iliopsoas     Hip MMT:                    Right                Left  Flexion:                       4                      4+  ER:                              4-                     4  IR:                                4                      4+  Abduction:

## 2024-03-11 ENCOUNTER — HOSPITAL ENCOUNTER (OUTPATIENT)
Facility: HOSPITAL | Age: 74
Setting detail: RECURRING SERIES
Discharge: HOME | End: 2024-03-14
Payer: MEDICARE

## 2024-03-11 PROCEDURE — 97140 MANUAL THERAPY 1/> REGIONS: CPT | Performed by: PHYSICAL THERAPIST

## 2024-03-11 PROCEDURE — 97110 THERAPEUTIC EXERCISES: CPT | Performed by: PHYSICAL THERAPIST

## 2024-03-11 NOTE — PROGRESS NOTES
PHYSICAL THERAPY - MEDICARE DAILY TREATMENT NOTE (updated 3/23)    Date: 3/11/2024        Patient Name:  Deysi Honeycutt :  1950   Medical   Diagnosis:  Greater trochanteric pain syndrome [M25.559] Treatment Diagnosis:  M25.551  RIGHT HIP PAIN, M79.651  Pain in right thigh, and M79.661  Pain in right lower leg    Referral Source:  Quincy Mckeon PA Insurance:   Payor: MEDICARE / Plan: MEDICARE PART A AND B / Product Type: *No Product type* /                   Patient  verified yes     Visit #   Current  / Total 9 24   Time   In / Out 1107 1200   Total Treatment Time 53   Total Timed Codes 53   1:1 Treatment Time 34      [facility] Totals Reminder:  bill using total billable   min of TIMED therapeutic procedures and modalities.   8-22 min = 1 unit; 23-37 min = 2 units; 38-52 min = 3 units; 53-67 min = 4 units; 68-82 min = 5 units        “The Student Physical Therapist participated in the delivery of services under the direction of Naye Hill PT, DPT; directing the service, making the skilled judgment, and who is responsible for the supervision of treatment.”      SUBJECTIVE    Pain Level (0-10 scale): 3/10 in R groin    Any medication changes, allergies to medications, adverse drug reactions, diagnosis change, or new procedure performed?: [x] No    [] Yes (see summary sheet for update)  Medications: Verified on Patient Summary List    Subjective functional status/changes:     The pt reports feeling ok after last session. Notes she's feeling better than she was last week. Continues to have intermittent R groin pain when doing sit<>stands over the weekend - mostly when she sits for 15-20 minutes and then goes to stand.     OBJECTIVE    Therapeutic Procedures:  Tx Min Billable or 1:1 Min (if diff from Tx Min) Procedure, Rationale, Specifics   41 22 60224 Therapeutic Exercise (timed):  increase ROM, strength, coordination, balance, and proprioception to improve patient's ability to progress to PLOF and

## 2024-03-13 ENCOUNTER — OFFICE VISIT (OUTPATIENT)
Age: 74
End: 2024-03-13
Payer: MEDICARE

## 2024-03-13 VITALS — BODY MASS INDEX: 33.28 KG/M2 | HEIGHT: 62 IN

## 2024-03-13 DIAGNOSIS — M25.559 GREATER TROCHANTERIC PAIN SYNDROME: Primary | ICD-10-CM

## 2024-03-13 PROCEDURE — G8399 PT W/DXA RESULTS DOCUMENT: HCPCS | Performed by: ORTHOPAEDIC SURGERY

## 2024-03-13 PROCEDURE — G8484 FLU IMMUNIZE NO ADMIN: HCPCS | Performed by: ORTHOPAEDIC SURGERY

## 2024-03-13 PROCEDURE — 1090F PRES/ABSN URINE INCON ASSESS: CPT | Performed by: ORTHOPAEDIC SURGERY

## 2024-03-13 PROCEDURE — G8427 DOCREV CUR MEDS BY ELIG CLIN: HCPCS | Performed by: ORTHOPAEDIC SURGERY

## 2024-03-13 PROCEDURE — G8417 CALC BMI ABV UP PARAM F/U: HCPCS | Performed by: ORTHOPAEDIC SURGERY

## 2024-03-13 PROCEDURE — 1036F TOBACCO NON-USER: CPT | Performed by: ORTHOPAEDIC SURGERY

## 2024-03-13 PROCEDURE — 99212 OFFICE O/P EST SF 10 MIN: CPT | Performed by: ORTHOPAEDIC SURGERY

## 2024-03-13 PROCEDURE — 3017F COLORECTAL CA SCREEN DOC REV: CPT | Performed by: ORTHOPAEDIC SURGERY

## 2024-03-13 PROCEDURE — 1123F ACP DISCUSS/DSCN MKR DOCD: CPT | Performed by: ORTHOPAEDIC SURGERY

## 2024-03-13 ASSESSMENT — PATIENT HEALTH QUESTIONNAIRE - PHQ9
SUM OF ALL RESPONSES TO PHQ QUESTIONS 1-9: 0
SUM OF ALL RESPONSES TO PHQ9 QUESTIONS 1 & 2: 0
1. LITTLE INTEREST OR PLEASURE IN DOING THINGS: 0
2. FEELING DOWN, DEPRESSED OR HOPELESS: 0

## 2024-03-13 NOTE — PROGRESS NOTES
Identified pt with two pt identifiers (name and ). Reviewed chart in preparation for visit and have obtained necessary documentation.    Deysi Honeycutt is a 73 y.o. female Follow-up (Right Hip )  .    Vitals:    24 0803   Height: 1.575 m (5' 2.01\")          1. Have you been to the ER, urgent care clinic since your last visit?  Hospitalized since your last visit?  no     2. Have you seen or consulted any other health care providers outside of the [facility] since your last visit?  Include any pap smears or colon screening.  no  
BREAKFAST, Disp: 90 tablet, Rfl: 1    rosuvastatin (CRESTOR) 20 MG tablet, TAKE 1 TABLET AT BEDTIME, Disp: 90 tablet, Rfl: 3    fenofibrate (TRIGLIDE) 160 MG tablet, TAKE 1 TABLET BY MOUTH DAILY FOR HIGH CHOLESTEROL, Disp: 90 tablet, Rfl: 1    JANUVIA 100 MG tablet, TAKE 1 TABLET BY MOUTH DAILY, Disp: 90 tablet, Rfl: 1    cetirizine (ZYRTEC) 10 MG tablet, Take 1 tablet by mouth daily, Disp: , Rfl:     Probiotic Product (PROBIOTIC PO), Take by mouth daily, Disp: , Rfl:     Urea (CARMOL) 40 % cream, Apply topically as needed, Disp: , Rfl:     gabapentin (NEURONTIN) 300 MG capsule, TAKE ONE CAPSULE BY MOUTH EACH MORNING, TAKE 2 CAPSULES AT BEDTIME., Disp: 270 capsule, Rfl: 1    Multiple Vitamins-Minerals (WOMENS MULTIVITAMIN PO), Take by mouth daily, Disp: , Rfl:     clobetasol (TEMOVATE) 0.05 % cream, APPLY TOPICALLY TO THE AFFECTED AREA TWICE DAILY, Disp: 30 g, Rfl: 2    fluticasone (FLONASE) 50 MCG/ACT nasal spray, nightly., Disp: , Rfl:     All:  Allergies   Allergen Reactions    Niacin Other (See Comments)     \"Burning sensation and my skin gets flush\"    Sulfa Antibiotics Other (See Comments)     \"I don't remember\"       Social Hx:  Social History     Socioeconomic History    Marital status:      Spouse name: None    Number of children: None    Years of education: None    Highest education level: None   Tobacco Use    Smoking status: Never     Passive exposure: Never    Smokeless tobacco: Never   Vaping Use    Vaping Use: Never used   Substance and Sexual Activity    Alcohol use: Not Currently    Drug use: No   Social History Narrative    Likes to read and play on the computer.    Lives in [place] with  of 39 years.  Has one daughter and one son.    Retired   at [employer].     Social Determinants of Health     Financial Resource Strain: Low Risk  (5/8/2023)    Overall Financial Resource Strain (CARDIA)     Difficulty of Paying Living Expenses: Not

## 2024-03-14 ENCOUNTER — HOSPITAL ENCOUNTER (OUTPATIENT)
Facility: HOSPITAL | Age: 74
Setting detail: RECURRING SERIES
Discharge: HOME | End: 2024-03-17
Payer: MEDICARE

## 2024-03-14 PROCEDURE — 97110 THERAPEUTIC EXERCISES: CPT

## 2024-03-14 PROCEDURE — 97112 NEUROMUSCULAR REEDUCATION: CPT

## 2024-03-14 NOTE — PROGRESS NOTES
PHYSICAL THERAPY - MEDICARE DAILY TREATMENT NOTE (updated 3/23)    Date: 3/14/2024        Patient Name:  Deysi Honeycutt :  1950   Medical   Diagnosis:  Greater trochanteric pain syndrome [M25.559] Treatment Diagnosis:  M25.551  RIGHT HIP PAIN, M79.651  Pain in right thigh, and M79.661  Pain in right lower leg    Referral Source:  Quincy Mckeon PA Insurance:   Payor: MEDICARE / Plan: MEDICARE PART A AND B / Product Type: *No Product type* /                   Patient  verified yes     Visit #   Current  / Total 10 24   Time   In / Out 9:00 AM 9:46 AM   Total Treatment Time 46 minutes   Total Timed Codes 46 minutes   1:1 Treatment Time 38 minutes      [facility] Totals Reminder:  bill using total billable   min of TIMED therapeutic procedures and modalities.   8-22 min = 1 unit; 23-37 min = 2 units; 38-52 min = 3 units; 53-67 min = 4 units; 68-82 min = 5 units            SUBJECTIVE    Pain Level (0-10 scale): 0/10.     Any medication changes, allergies to medications, adverse drug reactions, diagnosis change, or new procedure performed?: [x] No    [] Yes (see summary sheet for update)  Medications: Verified on Patient Summary List    Subjective functional status/changes:     The Pt had her f/u with Dr. Drew yesterday and had a good appointment.  He is pleased with her results so far with therapy and recommended that she continue with therapy till the end of March and then continue independently with her HEP.  She did have very mild pain in the R anterior groin \"in the crease\" yesterday, but it was intermittent and not constant.  She did have pain when lying on the R hip last night and had to change positions.  She is having no pain this morning.     OBJECTIVE    Therapeutic Procedures:  Tx Min Billable or 1:1 Min (if diff from Tx Min) Procedure, Rationale, Specifics   [phone] [id number] Therapeutic Exercise (timed):  increase ROM, strength, coordination, balance, and proprioception to improve patient's ability to

## 2024-03-15 ENCOUNTER — TRANSCRIBE ORDERS (OUTPATIENT)
Facility: HOSPITAL | Age: 74
End: 2024-03-15

## 2024-03-15 DIAGNOSIS — Z12.31 SCREENING MAMMOGRAM FOR HIGH-RISK PATIENT: Primary | ICD-10-CM

## 2024-03-18 ENCOUNTER — HOSPITAL ENCOUNTER (OUTPATIENT)
Facility: HOSPITAL | Age: 74
Setting detail: RECURRING SERIES
Discharge: HOME | End: 2024-03-21
Payer: MEDICARE

## 2024-03-18 PROCEDURE — 97110 THERAPEUTIC EXERCISES: CPT | Performed by: PHYSICAL THERAPIST

## 2024-03-18 PROCEDURE — 97140 MANUAL THERAPY 1/> REGIONS: CPT | Performed by: PHYSICAL THERAPIST

## 2024-03-18 NOTE — PROGRESS NOTES
PHYSICAL THERAPY - MEDICARE DAILY TREATMENT NOTE (updated 3/23)    Date: 3/18/2024        Patient Name:  Deysi Honeycutt :  1950   Medical   Diagnosis:  Greater trochanteric pain syndrome [M25.559] Treatment Diagnosis:  M25.551  RIGHT HIP PAIN, M79.651  Pain in right thigh, and M79.661  Pain in right lower leg    Referral Source:  Quincy Mckeon PA Insurance:   Payor: MEDICARE / Plan: MEDICARE PART A AND B / Product Type: *No Product type* /                   Patient  verified yes     Visit #   Current  / Total 11 24   Time   In / Out 804 850   Total Treatment Time 46   Total Timed Codes 46   1:1 Treatment Time 25      [facility] Totals Reminder:  bill using total billable   min of TIMED therapeutic procedures and modalities.   8-22 min = 1 unit; 23-37 min = 2 units; 38-52 min = 3 units; 53-67 min = 4 units; 68-82 min = 5 units      “The Student Physical Therapist participated in the delivery of services under the direction of Naye Hill PT, DPT; directing the service, making the skilled judgment, and who is responsible for the supervision of treatment.”        SUBJECTIVE    Pain Level (0-10 scale): 0/10.     Any medication changes, allergies to medications, adverse drug reactions, diagnosis change, or new procedure performed?: [x] No    [] Yes (see summary sheet for update)  Medications: Verified on Patient Summary List    Subjective functional status/changes:     Pt notes she had some twinges in her R hip over the weekend. Notes some \"instep\" pain on her R ankle - unrelated to PT.     OBJECTIVE    Therapeutic Procedures:  Tx Min Billable or 1:1 Min (if diff from Tx Min) Procedure, Rationale, Specifics   [phone] [id number] Therapeutic Exercise (timed):  increase ROM, strength, coordination, balance, and proprioception to improve patient's ability to progress to PLOF and address remaining functional goals. (see flow sheet as applicable)     Details if applicable:     10 [phone] [id number] Manual Therapy (timed):

## 2024-03-21 ENCOUNTER — HOSPITAL ENCOUNTER (OUTPATIENT)
Facility: HOSPITAL | Age: 74
Setting detail: RECURRING SERIES
Discharge: HOME | End: 2024-03-24
Payer: MEDICARE

## 2024-03-21 PROCEDURE — 97110 THERAPEUTIC EXERCISES: CPT

## 2024-03-21 PROCEDURE — 97140 MANUAL THERAPY 1/> REGIONS: CPT

## 2024-03-21 NOTE — PROGRESS NOTES
adverse reaction:        [x]  Patient Education billed concurrently with other procedures   [x] Review HEP    [] Progressed/Changed HEP, detail:    [] Other detail:       Other Objective/Functional Measures  None noted    Pain Level at end of session (0-10 scale): 0/10    Assessment   The Pt tolerated the increased resistance band well with appropriate muscle activation/fatigue noted. She continues to have difficulty with the wobbleboard taps (A/P > M/L), but is otherwise progressing nicely with her exercise program.   Patient will continue to benefit from skilled PT / OT services to modify and progress therapeutic interventions, analyze and address functional mobility deficits, analyze and address ROM deficits, analyze and address strength deficits, analyze and address soft tissue restrictions, analyze and cue for proper movement patterns, analyze and modify for postural abnormalities, analyze and address imbalance/dizziness, and instruct in home and community integration to address functional deficits and attain remaining goals.    Progress toward goals / Updated goals:  []  See Progress Note/Recertification    Short Term Goals: To be accomplished in 8-12 treatments.  Pt will self-report compliance with HEP to demonstrate self-management of symptoms outside of PT sessions. - Met  Pt will self-report </=3/10 pain in R hip for >/=3/7 days of the week for improvements in overall quality of life. - Progressing  Pt will improve SLS time to >/=10 seconds to improve ability to don/doff socks/hose. - Progressing     Long Term Goals: To be accomplished in 20-24 treatments.  Pt will self-report </=1/10 pain in R hip for >/=5/7 days of the week for improvements in overall quality of life. - Progressing  Pt will increase MMT of R hip abductors/adductors to assist with ability to ascend/descend stairs. - Progressing  Pt will improve 30\"STS to 13 repetitions, pain-free, to demonstrate improvements in ability to complete

## 2024-03-25 ENCOUNTER — HOSPITAL ENCOUNTER (OUTPATIENT)
Facility: HOSPITAL | Age: 74
Setting detail: RECURRING SERIES
Discharge: HOME | End: 2024-03-28
Payer: MEDICARE

## 2024-03-25 PROCEDURE — 97110 THERAPEUTIC EXERCISES: CPT

## 2024-03-25 PROCEDURE — 97140 MANUAL THERAPY 1/> REGIONS: CPT

## 2024-03-25 NOTE — PROGRESS NOTES
PHYSICAL THERAPY - MEDICARE DAILY TREATMENT NOTE (updated 3/23)    Date: 3/25/2024        Patient Name:  Deysi Honeycutt :  1950   Medical   Diagnosis:  Greater trochanteric pain syndrome [M25.559] Treatment Diagnosis:  M25.551  RIGHT HIP PAIN, M79.651  Pain in right thigh, and M79.661  Pain in right lower leg    Referral Source:  Quincy Mckeon PA Insurance:   Payor: MEDICARE / Plan: MEDICARE PART A AND B / Product Type: *No Product type* /                   Patient  verified yes     Visit #   Current  / Total 13 24   Time   In / Out 9:30 AM 10:22 AM   Total Treatment Time 52 minutes   Total Timed Codes 52 minutes   1:1 Treatment Time 40 minutes      [facility] Totals Reminder:  bill using total billable   min of TIMED therapeutic procedures and modalities.   8-22 min = 1 unit; 23-37 min = 2 units; 38-52 min = 3 units; 53-67 min = 4 units; 68-82 min = 5 units        SUBJECTIVE    Pain Level (0-10 scale): 8/10.     Any medication changes, allergies to medications, adverse drug reactions, diagnosis change, or new procedure performed?: [x] No    [] Yes (see summary sheet for update)  Medications: Verified on Patient Summary List    Subjective functional status/changes:     The Pt reports that she was feeling good after last session.  She hosted an [employer] at her house on Saturday and was on her feet for a long period of time, but does not remember feeling any pain.  She woke up yesterday morning and had significant pain in the R posterior/lateral hip that radiates down the lateral side of the thigh and stops proximal to the knee. The pain is aggravated by walking, stairs, and transition from sit to stand; does not bother her when she is standing. She took Gabapentin yesterday and it did not change the pain, she has not taken anything else since then.     OBJECTIVE    Therapeutic Procedures:  Tx Min Billable or 1:1 Min (if diff from Tx Min) Procedure, Rationale, Specifics   [phone] [id number] Therapeutic

## 2024-03-27 ENCOUNTER — HOSPITAL ENCOUNTER (OUTPATIENT)
Facility: HOSPITAL | Age: 74
Setting detail: RECURRING SERIES
Discharge: HOME | End: 2024-03-30
Payer: MEDICARE

## 2024-03-27 PROCEDURE — 97530 THERAPEUTIC ACTIVITIES: CPT | Performed by: PHYSICAL THERAPIST

## 2024-03-27 NOTE — PROGRESS NOTES
PHYSICAL THERAPY - MEDICARE DAILY TREATMENT NOTE (updated 3/23)    Date: 3/27/2024        Patient Name:  Deysi Honeycutt :  1950   Medical   Diagnosis:  Greater trochanteric pain syndrome [M25.559] Treatment Diagnosis:  M25.551  RIGHT HIP PAIN, M79.651  Pain in right thigh, and M79.661  Pain in right lower leg    Referral Source:  Quincy Mckeon PA Insurance:   Payor: MEDICARE / Plan: MEDICARE PART A AND B / Product Type: *No Product type* /                   Patient  verified yes     Visit #   Current  / Total 14 24   Time   In / Out 204 242   Total Treatment Time 38   Total Timed Codes 38   1:1 Treatment Time 35      [facility] Totals Reminder:  bill using total billable   min of TIMED therapeutic procedures and modalities.   8-22 min = 1 unit; 23-37 min = 2 units; 38-52 min = 3 units; 53-67 min = 4 units; 68-82 min = 5 units        “The Student Physical Therapist participated in the delivery of services under the direction of Naye Hill PT, DPT; directing the service, making the skilled judgment, and who is responsible for the supervision of treatment.”      SUBJECTIVE    Pain Level (0-10 scale): 5/10 R posterior hip    Any medication changes, allergies to medications, adverse drug reactions, diagnosis change, or new procedure performed?: [x] No    [] Yes (see summary sheet for update)  Medications: Verified on Patient Summary List    Subjective functional status/changes:     The Pt reports that she felt ok after last session but has still continued to have increased R posterior hip pain over the past week. Notes she does everything she wants to do but has to deal with the pain. Reports 60% recovery with remaining percent related to pain with activity.     OBJECTIVE    Therapeutic Procedures:  Tx Min Billable or 1:1 Min (if diff from Tx Min) Procedure, Rationale, Specifics   [phone] [id number] Therapeutic Exercise (timed):  increase ROM, strength, coordination, balance, and proprioception to improve

## 2024-03-27 NOTE — THERAPY DISCHARGE
[facility]  [address]  [place] [address]  Phone: [phone]   Fax: [phone]     DISCHARGE SUMMARY  Patient Name: Deysi Honeycutt : 1950   Treatment/Medical Diagnosis: Greater trochanteric pain syndrome [M25.559]   Referral Source: Quincy Mckeon PA     Date of Initial Visit: 24 Attended Visits: 14 Missed Visits: 1     SUMMARY OF TREATMENT  Pt is a 74 y/o female who has completed 14 visits of PT to address subacute onset of R hip pain with occasional pain into her R thigh and/or into her R lower leg. Pt demonstrated slight improvements in multiplanar LE strength (most notably with bilateral multiplanar hip strength), improved her functional transfer abilities, and up until this week had noted a complete reduction of R hip/leg pain. She notes reduced compliance with HEP over the past couple of weeks that likely led to exacerbation of symptoms - pt educated on importance of compliance with HEP. She continues to have deficits to gross bilateral lower extremity strength and decreased balance bilaterally. She had met/intermittently met 4/7 goals and was showing progress toward all remaining goals. Pt notes she feels comfortable discharging from PT services to continue to self-manage symptoms using stretches/exercises provided in HEP. Pt discharged from formal PT services today.      30s STS- 10.5 (no UE assist, low mat)  SLS- R- <2s, L- <2s  TTP along R piriformis musculature     Hip MMT:                    Right                Left  Flexion:                       4                      4+  ER:                              4 (p! R lateral thigh)                     4+  IR:                                4+                      4+  Abduction:                   4                    4-    CURRENT STATUS  Short Term Goals: To be accomplished in 8-12 treatments.  Pt will self-report compliance with HEP to

## 2024-04-08 ENCOUNTER — HOSPITAL ENCOUNTER (OUTPATIENT)
Facility: HOSPITAL | Age: 74
Discharge: HOME | End: 2024-04-11
Payer: MEDICARE

## 2024-04-08 VITALS — WEIGHT: 182 LBS | HEIGHT: 62 IN | BODY MASS INDEX: 33.49 KG/M2

## 2024-04-08 DIAGNOSIS — Z12.31 SCREENING MAMMOGRAM FOR HIGH-RISK PATIENT: ICD-10-CM

## 2024-04-08 PROCEDURE — 77067 SCR MAMMO BI INCL CAD: CPT

## 2024-04-27 DIAGNOSIS — N18.32 TYPE 2 DIABETES MELLITUS WITH STAGE 3B CHRONIC KIDNEY DISEASE, WITHOUT LONG-TERM CURRENT USE OF INSULIN (HCC): ICD-10-CM

## 2024-04-27 DIAGNOSIS — E11.22 TYPE 2 DIABETES MELLITUS WITH STAGE 3B CHRONIC KIDNEY DISEASE, WITHOUT LONG-TERM CURRENT USE OF INSULIN (HCC): ICD-10-CM

## 2024-04-29 RX ORDER — CALCIUM CITRATE/VITAMIN D3 200MG-6.25
TABLET ORAL
Qty: 100 STRIP | OUTPATIENT
Start: 2024-04-29

## 2024-05-07 DIAGNOSIS — E11.42 DIABETIC POLYNEUROPATHY ASSOCIATED WITH TYPE 2 DIABETES MELLITUS (HCC): ICD-10-CM

## 2024-05-07 RX ORDER — SITAGLIPTIN 100 MG/1
100 TABLET, FILM COATED ORAL DAILY
Qty: 90 TABLET | Refills: 1 | Status: SHIPPED | OUTPATIENT
Start: 2024-05-07

## 2024-05-07 NOTE — TELEPHONE ENCOUNTER
PCP: José Miguel Ch APRN - NP    Last appt: 1/25/2024    Future Appointments   Date Time Provider Department Center   5/29/2024  8:30 AM José Miguel Ch APRN - NP [facility]       Requested Prescriptions     Pending Prescriptions Disp Refills    JANUVIA 100 MG tablet [Pharmacy Med Name: JANUVIA 100MG TABLETS] 90 tablet 1     Sig: TAKE 1 TABLET BY MOUTH DAILY

## 2024-05-08 RX ORDER — FENOFIBRATE 160 MG/1
TABLET ORAL
Qty: 90 TABLET | Refills: 1 | Status: SHIPPED | OUTPATIENT
Start: 2024-05-08

## 2024-05-08 RX ORDER — GABAPENTIN 300 MG/1
CAPSULE ORAL
Qty: 270 CAPSULE | Refills: 0 | Status: SHIPPED | OUTPATIENT
Start: 2024-05-08 | End: 2024-08-06

## 2024-05-08 NOTE — TELEPHONE ENCOUNTER
PCP: José Miguel Ch APRN - NP    Last appt: 1/25/2024    Future Appointments   Date Time Provider Department Center   5/10/2024  8:30 AM José Miguel Ch APRN - NP [facility]   5/29/2024  8:30 AM José Miguel Ch APRN - NP [facility]       Requested Prescriptions     Pending Prescriptions Disp Refills    fenofibrate (TRIGLIDE) 160 MG tablet [Pharmacy Med Name: FENOFIBRATE 160MG TABLETS] 90 tablet 1     Sig: TAKE 1 TABLET BY MOUTH DAILY FOR HIGH CHOLESTEROL    gabapentin (NEURONTIN) 300 MG capsule [Pharmacy Med Name: GABAPENTIN 300MG CAPSULES] 270 capsule 0     Sig: TAKE ONE CAPSULE BY MOUTH EACH MORNING TAKE 2 CAPSULES AT BEDTIMES

## 2024-05-10 ENCOUNTER — OFFICE VISIT (OUTPATIENT)
Facility: CLINIC | Age: 74
End: 2024-05-10
Payer: MEDICARE

## 2024-05-10 VITALS
TEMPERATURE: 98.3 F | RESPIRATION RATE: 16 BRPM | WEIGHT: 183.6 LBS | HEIGHT: 62 IN | BODY MASS INDEX: 33.79 KG/M2 | OXYGEN SATURATION: 99 % | SYSTOLIC BLOOD PRESSURE: 126 MMHG | HEART RATE: 65 BPM | DIASTOLIC BLOOD PRESSURE: 72 MMHG

## 2024-05-10 DIAGNOSIS — M70.61 TROCHANTERIC BURSITIS OF RIGHT HIP: ICD-10-CM

## 2024-05-10 DIAGNOSIS — M70.71: ICD-10-CM

## 2024-05-10 DIAGNOSIS — M54.31 SCIATICA OF RIGHT SIDE: Primary | ICD-10-CM

## 2024-05-10 PROCEDURE — 3078F DIAST BP <80 MM HG: CPT | Performed by: NURSE PRACTITIONER

## 2024-05-10 PROCEDURE — 3074F SYST BP LT 130 MM HG: CPT | Performed by: NURSE PRACTITIONER

## 2024-05-10 PROCEDURE — G8399 PT W/DXA RESULTS DOCUMENT: HCPCS | Performed by: NURSE PRACTITIONER

## 2024-05-10 PROCEDURE — 3017F COLORECTAL CA SCREEN DOC REV: CPT | Performed by: NURSE PRACTITIONER

## 2024-05-10 PROCEDURE — 1036F TOBACCO NON-USER: CPT | Performed by: NURSE PRACTITIONER

## 2024-05-10 PROCEDURE — 1090F PRES/ABSN URINE INCON ASSESS: CPT | Performed by: NURSE PRACTITIONER

## 2024-05-10 PROCEDURE — G8427 DOCREV CUR MEDS BY ELIG CLIN: HCPCS | Performed by: NURSE PRACTITIONER

## 2024-05-10 PROCEDURE — 99213 OFFICE O/P EST LOW 20 MIN: CPT | Performed by: NURSE PRACTITIONER

## 2024-05-10 PROCEDURE — 1123F ACP DISCUSS/DSCN MKR DOCD: CPT | Performed by: NURSE PRACTITIONER

## 2024-05-10 PROCEDURE — G8417 CALC BMI ABV UP PARAM F/U: HCPCS | Performed by: NURSE PRACTITIONER

## 2024-05-10 RX ORDER — PREDNISONE 5 MG/1
TABLET ORAL
Qty: 1 EACH | Refills: 0 | Status: SHIPPED | OUTPATIENT
Start: 2024-05-10

## 2024-05-10 RX ORDER — TRAMADOL HYDROCHLORIDE 50 MG/1
50 TABLET ORAL EVERY 8 HOURS PRN
Qty: 21 TABLET | Refills: 0 | Status: SHIPPED | OUTPATIENT
Start: 2024-05-10 | End: 2024-05-17

## 2024-05-10 RX ORDER — TIZANIDINE 2 MG/1
2 TABLET ORAL 3 TIMES DAILY PRN
Qty: 30 TABLET | Refills: 1 | Status: SHIPPED | OUTPATIENT
Start: 2024-05-10

## 2024-05-10 SDOH — ECONOMIC STABILITY: FOOD INSECURITY: WITHIN THE PAST 12 MONTHS, YOU WORRIED THAT YOUR FOOD WOULD RUN OUT BEFORE YOU GOT MONEY TO BUY MORE.: NEVER TRUE

## 2024-05-10 SDOH — ECONOMIC STABILITY: FOOD INSECURITY: WITHIN THE PAST 12 MONTHS, THE FOOD YOU BOUGHT JUST DIDN'T LAST AND YOU DIDN'T HAVE MONEY TO GET MORE.: NEVER TRUE

## 2024-05-10 SDOH — ECONOMIC STABILITY: INCOME INSECURITY: HOW HARD IS IT FOR YOU TO PAY FOR THE VERY BASICS LIKE FOOD, HOUSING, MEDICAL CARE, AND HEATING?: NOT HARD AT ALL

## 2024-05-10 NOTE — PROGRESS NOTES
Deysi Honeycutt is a 73 y.o. female     Chief Complaint   Patient presents with    Leg Pain     Right leg pain        /72 (Site: Left Upper Arm, Position: Sitting, Cuff Size: Medium Adult)   Pulse 65   Temp 98.3 °F (36.8 °C) (Oral)   Resp 16   Ht 1.575 m (5' 2\")   Wt 83.3 kg (183 lb 9.6 oz)   SpO2 99%   BMI 33.58 kg/m²     Health Maintenance Due   Topic Date Due    DTaP/Tdap/Td vaccine (1 - Tdap) Never done    Annual Wellness Visit (Medicare)  05/08/2024         \"Have you been to the ER, urgent care clinic since your last visit?  Hospitalized since your last visit?\"    NO    “Have you seen or consulted any other health care providers outside of [facility] since your last visit?”    NO

## 2024-05-10 NOTE — PROGRESS NOTES
Chief Complaint   Patient presents with    Leg Pain     Right leg pain        SUBJECTIVE:    Deyis Honeycutt is a 73 y.o. female who is here today with complaints of pain and discomfort that emanates from right hip/buttocks area and radiates down side of leg towards foot.  She states this has been bothering her for the past month or so and has not improved significantly.  She rates her pain as a 6/10 today.  She states she has been using over-the-counter emu cream, Voltaren gel, and Tylenol arthritis which provides only minimal improvement.  She states her pain started after completing her physical therapy for her hip.  She denies any trauma, rash, redness, bruising, or comes up with saddle anesthesia.  She is ambulatory.    Current Outpatient Medications   Medication Sig Dispense Refill    predniSONE 5 MG (21) TBPK Per package instructions. 1 each 0    tiZANidine (ZANAFLEX) 2 MG tablet Take 1 tablet by mouth 3 times daily as needed (spasms) 30 tablet 1    traMADol (ULTRAM) 50 MG tablet Take 1 tablet by mouth every 8 hours as needed for Pain for up to 7 days. Intended supply: 7 days. Take lowest dose possible to manage pain Max Daily Amount: 150 mg 21 tablet 0    fenofibrate (TRIGLIDE) 160 MG tablet TAKE 1 TABLET BY MOUTH DAILY FOR HIGH CHOLESTEROL 90 tablet 1    gabapentin (NEURONTIN) 300 MG capsule TAKE ONE CAPSULE BY MOUTH EACH MORNING TAKE 2 CAPSULES AT BEDTIMES 270 capsule 0    JANUVIA 100 MG tablet TAKE 1 TABLET BY MOUTH DAILY 90 tablet 1    losartan (COZAAR) 100 MG tablet TAKE 1 TABLET DAILY 90 tablet 1    metFORMIN (GLUCOPHAGE-XR) 500 MG extended release tablet TAKE 1 TABLET TWICE A DAY WITH MEALS 180 tablet 1    amLODIPine (NORVASC) 2.5 MG tablet TAKE 1 TABLET DAILY FOR HIGH BLOOD PRESSURE 90 tablet 1    levothyroxine (SYNTHROID) 88 MCG tablet TAKE 1 TABLET DAILY BEFORE BREAKFAST 90 tablet 1    rosuvastatin (CRESTOR) 20 MG tablet TAKE 1 TABLET AT BEDTIME 90 tablet 3    cetirizine (ZYRTEC) 10 MG tablet

## 2024-05-26 SDOH — HEALTH STABILITY: PHYSICAL HEALTH: ON AVERAGE, HOW MANY DAYS PER WEEK DO YOU ENGAGE IN MODERATE TO STRENUOUS EXERCISE (LIKE A BRISK WALK)?: 0 DAYS

## 2024-05-26 SDOH — HEALTH STABILITY: PHYSICAL HEALTH: ON AVERAGE, HOW MANY MINUTES DO YOU ENGAGE IN EXERCISE AT THIS LEVEL?: 0 MIN

## 2024-05-26 ASSESSMENT — LIFESTYLE VARIABLES
HOW MANY STANDARD DRINKS CONTAINING ALCOHOL DO YOU HAVE ON A TYPICAL DAY: 0
HOW OFTEN DO YOU HAVE SIX OR MORE DRINKS ON ONE OCCASION: 1
HOW MANY STANDARD DRINKS CONTAINING ALCOHOL DO YOU HAVE ON A TYPICAL DAY: PATIENT DOES NOT DRINK
HOW OFTEN DO YOU HAVE A DRINK CONTAINING ALCOHOL: 1
HOW OFTEN DO YOU HAVE A DRINK CONTAINING ALCOHOL: NEVER

## 2024-05-26 ASSESSMENT — PATIENT HEALTH QUESTIONNAIRE - PHQ9
2. FEELING DOWN, DEPRESSED OR HOPELESS: NOT AT ALL
SUM OF ALL RESPONSES TO PHQ QUESTIONS 1-9: 0
1. LITTLE INTEREST OR PLEASURE IN DOING THINGS: NOT AT ALL
SUM OF ALL RESPONSES TO PHQ9 QUESTIONS 1 & 2: 0
SUM OF ALL RESPONSES TO PHQ QUESTIONS 1-9: 0

## 2024-05-29 ENCOUNTER — OFFICE VISIT (OUTPATIENT)
Facility: CLINIC | Age: 74
End: 2024-05-29
Payer: MEDICARE

## 2024-05-29 VITALS
WEIGHT: 182.4 LBS | BODY MASS INDEX: 33.57 KG/M2 | DIASTOLIC BLOOD PRESSURE: 74 MMHG | OXYGEN SATURATION: 96 % | HEART RATE: 69 BPM | TEMPERATURE: 98.1 F | RESPIRATION RATE: 16 BRPM | HEIGHT: 62 IN | SYSTOLIC BLOOD PRESSURE: 132 MMHG

## 2024-05-29 DIAGNOSIS — E03.9 ACQUIRED HYPOTHYROIDISM: ICD-10-CM

## 2024-05-29 DIAGNOSIS — E11.69 MIXED HYPERLIPIDEMIA DUE TO TYPE 2 DIABETES MELLITUS (HCC): ICD-10-CM

## 2024-05-29 DIAGNOSIS — N18.32 TYPE 2 DIABETES MELLITUS WITH STAGE 3B CHRONIC KIDNEY DISEASE, WITHOUT LONG-TERM CURRENT USE OF INSULIN (HCC): ICD-10-CM

## 2024-05-29 DIAGNOSIS — E11.22 TYPE 2 DIABETES MELLITUS WITH STAGE 3B CHRONIC KIDNEY DISEASE, WITHOUT LONG-TERM CURRENT USE OF INSULIN (HCC): ICD-10-CM

## 2024-05-29 DIAGNOSIS — Z00.00 MEDICARE ANNUAL WELLNESS VISIT, SUBSEQUENT: Primary | ICD-10-CM

## 2024-05-29 DIAGNOSIS — E66.09 CLASS 1 OBESITY DUE TO EXCESS CALORIES WITH SERIOUS COMORBIDITY AND BODY MASS INDEX (BMI) OF 33.0 TO 33.9 IN ADULT: ICD-10-CM

## 2024-05-29 DIAGNOSIS — E78.2 MIXED HYPERLIPIDEMIA DUE TO TYPE 2 DIABETES MELLITUS (HCC): ICD-10-CM

## 2024-05-29 DIAGNOSIS — I10 ESSENTIAL HYPERTENSION: ICD-10-CM

## 2024-05-29 DIAGNOSIS — I25.10 ASHD (ARTERIOSCLEROTIC HEART DISEASE): ICD-10-CM

## 2024-05-29 DIAGNOSIS — Z71.89 ACP (ADVANCE CARE PLANNING): ICD-10-CM

## 2024-05-29 PROCEDURE — 3078F DIAST BP <80 MM HG: CPT | Performed by: NURSE PRACTITIONER

## 2024-05-29 PROCEDURE — G0439 PPPS, SUBSEQ VISIT: HCPCS | Performed by: NURSE PRACTITIONER

## 2024-05-29 PROCEDURE — 3044F HG A1C LEVEL LT 7.0%: CPT | Performed by: NURSE PRACTITIONER

## 2024-05-29 PROCEDURE — 3017F COLORECTAL CA SCREEN DOC REV: CPT | Performed by: NURSE PRACTITIONER

## 2024-05-29 PROCEDURE — 1123F ACP DISCUSS/DSCN MKR DOCD: CPT | Performed by: NURSE PRACTITIONER

## 2024-05-29 PROCEDURE — 3075F SYST BP GE 130 - 139MM HG: CPT | Performed by: NURSE PRACTITIONER

## 2024-05-29 NOTE — PATIENT INSTRUCTIONS
sure to contact your doctor if you have any problems.  Where can you learn more?  Go to [email or web address].net/patientEd and enter F075 to learn more about \"A Healthy Heart: Care Instructions.\"  Current as of: June 24, 2023               Content Version: 14.0  © 6651-6660 [employer].   Care instructions adapted under license by [employer]. If you have questions about a medical condition or this instruction, always ask your healthcare professional. [employer] disclaims any warranty or liability for your use of this information.      Personalized Preventive Plan for Dyesi Honeycutt - 5/29/2024  Medicare offers a range of preventive health benefits. Some of the tests and screenings are paid in full while other may be subject to a deductible, co-insurance, and/or copay.    Some of these benefits include a comprehensive review of your medical history including lifestyle, illnesses that may run in your family, and various assessments and screenings as appropriate.    After reviewing your medical record and screening and assessments performed today your provider may have ordered immunizations, labs, imaging, and/or referrals for you.  A list of these orders (if applicable) as well as your Preventive Care list are included within your After Visit Summary for your review.    Other Preventive Recommendations:    A preventive eye exam performed by an eye specialist is recommended every 1-2 years to screen for glaucoma; cataracts, macular degeneration, and other eye disorders.  A preventive dental visit is recommended every 6 months.  Try to get at least 150 minutes of exercise per week or 10,000 steps per day on a pedometer .  Order or download the FREE \"Exercise & Physical Activity: Your Everyday Guide\" from [employer]. Call 1-640.461.6655 or search [employer] online.  You need 1397-0280 mg of calcium and 7232-7342 IU of vitamin D per day. It is

## 2024-05-29 NOTE — PROGRESS NOTES
Medicare Annual Wellness Visit    Deysi Honeycutt is here for Medicare AWV    Assessment & Plan   Medicare annual wellness visit, subsequent  Type 2 diabetes mellitus with stage 3b chronic kidney disease, without long-term current use of insulin (HCC)  -     Comprehensive Metabolic Panel; Future  -     Hemoglobin A1C; Future  Mixed hyperlipidemia due to type 2 diabetes mellitus (HCC)  -     Hemoglobin A1C; Future  -     Lipid Panel; Future  Essential hypertension  ASHD (arteriosclerotic heart disease)  Acquired hypothyroidism  -     TSH + Free T4 Panel; Future  Class 1 obesity due to excess calories with serious comorbidity and body mass index (BMI) of 33.0 to 33.9 in adult  ACP (advance care planning)    Recommendations for Preventive Services Due: see orders and patient instructions/AVS.  Recommended screening schedule for the next 5-10 years is provided to the patient in written form: see Patient Instructions/AVS.     No follow-ups on file.     Subjective       Patient's complete Health Risk Assessment and screening values have been reviewed and are found in Flowsheets. The following problems were reviewed today and where indicated follow up appointments were made and/or referrals ordered.    Positive Risk Factor Screenings with Interventions:                Activity, Diet, and Weight:  On average, how many days per week do you engage in moderate to strenuous exercise (like a brisk walk)?: 0 days  On average, how many minutes do you engage in exercise at this level?: 0 min    Do you eat balanced/healthy meals regularly?: Yes    Body mass index is 33.36 kg/m². (!) Abnormal      Inactivity Interventions:  Patient declined any further interventions or treatment  Obesity Interventions:  Patient declines any further evaluation or treatment                               Objective   Vitals:    05/29/24 0831   BP: 132/74   Site: Left Upper Arm   Position: Sitting   Cuff Size: Medium Adult   Pulse: 69   Resp: 16   Temp:

## 2024-05-29 NOTE — PROGRESS NOTES
Deysi Honeycutt is a 73 y.o. female     Chief Complaint   Patient presents with    Medicare AWV       /74 (Site: Left Upper Arm, Position: Sitting, Cuff Size: Medium Adult)   Pulse 69   Temp 98.1 °F (36.7 °C) (Oral)   Resp 16   Ht 1.575 m (5' 2\")   Wt 82.7 kg (182 lb 6.4 oz)   SpO2 96%   BMI 33.36 kg/m²     Health Maintenance Due   Topic Date Due    DTaP/Tdap/Td vaccine (1 - Tdap) Never done    Annual Wellness Visit (Medicare)  05/08/2024         \"Have you been to the ER, urgent care clinic since your last visit?  Hospitalized since your last visit?\"    NO    “Have you seen or consulted any other health care providers outside of [facility] since your last visit?”    NO

## 2024-05-30 LAB
ALBUMIN SERPL-MCNC: 4.1 G/DL (ref 3.5–5)
ALBUMIN/GLOB SERPL: 1.3 (ref 1.1–2.2)
ALP SERPL-CCNC: 64 U/L (ref 45–117)
ALT SERPL-CCNC: 19 U/L (ref 12–78)
ANION GAP SERPL CALC-SCNC: 5 MMOL/L (ref 5–15)
AST SERPL-CCNC: 19 U/L (ref 15–37)
BILIRUB SERPL-MCNC: 0.4 MG/DL (ref 0.2–1)
BUN SERPL-MCNC: 24 MG/DL (ref 6–20)
BUN/CREAT SERPL: 17 (ref 12–20)
CALCIUM SERPL-MCNC: 9.9 MG/DL (ref 8.5–10.1)
CHLORIDE SERPL-SCNC: 107 MMOL/L (ref 97–108)
CHOLEST SERPL-MCNC: 163 MG/DL
CO2 SERPL-SCNC: 28 MMOL/L (ref 21–32)
CREAT SERPL-MCNC: 1.45 MG/DL (ref 0.55–1.02)
EST. AVERAGE GLUCOSE BLD GHB EST-MCNC: 163 MG/DL
GLOBULIN SER CALC-MCNC: 3.2 G/DL (ref 2–4)
GLUCOSE SERPL-MCNC: 179 MG/DL (ref 65–100)
HBA1C MFR BLD: 7.3 % (ref 4–5.6)
HDLC SERPL-MCNC: 40 MG/DL
HDLC SERPL: 4.1 (ref 0–5)
LDLC SERPL CALC-MCNC: 74.4 MG/DL (ref 0–100)
POTASSIUM SERPL-SCNC: 3.9 MMOL/L (ref 3.5–5.1)
PROT SERPL-MCNC: 7.3 G/DL (ref 6.4–8.2)
SODIUM SERPL-SCNC: 140 MMOL/L (ref 136–145)
T4 FREE SERPL-MCNC: 1.4 NG/DL (ref 0.8–1.5)
TRIGL SERPL-MCNC: 243 MG/DL
TSH SERPL DL<=0.05 MIU/L-ACNC: 0.23 UIU/ML (ref 0.36–3.74)
VLDLC SERPL CALC-MCNC: 48.6 MG/DL

## 2024-07-08 DIAGNOSIS — E11.69 MIXED HYPERLIPIDEMIA DUE TO TYPE 2 DIABETES MELLITUS (HCC): ICD-10-CM

## 2024-07-08 DIAGNOSIS — E78.2 MIXED HYPERLIPIDEMIA DUE TO TYPE 2 DIABETES MELLITUS (HCC): ICD-10-CM

## 2024-07-08 DIAGNOSIS — I10 ESSENTIAL HYPERTENSION: Primary | ICD-10-CM

## 2024-07-08 DIAGNOSIS — E03.9 ACQUIRED HYPOTHYROIDISM: ICD-10-CM

## 2024-07-08 RX ORDER — AMLODIPINE BESYLATE 2.5 MG/1
TABLET ORAL
Qty: 90 TABLET | Refills: 1 | Status: SHIPPED | OUTPATIENT
Start: 2024-07-08

## 2024-07-08 RX ORDER — LOSARTAN POTASSIUM 100 MG/1
TABLET ORAL
Qty: 90 TABLET | Refills: 1 | Status: SHIPPED | OUTPATIENT
Start: 2024-07-08

## 2024-07-08 RX ORDER — LEVOTHYROXINE SODIUM 88 UG/1
TABLET ORAL
Qty: 90 TABLET | Refills: 1 | Status: SHIPPED | OUTPATIENT
Start: 2024-07-08

## 2024-07-08 RX ORDER — METFORMIN HYDROCHLORIDE 500 MG/1
TABLET, EXTENDED RELEASE ORAL
Qty: 180 TABLET | Refills: 1 | Status: SHIPPED | OUTPATIENT
Start: 2024-07-08

## 2024-07-08 NOTE — TELEPHONE ENCOUNTER
PCP: José Miguel Ch APRN - NP    Last appt: 5/29/2024    Future Appointments   Date Time Provider Department Center   12/4/2024  8:30 AM José Miguel Ch APRN - NP [facility]       Requested Prescriptions     Pending Prescriptions Disp Refills    losartan (COZAAR) 100 MG tablet [Pharmacy Med Name: LOSARTAN TABS 100MG] 90 tablet 3     Sig: TAKE 1 TABLET DAILY    amLODIPine (NORVASC) 2.5 MG tablet [Pharmacy Med Name: AMLODIPINE BESYLATE TABS 2.5MG] 90 tablet 3     Sig: TAKE 1 TABLET DAILY FOR HIGH BLOOD PRESSURE    levothyroxine (SYNTHROID) 88 MCG tablet [Pharmacy Med Name: L-THYROXINE (SYNTHROID) TABS 88MCG] 90 tablet 3     Sig: TAKE 1 TABLET DAILY BEFORE BREAKFAST    metFORMIN (GLUCOPHAGE-XR) 500 MG extended release tablet [Pharmacy Med Name: METFORMIN HCL ER TABS 500MG] 180 tablet 3     Sig: TAKE 1 TABLET TWICE A DAY WITH MEALS

## 2024-09-18 DIAGNOSIS — E11.42 DIABETIC POLYNEUROPATHY ASSOCIATED WITH TYPE 2 DIABETES MELLITUS (HCC): ICD-10-CM

## 2024-09-19 RX ORDER — GABAPENTIN 300 MG/1
CAPSULE ORAL
Qty: 270 CAPSULE | Refills: 1 | Status: SHIPPED | OUTPATIENT
Start: 2024-09-19 | End: 2025-03-18

## 2024-11-01 RX ORDER — SITAGLIPTIN 100 MG/1
100 TABLET, FILM COATED ORAL DAILY
Qty: 90 TABLET | Refills: 1 | Status: SHIPPED | OUTPATIENT
Start: 2024-11-01

## 2024-11-01 RX ORDER — FENOFIBRATE 160 MG/1
TABLET ORAL
Qty: 90 TABLET | Refills: 1 | Status: SHIPPED | OUTPATIENT
Start: 2024-11-01

## 2024-11-01 NOTE — TELEPHONE ENCOUNTER
PCP: José Miguel Ch APRN - NP    Last appt: 5/29/2024    Future Appointments   Date Time Provider Department Center   12/4/2024  8:30 AM José Miguel Ch APRN - NP [facility]       Requested Prescriptions     Pending Prescriptions Disp Refills    fenofibrate 160 MG tablet [Pharmacy Med Name: FENOFIBRATE 160MG TABLETS] 90 tablet 1     Sig: TAKE 1 TABLET BY MOUTH DAILY FOR HIGH CHOLESTEROL    JANUVIA 100 MG tablet [Pharmacy Med Name: JANUVIA 100MG TABLETS] 90 tablet 1     Sig: TAKE 1 TABLET BY MOUTH DAILY

## 2024-12-04 ENCOUNTER — OFFICE VISIT (OUTPATIENT)
Facility: CLINIC | Age: 74
End: 2024-12-04
Payer: MEDICARE

## 2024-12-04 VITALS
HEIGHT: 62 IN | WEIGHT: 180.8 LBS | TEMPERATURE: 97.6 F | SYSTOLIC BLOOD PRESSURE: 126 MMHG | DIASTOLIC BLOOD PRESSURE: 72 MMHG | RESPIRATION RATE: 16 BRPM | OXYGEN SATURATION: 100 % | BODY MASS INDEX: 33.27 KG/M2 | HEART RATE: 58 BPM

## 2024-12-04 DIAGNOSIS — E78.2 MIXED HYPERLIPIDEMIA DUE TO TYPE 2 DIABETES MELLITUS (HCC): Primary | ICD-10-CM

## 2024-12-04 DIAGNOSIS — I10 ESSENTIAL HYPERTENSION: ICD-10-CM

## 2024-12-04 DIAGNOSIS — E03.9 ACQUIRED HYPOTHYROIDISM: ICD-10-CM

## 2024-12-04 DIAGNOSIS — E11.69 MIXED HYPERLIPIDEMIA DUE TO TYPE 2 DIABETES MELLITUS (HCC): Primary | ICD-10-CM

## 2024-12-04 DIAGNOSIS — E66.09 CLASS 1 OBESITY DUE TO EXCESS CALORIES WITH SERIOUS COMORBIDITY AND BODY MASS INDEX (BMI) OF 33.0 TO 33.9 IN ADULT: ICD-10-CM

## 2024-12-04 DIAGNOSIS — E66.811 CLASS 1 OBESITY DUE TO EXCESS CALORIES WITH SERIOUS COMORBIDITY AND BODY MASS INDEX (BMI) OF 33.0 TO 33.9 IN ADULT: ICD-10-CM

## 2024-12-04 DIAGNOSIS — I25.10 ASHD (ARTERIOSCLEROTIC HEART DISEASE): ICD-10-CM

## 2024-12-04 PROCEDURE — G8417 CALC BMI ABV UP PARAM F/U: HCPCS | Performed by: NURSE PRACTITIONER

## 2024-12-04 PROCEDURE — G8484 FLU IMMUNIZE NO ADMIN: HCPCS | Performed by: NURSE PRACTITIONER

## 2024-12-04 PROCEDURE — 2022F DILAT RTA XM EVC RTNOPTHY: CPT | Performed by: NURSE PRACTITIONER

## 2024-12-04 PROCEDURE — 1126F AMNT PAIN NOTED NONE PRSNT: CPT | Performed by: NURSE PRACTITIONER

## 2024-12-04 PROCEDURE — 1159F MED LIST DOCD IN RCRD: CPT | Performed by: NURSE PRACTITIONER

## 2024-12-04 PROCEDURE — 99214 OFFICE O/P EST MOD 30 MIN: CPT | Performed by: NURSE PRACTITIONER

## 2024-12-04 PROCEDURE — 1036F TOBACCO NON-USER: CPT | Performed by: NURSE PRACTITIONER

## 2024-12-04 PROCEDURE — 1123F ACP DISCUSS/DSCN MKR DOCD: CPT | Performed by: NURSE PRACTITIONER

## 2024-12-04 PROCEDURE — 3017F COLORECTAL CA SCREEN DOC REV: CPT | Performed by: NURSE PRACTITIONER

## 2024-12-04 PROCEDURE — 3074F SYST BP LT 130 MM HG: CPT | Performed by: NURSE PRACTITIONER

## 2024-12-04 PROCEDURE — 3078F DIAST BP <80 MM HG: CPT | Performed by: NURSE PRACTITIONER

## 2024-12-04 PROCEDURE — G8427 DOCREV CUR MEDS BY ELIG CLIN: HCPCS | Performed by: NURSE PRACTITIONER

## 2024-12-04 PROCEDURE — 1160F RVW MEDS BY RX/DR IN RCRD: CPT | Performed by: NURSE PRACTITIONER

## 2024-12-04 PROCEDURE — 3051F HG A1C>EQUAL 7.0%<8.0%: CPT | Performed by: NURSE PRACTITIONER

## 2024-12-04 PROCEDURE — G8399 PT W/DXA RESULTS DOCUMENT: HCPCS | Performed by: NURSE PRACTITIONER

## 2024-12-04 PROCEDURE — 1090F PRES/ABSN URINE INCON ASSESS: CPT | Performed by: NURSE PRACTITIONER

## 2024-12-04 NOTE — PROGRESS NOTES
Deysi Honeycutt is a 74 y.o. female     Chief Complaint   Patient presents with    Diabetes     6M       /72 (Site: Left Upper Arm, Position: Sitting, Cuff Size: Medium Adult)   Pulse 58   Temp 97.6 °F (36.4 °C) (Oral)   Resp 16   Ht 1.575 m (5' 2\")   Wt 82 kg (180 lb 12.8 oz)   SpO2 100%   BMI 33.07 kg/m²     Health Maintenance Due   Topic Date Due    DTaP/Tdap/Td vaccine (1 - Tdap) Never done         \"Have you been to the ER, urgent care clinic since your last visit?  Hospitalized since your last visit?\"    NO    “Have you seen or consulted any other health care providers outside of [facility] since your last visit?”    NO

## 2024-12-04 NOTE — PROGRESS NOTES
Chief Complaint   Patient presents with    Diabetes     6M       SUBJECTIVE:    Deysi Honeycutt is a 74 y.o. female who is here today for a follow up appointment regarding current medical conditions including: Type 2 diabetes with mixed hyperlipidemia, essential hypertension, ASHD, acquired hypothyroidism, and obesity.  She is not fasting today.    The patient remains on metformin and Januvia for management of her type 2 diabetes.  She is not on insulin.  She is not checking her blood sugars on a regular basis.  Her last hemoglobin A1c was slightly elevated from her prior study and found to be 7.3%.    She is currently on a combination of losartan and amlodipine daily for blood pressure management and tolerates this well.  Her blood pressures have remained stable and in good control overall.  She currently takes rosuvastatin and fenofibrate for management of her mixed hyperlipidemia and tolerates this well.  She denies any recent episodes of chest pain, chest pressure, shortness of breath, headaches, dizziness, blurred vision, palpitations, or syncope episodes.    She continues to take levothyroxine daily for management of her hypothyroidism and tolerates this well.          Current Outpatient Medications   Medication Sig Dispense Refill    fenofibrate 160 MG tablet TAKE 1 TABLET BY MOUTH DAILY FOR HIGH CHOLESTEROL 90 tablet 1    JANUVIA 100 MG tablet TAKE 1 TABLET BY MOUTH DAILY 90 tablet 1    gabapentin (NEURONTIN) 300 MG capsule TAKE 1 CAPSULE BY MOUTH EVERY MORNING, TAKE 2 CAPSULES BY MOUTH EVERY NIGHT AT BEDTIME 270 capsule 1    losartan (COZAAR) 100 MG tablet TAKE 1 TABLET DAILY 90 tablet 1    amLODIPine (NORVASC) 2.5 MG tablet TAKE 1 TABLET DAILY FOR HIGH BLOOD PRESSURE 90 tablet 1    levothyroxine (SYNTHROID) 88 MCG tablet TAKE 1 TABLET DAILY BEFORE BREAKFAST 90 tablet 1    metFORMIN (GLUCOPHAGE-XR) 500 MG extended release tablet TAKE 1 TABLET TWICE A DAY WITH MEALS 180 tablet 1    rosuvastatin

## 2024-12-10 ENCOUNTER — LAB (OUTPATIENT)
Facility: CLINIC | Age: 74
End: 2024-12-10

## 2024-12-10 DIAGNOSIS — I10 ESSENTIAL HYPERTENSION: ICD-10-CM

## 2024-12-10 DIAGNOSIS — E78.2 MIXED HYPERLIPIDEMIA DUE TO TYPE 2 DIABETES MELLITUS (HCC): ICD-10-CM

## 2024-12-10 DIAGNOSIS — E03.9 ACQUIRED HYPOTHYROIDISM: ICD-10-CM

## 2024-12-10 DIAGNOSIS — E11.69 MIXED HYPERLIPIDEMIA DUE TO TYPE 2 DIABETES MELLITUS (HCC): ICD-10-CM

## 2024-12-11 LAB
ALBUMIN SERPL-MCNC: 4 G/DL (ref 3.5–5)
ALBUMIN/GLOB SERPL: 1.4 (ref 1.1–2.2)
ALP SERPL-CCNC: 71 U/L (ref 45–117)
ALT SERPL-CCNC: 19 U/L (ref 12–78)
ANION GAP SERPL CALC-SCNC: 3 MMOL/L (ref 2–12)
AST SERPL-CCNC: 26 U/L (ref 15–37)
BILIRUB SERPL-MCNC: 0.3 MG/DL (ref 0.2–1)
BUN SERPL-MCNC: 28 MG/DL (ref 6–20)
BUN/CREAT SERPL: 21 (ref 12–20)
CALCIUM SERPL-MCNC: 9.9 MG/DL (ref 8.5–10.1)
CHLORIDE SERPL-SCNC: 109 MMOL/L (ref 97–108)
CHOLEST SERPL-MCNC: 144 MG/DL
CO2 SERPL-SCNC: 28 MMOL/L (ref 21–32)
CREAT SERPL-MCNC: 1.34 MG/DL (ref 0.55–1.02)
ERYTHROCYTE [DISTWIDTH] IN BLOOD BY AUTOMATED COUNT: 13.2 % (ref 11.5–14.5)
EST. AVERAGE GLUCOSE BLD GHB EST-MCNC: 169 MG/DL
GLOBULIN SER CALC-MCNC: 2.8 G/DL (ref 2–4)
GLUCOSE SERPL-MCNC: 161 MG/DL (ref 65–100)
HBA1C MFR BLD: 7.5 % (ref 4–5.6)
HCT VFR BLD AUTO: 32.9 % (ref 35–47)
HDLC SERPL-MCNC: 38 MG/DL
HDLC SERPL: 3.8 (ref 0–5)
HGB BLD-MCNC: 10.7 G/DL (ref 11.5–16)
LDLC SERPL CALC-MCNC: 57.8 MG/DL (ref 0–100)
MCH RBC QN AUTO: 30 PG (ref 26–34)
MCHC RBC AUTO-ENTMCNC: 32.5 G/DL (ref 30–36.5)
MCV RBC AUTO: 92.2 FL (ref 80–99)
NRBC # BLD: 0 K/UL (ref 0–0.01)
NRBC BLD-RTO: 0 PER 100 WBC
PLATELET # BLD AUTO: 222 K/UL (ref 150–400)
PMV BLD AUTO: 11.3 FL (ref 8.9–12.9)
POTASSIUM SERPL-SCNC: 4.5 MMOL/L (ref 3.5–5.1)
PROT SERPL-MCNC: 6.8 G/DL (ref 6.4–8.2)
RBC # BLD AUTO: 3.57 M/UL (ref 3.8–5.2)
SODIUM SERPL-SCNC: 140 MMOL/L (ref 136–145)
T4 FREE SERPL-MCNC: 1.6 NG/DL (ref 0.8–1.5)
TRIGL SERPL-MCNC: 241 MG/DL
TSH SERPL DL<=0.05 MIU/L-ACNC: 0.29 UIU/ML (ref 0.36–3.74)
VLDLC SERPL CALC-MCNC: 48.2 MG/DL
WBC # BLD AUTO: 6.5 K/UL (ref 3.6–11)

## 2024-12-16 RX ORDER — ROSUVASTATIN CALCIUM 20 MG/1
TABLET, COATED ORAL
Qty: 90 TABLET | Refills: 0 | Status: SHIPPED | OUTPATIENT
Start: 2024-12-16

## 2024-12-16 NOTE — TELEPHONE ENCOUNTER
RX refill request from the patient/pharmacy. Patient last seen 12- with labs, and next appt. scheduled for 06-  Requested Prescriptions     Pending Prescriptions Disp Refills    rosuvastatin (CRESTOR) 20 MG tablet [Pharmacy Med Name: ROSUVASTATIN TABS 20MG] 90 tablet 0     Sig: TAKE 1 TABLET AT BEDTIME    .

## 2024-12-27 ENCOUNTER — TELEPHONE (OUTPATIENT)
Facility: CLINIC | Age: 74
End: 2024-12-27

## 2024-12-31 NOTE — TELEPHONE ENCOUNTER
Called and s/w pt regarding note. She stated she tried to see José Miguel yesterday but he is out of office so she went to urgent care same day and was diagnosed with the flu. She stated she isn't all the way better but is getting there. She also verbalized understanding regarding provider message.

## 2025-01-02 DIAGNOSIS — E78.2 MIXED HYPERLIPIDEMIA DUE TO TYPE 2 DIABETES MELLITUS (HCC): ICD-10-CM

## 2025-01-02 DIAGNOSIS — E11.69 MIXED HYPERLIPIDEMIA DUE TO TYPE 2 DIABETES MELLITUS (HCC): ICD-10-CM

## 2025-01-02 DIAGNOSIS — E03.9 ACQUIRED HYPOTHYROIDISM: ICD-10-CM

## 2025-01-02 DIAGNOSIS — I10 ESSENTIAL HYPERTENSION: ICD-10-CM

## 2025-01-03 RX ORDER — LOSARTAN POTASSIUM 100 MG/1
TABLET ORAL
Qty: 90 TABLET | Refills: 1 | Status: SHIPPED | OUTPATIENT
Start: 2025-01-03

## 2025-01-03 RX ORDER — AMLODIPINE BESYLATE 2.5 MG/1
TABLET ORAL
Qty: 90 TABLET | Refills: 1 | Status: SHIPPED | OUTPATIENT
Start: 2025-01-03

## 2025-01-03 RX ORDER — LEVOTHYROXINE SODIUM 88 UG/1
TABLET ORAL
Qty: 90 TABLET | Refills: 1 | Status: SHIPPED | OUTPATIENT
Start: 2025-01-03

## 2025-01-03 RX ORDER — METFORMIN HYDROCHLORIDE 500 MG/1
TABLET, EXTENDED RELEASE ORAL
Qty: 180 TABLET | Refills: 1 | Status: SHIPPED | OUTPATIENT
Start: 2025-01-03

## 2025-01-03 NOTE — TELEPHONE ENCOUNTER
PCP: José Miguel Ch APRN - NP    Last appt: 12/4/2024    Future Appointments   Date Time Provider Department Center   6/10/2025  8:00 AM José Miguel Ch APRN - NP [facility] DEP       Requested Prescriptions     Pending Prescriptions Disp Refills    metFORMIN (GLUCOPHAGE-XR) 500 MG extended release tablet [Pharmacy Med Name: METFORMIN HCL ER TABS 500MG] 180 tablet 1     Sig: TAKE 1 TABLET TWICE A DAY WITH MEALS    amLODIPine (NORVASC) 2.5 MG tablet [Pharmacy Med Name: AMLODIPINE BESYLATE TABS 2.5MG] 90 tablet 1     Sig: TAKE 1 TABLET DAILY FOR HIGH BLOOD PRESSURE    losartan (COZAAR) 100 MG tablet [Pharmacy Med Name: LOSARTAN TABS 100MG] 90 tablet 1     Sig: TAKE 1 TABLET DAILY    levothyroxine (SYNTHROID) 88 MCG tablet [Pharmacy Med Name: L-THYROXINE (SYNTHROID) TABS 88MCG] 90 tablet 1     Sig: TAKE 1 TABLET DAILY BEFORE BREAKFAST

## 2025-02-14 RX ORDER — FENOFIBRATE 160 MG/1
TABLET ORAL
Qty: 90 TABLET | Refills: 1 | Status: SHIPPED | OUTPATIENT
Start: 2025-02-14

## 2025-02-14 RX ORDER — SITAGLIPTIN 100 MG/1
100 TABLET, FILM COATED ORAL DAILY
Qty: 90 TABLET | Refills: 1 | Status: SHIPPED | OUTPATIENT
Start: 2025-02-14

## 2025-02-14 NOTE — TELEPHONE ENCOUNTER
PCP: José Miguel Ch APRN - NP    Last appt: 12/4/2024    Future Appointments   Date Time Provider Department Center   6/10/2025  8:00 AM José Miguel Ch APRN - NP [facility] DEP       Requested Prescriptions     Pending Prescriptions Disp Refills    fenofibrate 160 MG tablet [Pharmacy Med Name: FENOFIBRATE 160MG TABLETS] 90 tablet 1     Sig: TAKE 1 TABLET BY MOUTH DAILY FOR HIGH CHOLESTEROL    JANUVIA 100 MG tablet [Pharmacy Med Name: JANUVIA 100MG TABLETS] 90 tablet 1     Sig: TAKE 1 TABLET BY MOUTH DAILY

## 2025-03-17 RX ORDER — ROSUVASTATIN CALCIUM 20 MG/1
20 TABLET, COATED ORAL NIGHTLY
Qty: 90 TABLET | Refills: 1 | Status: SHIPPED | OUTPATIENT
Start: 2025-03-17

## 2025-03-17 NOTE — TELEPHONE ENCOUNTER
RX refill request from the patient/pharmacy. Patient last seen 12/04/2024 with labs, and next appt. scheduled for 06/10/2025.   Requested Prescriptions     Pending Prescriptions Disp Refills    rosuvastatin (CRESTOR) 20 MG tablet [Pharmacy Med Name: ROSUVASTATIN TABS 20MG] 90 tablet 1     Sig: TAKE 1 TABLET AT BEDTIME

## 2025-03-19 ENCOUNTER — TRANSCRIBE ORDERS (OUTPATIENT)
Facility: HOSPITAL | Age: 75
End: 2025-03-19

## 2025-03-19 DIAGNOSIS — Z12.31 VISIT FOR SCREENING MAMMOGRAM: Primary | ICD-10-CM

## 2025-04-16 ENCOUNTER — PATIENT MESSAGE (OUTPATIENT)
Facility: CLINIC | Age: 75
End: 2025-04-16

## 2025-04-17 SDOH — ECONOMIC STABILITY: FOOD INSECURITY: WITHIN THE PAST 12 MONTHS, THE FOOD YOU BOUGHT JUST DIDN'T LAST AND YOU DIDN'T HAVE MONEY TO GET MORE.: NEVER TRUE

## 2025-04-17 SDOH — ECONOMIC STABILITY: INCOME INSECURITY: IN THE LAST 12 MONTHS, WAS THERE A TIME WHEN YOU WERE NOT ABLE TO PAY THE MORTGAGE OR RENT ON TIME?: NO

## 2025-04-17 SDOH — ECONOMIC STABILITY: FOOD INSECURITY: WITHIN THE PAST 12 MONTHS, YOU WORRIED THAT YOUR FOOD WOULD RUN OUT BEFORE YOU GOT MONEY TO BUY MORE.: NEVER TRUE

## 2025-04-17 SDOH — ECONOMIC STABILITY: TRANSPORTATION INSECURITY
IN THE PAST 12 MONTHS, HAS THE LACK OF TRANSPORTATION KEPT YOU FROM MEDICAL APPOINTMENTS OR FROM GETTING MEDICATIONS?: NO

## 2025-04-18 ENCOUNTER — OFFICE VISIT (OUTPATIENT)
Facility: CLINIC | Age: 75
End: 2025-04-18
Payer: MEDICARE

## 2025-04-18 VITALS
WEIGHT: 181.2 LBS | HEIGHT: 62 IN | OXYGEN SATURATION: 98 % | HEART RATE: 74 BPM | BODY MASS INDEX: 33.34 KG/M2 | TEMPERATURE: 97.4 F | RESPIRATION RATE: 16 BRPM | SYSTOLIC BLOOD PRESSURE: 124 MMHG | DIASTOLIC BLOOD PRESSURE: 76 MMHG

## 2025-04-18 DIAGNOSIS — M65.30 TRIGGER FINGER OF LEFT HAND, UNSPECIFIED FINGER: Primary | ICD-10-CM

## 2025-04-18 DIAGNOSIS — E11.42 DIABETIC POLYNEUROPATHY ASSOCIATED WITH TYPE 2 DIABETES MELLITUS: ICD-10-CM

## 2025-04-18 PROCEDURE — 1036F TOBACCO NON-USER: CPT | Performed by: NURSE PRACTITIONER

## 2025-04-18 PROCEDURE — 3074F SYST BP LT 130 MM HG: CPT | Performed by: NURSE PRACTITIONER

## 2025-04-18 PROCEDURE — 1160F RVW MEDS BY RX/DR IN RCRD: CPT | Performed by: NURSE PRACTITIONER

## 2025-04-18 PROCEDURE — 1090F PRES/ABSN URINE INCON ASSESS: CPT | Performed by: NURSE PRACTITIONER

## 2025-04-18 PROCEDURE — 1126F AMNT PAIN NOTED NONE PRSNT: CPT | Performed by: NURSE PRACTITIONER

## 2025-04-18 PROCEDURE — 99213 OFFICE O/P EST LOW 20 MIN: CPT | Performed by: NURSE PRACTITIONER

## 2025-04-18 PROCEDURE — 3046F HEMOGLOBIN A1C LEVEL >9.0%: CPT | Performed by: NURSE PRACTITIONER

## 2025-04-18 PROCEDURE — 1123F ACP DISCUSS/DSCN MKR DOCD: CPT | Performed by: NURSE PRACTITIONER

## 2025-04-18 PROCEDURE — 1159F MED LIST DOCD IN RCRD: CPT | Performed by: NURSE PRACTITIONER

## 2025-04-18 PROCEDURE — 3078F DIAST BP <80 MM HG: CPT | Performed by: NURSE PRACTITIONER

## 2025-04-18 PROCEDURE — 2022F DILAT RTA XM EVC RTNOPTHY: CPT | Performed by: NURSE PRACTITIONER

## 2025-04-18 PROCEDURE — G8427 DOCREV CUR MEDS BY ELIG CLIN: HCPCS | Performed by: NURSE PRACTITIONER

## 2025-04-18 PROCEDURE — G8399 PT W/DXA RESULTS DOCUMENT: HCPCS | Performed by: NURSE PRACTITIONER

## 2025-04-18 PROCEDURE — 3017F COLORECTAL CA SCREEN DOC REV: CPT | Performed by: NURSE PRACTITIONER

## 2025-04-18 PROCEDURE — G8417 CALC BMI ABV UP PARAM F/U: HCPCS | Performed by: NURSE PRACTITIONER

## 2025-04-18 RX ORDER — GABAPENTIN 300 MG/1
300 CAPSULE ORAL 3 TIMES DAILY
Qty: 270 CAPSULE | Refills: 1 | Status: SHIPPED | OUTPATIENT
Start: 2025-04-18 | End: 2025-10-15

## 2025-04-18 ASSESSMENT — PATIENT HEALTH QUESTIONNAIRE - PHQ9
SUM OF ALL RESPONSES TO PHQ QUESTIONS 1-9: 0
SUM OF ALL RESPONSES TO PHQ QUESTIONS 1-9: 0
1. LITTLE INTEREST OR PLEASURE IN DOING THINGS: NOT AT ALL
SUM OF ALL RESPONSES TO PHQ QUESTIONS 1-9: 0
2. FEELING DOWN, DEPRESSED OR HOPELESS: NOT AT ALL
SUM OF ALL RESPONSES TO PHQ QUESTIONS 1-9: 0

## 2025-04-18 NOTE — PROGRESS NOTES
Deysi Honeycutt is a 74 y.o. female     Chief Complaint   Patient presents with    Finger issue     Ring and middle finger on left hand       /76   Pulse 74   Temp 97.4 °F (36.3 °C) (Oral)   Resp 16   Ht 1.575 m (5' 2\")   Wt 82.2 kg (181 lb 3.2 oz)   SpO2 98%   BMI 33.14 kg/m²     Health Maintenance Due   Topic Date Due    DTaP/Tdap/Td vaccine (1 - Tdap) Never done    Diabetic foot exam  01/25/2025    Diabetic Alb to Cr ratio (uACR) test  01/25/2025         \"Have you been to the ER, urgent care clinic since your last visit?  Hospitalized since your last visit?\"    NO    “Have you seen or consulted any other health care providers outside of [facility] since your last visit?”    NO

## 2025-04-18 NOTE — PROGRESS NOTES
Chief Complaint   Patient presents with    Finger issue     Ring and middle finger on left hand       SUBJECTIVE:    Deysi Honeycutt is a 74 y.o. female who is here today with complaints of locking of her left 3rd and 4th fingers.    Patient has been experiencing some intermittent locking to her left 3rd and 4th fingers for the past several months.  She states it has gradually grown worse and more uncomfortable.  She denies any trauma to the area and has experienced no rash, bruising, redness, or swelling.      Current Outpatient Medications   Medication Sig Dispense Refill    gabapentin (NEURONTIN) 300 MG capsule Take 1 capsule by mouth 3 times daily for 180 days. Max Daily Amount: 900 mg 270 capsule 1    rosuvastatin (CRESTOR) 20 MG tablet TAKE 1 TABLET AT BEDTIME 90 tablet 1    fenofibrate 160 MG tablet TAKE 1 TABLET BY MOUTH DAILY FOR HIGH CHOLESTEROL 90 tablet 1    JANUVIA 100 MG tablet TAKE 1 TABLET BY MOUTH DAILY 90 tablet 1    metFORMIN (GLUCOPHAGE-XR) 500 MG extended release tablet TAKE 1 TABLET TWICE A DAY WITH MEALS 180 tablet 1    amLODIPine (NORVASC) 2.5 MG tablet TAKE 1 TABLET DAILY FOR HIGH BLOOD PRESSURE 90 tablet 1    losartan (COZAAR) 100 MG tablet TAKE 1 TABLET DAILY 90 tablet 1    levothyroxine (SYNTHROID) 88 MCG tablet TAKE 1 TABLET DAILY BEFORE BREAKFAST 90 tablet 1    cetirizine (ZYRTEC) 10 MG tablet Take 1 tablet by mouth daily      Probiotic Product (PROBIOTIC PO) Take by mouth daily      Multiple Vitamins-Minerals (WOMENS MULTIVITAMIN PO) Take by mouth daily      clobetasol (TEMOVATE) 0.05 % cream APPLY TOPICALLY TO THE AFFECTED AREA TWICE DAILY 30 g 2    fluticasone (FLONASE) 50 MCG/ACT nasal spray nightly.      Urea (CARMOL) 40 % cream Apply topically as needed       No current facility-administered medications for this visit.     Past Medical History:   Diagnosis Date    Allergic rhinitis     Anemia     Basal cell carcinoma     tip of nose and cheek    Carcinoma     tip of nose &

## 2025-04-25 ENCOUNTER — PATIENT MESSAGE (OUTPATIENT)
Facility: CLINIC | Age: 75
End: 2025-04-25

## 2025-04-25 RX ORDER — CALCIUM CITRATE/VITAMIN D3 200MG-6.25
1 TABLET ORAL DAILY
Qty: 100 EACH | Refills: 3 | Status: SHIPPED | OUTPATIENT
Start: 2025-04-25 | End: 2025-04-25

## 2025-04-25 RX ORDER — CALCIUM CITRATE/VITAMIN D3 200MG-6.25
TABLET ORAL
Qty: 100 EACH | Refills: 3 | Status: SHIPPED | OUTPATIENT
Start: 2025-04-25

## 2025-04-29 ENCOUNTER — RESULTS FOLLOW-UP (OUTPATIENT)
Facility: CLINIC | Age: 75
End: 2025-04-29

## 2025-04-29 ENCOUNTER — HOSPITAL ENCOUNTER (OUTPATIENT)
Facility: HOSPITAL | Age: 75
Discharge: HOME | End: 2025-05-02
Payer: MEDICARE

## 2025-04-29 DIAGNOSIS — Z12.31 VISIT FOR SCREENING MAMMOGRAM: ICD-10-CM

## 2025-04-29 PROCEDURE — 77063 BREAST TOMOSYNTHESIS BI: CPT

## 2025-04-30 ENCOUNTER — PATIENT MESSAGE (OUTPATIENT)
Facility: CLINIC | Age: 75
End: 2025-04-30

## 2025-04-30 DIAGNOSIS — E11.42 DIABETIC POLYNEUROPATHY ASSOCIATED WITH TYPE 2 DIABETES MELLITUS (HCC): Primary | ICD-10-CM

## 2025-06-07 SDOH — HEALTH STABILITY: PHYSICAL HEALTH: ON AVERAGE, HOW MANY DAYS PER WEEK DO YOU ENGAGE IN MODERATE TO STRENUOUS EXERCISE (LIKE A BRISK WALK)?: 0 DAYS

## 2025-06-07 SDOH — HEALTH STABILITY: PHYSICAL HEALTH: ON AVERAGE, HOW MANY MINUTES DO YOU ENGAGE IN EXERCISE AT THIS LEVEL?: 0 MIN

## 2025-06-07 ASSESSMENT — LIFESTYLE VARIABLES
HOW MANY STANDARD DRINKS CONTAINING ALCOHOL DO YOU HAVE ON A TYPICAL DAY: PATIENT DOES NOT DRINK
HOW MANY STANDARD DRINKS CONTAINING ALCOHOL DO YOU HAVE ON A TYPICAL DAY: 0
HOW OFTEN DO YOU HAVE A DRINK CONTAINING ALCOHOL: NEVER
HOW OFTEN DO YOU HAVE A DRINK CONTAINING ALCOHOL: 1
HOW OFTEN DO YOU HAVE SIX OR MORE DRINKS ON ONE OCCASION: 1

## 2025-06-07 ASSESSMENT — PATIENT HEALTH QUESTIONNAIRE - PHQ9
1. LITTLE INTEREST OR PLEASURE IN DOING THINGS: NOT AT ALL
SUM OF ALL RESPONSES TO PHQ QUESTIONS 1-9: 0
2. FEELING DOWN, DEPRESSED OR HOPELESS: NOT AT ALL

## 2025-06-10 ENCOUNTER — OFFICE VISIT (OUTPATIENT)
Facility: CLINIC | Age: 75
End: 2025-06-10
Payer: MEDICARE

## 2025-06-10 VITALS
DIASTOLIC BLOOD PRESSURE: 72 MMHG | SYSTOLIC BLOOD PRESSURE: 124 MMHG | OXYGEN SATURATION: 97 % | TEMPERATURE: 98.2 F | RESPIRATION RATE: 16 BRPM | HEIGHT: 62 IN | WEIGHT: 177.4 LBS | HEART RATE: 71 BPM | BODY MASS INDEX: 32.65 KG/M2

## 2025-06-10 DIAGNOSIS — E03.9 ACQUIRED HYPOTHYROIDISM: ICD-10-CM

## 2025-06-10 DIAGNOSIS — Z00.00 MEDICARE ANNUAL WELLNESS VISIT, SUBSEQUENT: Primary | ICD-10-CM

## 2025-06-10 DIAGNOSIS — E78.2 MIXED HYPERLIPIDEMIA DUE TO TYPE 2 DIABETES MELLITUS (HCC): ICD-10-CM

## 2025-06-10 DIAGNOSIS — E11.69 MIXED HYPERLIPIDEMIA DUE TO TYPE 2 DIABETES MELLITUS (HCC): ICD-10-CM

## 2025-06-10 DIAGNOSIS — I10 ESSENTIAL HYPERTENSION: ICD-10-CM

## 2025-06-10 DIAGNOSIS — N18.32 TYPE 2 DIABETES MELLITUS WITH STAGE 3B CHRONIC KIDNEY DISEASE, WITHOUT LONG-TERM CURRENT USE OF INSULIN (HCC): ICD-10-CM

## 2025-06-10 DIAGNOSIS — E11.22 TYPE 2 DIABETES MELLITUS WITH STAGE 3B CHRONIC KIDNEY DISEASE, WITHOUT LONG-TERM CURRENT USE OF INSULIN (HCC): ICD-10-CM

## 2025-06-10 DIAGNOSIS — E66.811 CLASS 1 OBESITY WITH SERIOUS COMORBIDITY AND BODY MASS INDEX (BMI) OF 32.0 TO 32.9 IN ADULT, UNSPECIFIED OBESITY TYPE: ICD-10-CM

## 2025-06-10 PROCEDURE — 3078F DIAST BP <80 MM HG: CPT | Performed by: NURSE PRACTITIONER

## 2025-06-10 PROCEDURE — 1126F AMNT PAIN NOTED NONE PRSNT: CPT | Performed by: NURSE PRACTITIONER

## 2025-06-10 PROCEDURE — 3046F HEMOGLOBIN A1C LEVEL >9.0%: CPT | Performed by: NURSE PRACTITIONER

## 2025-06-10 PROCEDURE — 1123F ACP DISCUSS/DSCN MKR DOCD: CPT | Performed by: NURSE PRACTITIONER

## 2025-06-10 PROCEDURE — 3074F SYST BP LT 130 MM HG: CPT | Performed by: NURSE PRACTITIONER

## 2025-06-10 PROCEDURE — 3017F COLORECTAL CA SCREEN DOC REV: CPT | Performed by: NURSE PRACTITIONER

## 2025-06-10 PROCEDURE — G0439 PPPS, SUBSEQ VISIT: HCPCS | Performed by: NURSE PRACTITIONER

## 2025-06-10 NOTE — PATIENT INSTRUCTIONS
doctor if you have any problems.  Where can you learn more?  Go to https://[email or web address].net/patientEd and enter F075 to learn more about \"A Healthy Heart: Care Instructions.\"  Current as of: July 31, 2024  Content Version: 14.5  © 2871-5594 [employer].   Care instructions adapted under license by [employer]. If you have questions about a medical condition or this instruction, always ask your healthcare professional. [employer] disclaims any warranty or liability for your use of this information.    Personalized Preventive Plan for Deysi Honeycutt - 6/10/2025  Medicare offers a range of preventive health benefits. Some of the tests and screenings are paid in full while other may be subject to a deductible, co-insurance, and/or copay.  Some of these benefits include a comprehensive review of your medical history including lifestyle, illnesses that may run in your family, and various assessments and screenings as appropriate.  After reviewing your medical record and screening and assessments performed today your provider may have ordered immunizations, labs, imaging, and/or referrals for you.  A list of these orders (if applicable) as well as your Preventive Care list are included within your After Visit Summary for your review.

## 2025-06-10 NOTE — PROGRESS NOTES
Deysi Honeycutt is a 74 y.o. female     Chief Complaint   Patient presents with    Medicare AWV       /72   Pulse 71   Temp 98.2 °F (36.8 °C) (Oral)   Resp 16   Ht 1.575 m (5' 2\")   Wt 80.5 kg (177 lb 6.4 oz)   SpO2 97%   BMI 32.45 kg/m²     Health Maintenance Due   Topic Date Due    DTaP/Tdap/Td vaccine (1 - Tdap) Never done    Diabetic foot exam  01/25/2025    Diabetic Alb to Cr ratio (uACR) test  01/25/2025    COVID-19 Vaccine (8 - 2024-25 season) 05/01/2025    Annual Wellness Visit (Medicare)  05/30/2025         \"Have you been to the ER, urgent care clinic since your last visit?  Hospitalized since your last visit?\"    NO    “Have you seen or consulted any other health care providers outside of [facility] since your last visit?”    NO                     
Services Due: see orders and patient instructions/AVS.  Recommended screening schedule for the next 5-10 years is provided to the patient in written form: see Patient Instructions/AVS.     Reviewed and updated this visit:  Tobacco  Allergies  Med Hx  Sexual Hx          Signed,  José Miguel Ch, MSN APRN FNP-BC

## 2025-06-11 ENCOUNTER — RESULTS FOLLOW-UP (OUTPATIENT)
Facility: CLINIC | Age: 75
End: 2025-06-11

## 2025-06-11 DIAGNOSIS — E11.22 TYPE 2 DIABETES MELLITUS WITH STAGE 3B CHRONIC KIDNEY DISEASE, WITHOUT LONG-TERM CURRENT USE OF INSULIN (HCC): ICD-10-CM

## 2025-06-11 DIAGNOSIS — N18.32 TYPE 2 DIABETES MELLITUS WITH STAGE 3B CHRONIC KIDNEY DISEASE, WITHOUT LONG-TERM CURRENT USE OF INSULIN (HCC): ICD-10-CM

## 2025-06-11 LAB
ALBUMIN SERPL-MCNC: 4 G/DL (ref 3.5–5)
ALBUMIN/GLOB SERPL: 1.4 (ref 1.1–2.2)
ALP SERPL-CCNC: 71 U/L (ref 45–117)
ALT SERPL-CCNC: 20 U/L (ref 12–78)
ANION GAP SERPL CALC-SCNC: 6 MMOL/L (ref 2–12)
AST SERPL-CCNC: 16 U/L (ref 15–37)
BASOPHILS # BLD: 0.03 K/UL (ref 0–0.1)
BASOPHILS NFR BLD: 0.5 % (ref 0–1)
BILIRUB SERPL-MCNC: 0.3 MG/DL (ref 0.2–1)
BUN SERPL-MCNC: 27 MG/DL (ref 6–20)
BUN/CREAT SERPL: 19 (ref 12–20)
CALCIUM SERPL-MCNC: 9.8 MG/DL (ref 8.5–10.1)
CHLORIDE SERPL-SCNC: 108 MMOL/L (ref 97–108)
CHOLEST SERPL-MCNC: 132 MG/DL
CO2 SERPL-SCNC: 27 MMOL/L (ref 21–32)
COMMENT:: NORMAL
CREAT SERPL-MCNC: 1.44 MG/DL (ref 0.55–1.02)
CREAT UR-MCNC: 57.6 MG/DL
DIFFERENTIAL METHOD BLD: ABNORMAL
EOSINOPHIL # BLD: 0.2 K/UL (ref 0–0.4)
EOSINOPHIL NFR BLD: 3.2 % (ref 0–7)
ERYTHROCYTE [DISTWIDTH] IN BLOOD BY AUTOMATED COUNT: 13.1 % (ref 11.5–14.5)
EST. AVERAGE GLUCOSE BLD GHB EST-MCNC: 151 MG/DL
FERRITIN SERPL-MCNC: 121 NG/ML (ref 8–252)
GLOBULIN SER CALC-MCNC: 2.8 G/DL (ref 2–4)
GLUCOSE SERPL-MCNC: 138 MG/DL (ref 65–100)
HBA1C MFR BLD: 6.9 % (ref 4–5.6)
HCT VFR BLD AUTO: 36.2 % (ref 35–47)
HDLC SERPL-MCNC: 42 MG/DL
HDLC SERPL: 3.1 (ref 0–5)
HGB BLD-MCNC: 11.2 G/DL (ref 11.5–16)
IMM GRANULOCYTES # BLD AUTO: 0.04 K/UL (ref 0–0.04)
IMM GRANULOCYTES NFR BLD AUTO: 0.6 % (ref 0–0.5)
IRON SATN MFR SERPL: 19 % (ref 20–50)
IRON SERPL-MCNC: 64 UG/DL (ref 35–150)
LDLC SERPL CALC-MCNC: 60.8 MG/DL (ref 0–100)
LYMPHOCYTES # BLD: 1.59 K/UL (ref 0.8–3.5)
LYMPHOCYTES NFR BLD: 25.2 % (ref 12–49)
MCH RBC QN AUTO: 30.1 PG (ref 26–34)
MCHC RBC AUTO-ENTMCNC: 30.9 G/DL (ref 30–36.5)
MCV RBC AUTO: 97.3 FL (ref 80–99)
MICROALBUMIN UR-MCNC: 1.94 MG/DL
MICROALBUMIN/CREAT UR-RTO: 34 MG/G (ref 0–30)
MONOCYTES # BLD: 0.52 K/UL (ref 0–1)
MONOCYTES NFR BLD: 8.2 % (ref 5–13)
NEUTS SEG # BLD: 3.94 K/UL (ref 1.8–8)
NEUTS SEG NFR BLD: 62.3 % (ref 32–75)
NRBC # BLD: 0 K/UL (ref 0–0.01)
NRBC BLD-RTO: 0 PER 100 WBC
PLATELET # BLD AUTO: 213 K/UL (ref 150–400)
PMV BLD AUTO: 11.2 FL (ref 8.9–12.9)
POTASSIUM SERPL-SCNC: 4.8 MMOL/L (ref 3.5–5.1)
PROT SERPL-MCNC: 6.8 G/DL (ref 6.4–8.2)
RBC # BLD AUTO: 3.72 M/UL (ref 3.8–5.2)
SODIUM SERPL-SCNC: 141 MMOL/L (ref 136–145)
SPECIMEN HOLD: NORMAL
T4 FREE SERPL-MCNC: 1.5 NG/DL (ref 0.8–1.5)
TIBC SERPL-MCNC: 339 UG/DL (ref 250–450)
TRIGL SERPL-MCNC: 146 MG/DL
TSH SERPL DL<=0.05 MIU/L-ACNC: 0.27 UIU/ML (ref 0.36–3.74)
VLDLC SERPL CALC-MCNC: 29.2 MG/DL
WBC # BLD AUTO: 6.3 K/UL (ref 3.6–11)

## 2025-07-01 DIAGNOSIS — E78.2 MIXED HYPERLIPIDEMIA DUE TO TYPE 2 DIABETES MELLITUS (HCC): ICD-10-CM

## 2025-07-01 DIAGNOSIS — I10 ESSENTIAL HYPERTENSION: ICD-10-CM

## 2025-07-01 DIAGNOSIS — E03.9 ACQUIRED HYPOTHYROIDISM: ICD-10-CM

## 2025-07-01 DIAGNOSIS — E11.69 MIXED HYPERLIPIDEMIA DUE TO TYPE 2 DIABETES MELLITUS (HCC): ICD-10-CM

## 2025-07-01 RX ORDER — METFORMIN HYDROCHLORIDE 500 MG/1
500 TABLET, EXTENDED RELEASE ORAL 2 TIMES DAILY WITH MEALS
Qty: 180 TABLET | Refills: 1 | Status: SHIPPED | OUTPATIENT
Start: 2025-07-01

## 2025-07-01 RX ORDER — LEVOTHYROXINE SODIUM 88 UG/1
TABLET ORAL
Qty: 90 TABLET | Refills: 1 | Status: SHIPPED | OUTPATIENT
Start: 2025-07-01

## 2025-07-01 RX ORDER — LOSARTAN POTASSIUM 100 MG/1
100 TABLET ORAL DAILY
Qty: 90 TABLET | Refills: 1 | Status: SHIPPED | OUTPATIENT
Start: 2025-07-01

## 2025-07-01 RX ORDER — AMLODIPINE BESYLATE 2.5 MG/1
2.5 TABLET ORAL DAILY
Qty: 90 TABLET | Refills: 1 | Status: SHIPPED | OUTPATIENT
Start: 2025-07-01

## 2025-07-09 NOTE — PROGRESS NOTES
----- Message from REY Swenson CNP sent at 7/8/2025  5:55 PM EDT -----  Please let patient know echo looked ok. Although atrial septal aneurysm is present as discussed in the office there was no evidence of interatrial shunt during study which is good news. Please continue current treatment and follow up. Please call with questions and concerns. Thank you    Subjective:  
Lucio Luciano is a 76 y.o. female Chief Complaint Patient presents with  Diabetes  Hypertension  Cholesterol Problem History of present illness:  Ms. Manuela Noble returns in follow up. She has done well since last visit. She feels her blood sugars have been stable. She is continuing her usual medications. She has had no difficulties with that and her blood pressure today is good. Weight is stable. She denies issues with chest pain or shortness of breath, increasing lower extremity edema. There is no abdominal complaints and she remains stable from her gastric sleeve operation. She denies any current  symptoms. She brings with her request from Dr. Danial Knight for multiple labs related to her gastric sleeve procedure and we sent those through. She is a bit early for A1c so she will come back in two weeks' time just for that. She will follow up in four months' time otherwise. Patient Active Problem List  
 Diagnosis Date Noted  HTN (hypertension) 10/18/2017 Priority: 1 - One  Type 2 diabetes mellitus without complication, without long-term current use of insulin ([address] Priority: 1 - One  
 Hyperlipidemia LDL goal <100 Priority: 1 - One  Coronary artery calcification 11/16/2018 Priority: 2 - Two  
 ASHD (arteriosclerotic heart disease) 10/19/2018 Priority: 2 - Two  Anemia in chronic kidney disease 10/18/2017 Priority: 2 - Two  Acquired hypothyroidism Priority: 2 - Two  Long-term current use of high risk medication other than anticoagulant 04/17/2019 Priority: 3 - Three  Eczema 10/18/2017 Priority: 3 - Three  Osteoporosis 10/18/2017 Priority: 3 - Three  History of cholecystectomy 10/18/2017 Priority: 4 - Four  Vitamin D deficiency Priority: 4 - Four  Carcinoma ([address] 10/18/2017 Priority: 5 - Five  S/P gastric bypass 04/18/2019  Abnormal Heart Score CT 10/19/2018  Abnormal kidney function 10/18/2017  Abnormal presence of protein in urine 10/18/2017  Annual physical exam 10/18/2017 Past Surgical History:  
Procedure Laterality Date  COLONOSCOPY N/A 4/10/2018 COLONOSCOPY performed by Laila Schrader [facility] ENDOSCOPY  DELIVERY     
 x2  
 HX CHOLECYSTECTOMY  HX OTHER SURGICAL  3/10/16  
 gastric sleeve  HX POLYPECTOMY  HX TONSILLECTOMY Allergies Allergen Reactions  Niacin Other (comments) Skin irritation  Sulfa (Sulfonamide Antibiotics) Unknown (comments)  Tricor [Fenofibrate Micronized] Swelling Family History Problem Relation Age of Onset  Diabetes Father  Stroke Father  Cancer Mother   
     lung  Other Mother   
     aortic aneurysm Social History Socioeconomic History  Marital status:  Spouse name: Not on file  Number of children: Not on file  Years of education: Not on file  Highest education level: Not on file Occupational History  Not on file Social Needs  Financial resource strain: Not on file  Food insecurity:  
  Worry: Not on file Inability: Not on file  Transportation needs:  
  Medical: Not on file Non-medical: Not on file Tobacco Use  Smoking status: Never Smoker  Smokeless tobacco: Never Used Substance and Sexual Activity  Alcohol use: Yes Comment: maybe a few times a year at most  
 Drug use: No  
 Sexual activity: Not on file Lifestyle  Physical activity:  
  Days per week: Not on file Minutes per session: Not on file  Stress: Not on file Relationships  Social connections:  
  Talks on phone: Not on file Gets together: Not on file Attends Tenriism service: Not on file Active member of club or organization: Not on file Attends meetings of clubs or organizations: Not on file Relationship status: Not on file  Intimate partner violence: Fear of current or ex partner: Not on file Emotionally abused: Not on file Physically abused: Not on file Forced sexual activity: Not on file Other Topics Concern  Not on file Social History Narrative Lives in [place] with  of 44 years. Has one daughter and one son. Retired   at [employer]. Likes to read and play on the computer. Outpatient Medications Marked as Taking for the 4/18/19 encounter (Office Visit) with Laurine Ahumada, MD  
Medication Sig Dispense Refill  levothyroxine (SYNTHROID) 88 mcg tablet Take 1 Tab by mouth Daily (before breakfast). 90 Tab 3  
 b complex vitamins tablet Take 1 Tab by mouth daily.  losartan (COZAAR) 100 mg tablet TAKE 1 TABLET DAILY 90 Tab 3  
 gabapentin (NEURONTIN) 300 mg capsule TAKE 1 CAPSULE TWICE A DAY (Patient taking differently: TAKE 1 CAPSULE daily) 180 Cap 4  
 rosuvastatin (CRESTOR) 20 mg tablet TAKE 1 TABLET DAILY 90 Tab 3  
 calcium citrate-vitamin D3 (CITRACAL + D) tablet Take  by mouth two (2) times a day.  B.infantis-B.ani-B.long-B.bifi (PROBIOTIC 4X) 10-15 mg TbEC Take  by mouth.  BIOTIN PO Take 5,000 mcg by mouth daily.  silver sulfADIAZINE (SILVADENE) 1 % topical cream Apply  to affected area daily.  MULTIVITAMIN/IRON/FOLIC ACID (CENTRUM COMPLETE PO) Take  by mouth daily.  ACCU-CHEK LEILA PLUS TEST STRP strip USE TO TEST TWICE DAILY 100 Strip 11  
 fluticasone (FLONASE) 50 mcg/actuation nasal spray nightly.  CETIRIZINE HCL (ZYRTEC PO) Take 10 mg by mouth daily. Review of Systems Constitutional:  She denies fever, weight loss, sweats or fatigue. HEENT:  No blurred or double vision, headache or dizziness. No difficulty with swallowing, taste, speech or smell. Respiratory:  No cough, wheezing or shortness of breath. No sputum production.  
Cardiac:  Denies chest pain, palpitations, unexplained indigestion, syncope, edema, PND or orthopnea. GI:  No changes in bowel movements, no abdominal pain, no bloating, anorexia, nausea, vomiting or heartburn. :  No frequency or dysuria. Denies incontinence. Extremities:  No joint pain, stiffness or swelling. Skin:  No recent rashes or mole changes. Neurological:  No numbness, tingling, burning paresthesias or loss of motor strength. No syncope, dizziness, frequent headaches or memory loss. Objective:  
 
Vitals:  
 04/18/19 0911 BP: 138/83 Pulse: 73 Temp: 98 °F (36.7 °C) TempSrc: Oral  
SpO2: 98% Weight: 180 lb 6.4 oz (81.8 kg) Height: 5' 0.25\" (1.53 m) PainSc:   0 - No pain Body mass index is 34.94 kg/m². Physical Examination:  
           General Appearance:  Well-developed, well-nourished, no acute  distress. HEENT:   
 Ears:  The TMs and ear canals were clear. Eyes:  The pupillary responses were normal.  Extraocular muscle function intact. Lids and conjunctiva not injected. Neck:  Supple without thyromegaly or adenopathy. No JVD noted. Lungs:  Clear to auscultation and percussion. Cardiac:  Regular rate and rhythm without murmur. GI: nontender w/o mass. Normal BS's. Extremities:  No clubbing, cyanosis or edema. Skin:  No rash or unusual mole changes noted. Neurological:  Grossly normal.     
 
 
 
Assessment/Plan:  
Impressions: ICD-10-CM ICD-9-CM 1. Type 2 diabetes mellitus without complication, without long-term current use of insulin ([facility]) F45.9 434.90 METABOLIC PANEL, COMPREHENSIVE 2. Hyperlipidemia LDL goal <100 G65.2 998.4 METABOLIC PANEL, COMPREHENSIVE 3. Essential hypertension G79 401.0 METABOLIC PANEL, COMPREHENSIVE 4. Anemia in stage 3 chronic kidney disease ([facility]) N18.3 285.21 IRON PROFILE  
 D63.1 585.3 CBC WITH AUTOMATED DIFF FERRITIN  
   COPPER  
   ZINC  
   VITAMIN B1, WHOLE BLOOD  
   VITAMIN B12  
   FOLATE 5.  S/P gastric bypass Z98.84 V45.86 IRON PROFILE  
 TSH 3RD GENERATION FERRITIN  
   PREALBUMIN  
   PTH INTACT  
   VITAMIN D, 25 HYDROXY  
   COPPER  
   ZINC  
   VITAMIN A  
   VITAMIN B1, WHOLE BLOOD  
   VITAMIN B12  
   FOLATE 6. Acquired hypothyroidism E03.9 244.9 TSH 3RD GENERATION  
   PTH INTACT Plan: 1. Continue present meds 2. Discussed lifestyle modifications including Na restriction, low carb/fat diet, weight reduction and exercise (at least a walking program). Follow-up and Dispositions · Return in about 4 months (around 8/18/2019). Orders Placed This Encounter  IRON PROFILE ([facility])  CBC WITH AUTOMATED DIFF ([facility])  METABOLIC PANEL, COMPREHENSIVE ([facility])  TSH 3RD GENERATION ([facility])  FERRITIN  
 PREALBUMIN  
 PTH INTACT  VITAMIN D, 25 HYDROXY ([facility]) [address] [facility]  VITAMIN A  
 VITAMIN B1, WHOLE BLOOD  VITAMIN B12  
 FOLATE Tila Hearn MD

## (undated) DEVICE — TOWEL 4 PLY TISS 19X30 SUE WHT

## (undated) DEVICE — SNARE ENDOSCP L240CM LOOP W27MM SHTH DIA2.4MM WRK CHN 2.8MM

## (undated) DEVICE — BASIN EMSIS 16OZ GRAPHITE PLAS KID SHP MOLD GRAD FOR ORAL

## (undated) DEVICE — SET GRAV CK VLV NEEDLESS ST 3 GANGED 4WAY STPCOCK HI FLO 10

## (undated) DEVICE — 1200 GUARD II KIT W/5MM TUBE W/O VAC TUBE: Brand: GUARDIAN

## (undated) DEVICE — KENDALL RADIOLUCENT FOAM MONITORING ELECTRODE RECTANGULAR SHAPE: Brand: KENDALL

## (undated) DEVICE — SET ADMIN 16ML TBNG L100IN 2 Y INJ SITE IV PIGGY BK DISP

## (undated) DEVICE — NEONATAL-ADULT SPO2 SENSOR: Brand: NELLCOR

## (undated) DEVICE — ENDOSCOPIC KIT COMPLIANCE ENDOKIT

## (undated) DEVICE — CUFF BLD PRSS AD CLTH SGL TB W/ BAYNT CONN ROUNDED CORNER

## (undated) DEVICE — ELECTRODE PT RET AD L9FT HI MOIST COND ADH HYDRGEL CORDED

## (undated) DEVICE — SOLIDIFIER MEDC 1200ML -- CONVERT TO 356117

## (undated) DEVICE — NEEDLE HYPO 18GA L1.5IN PNK S STL HUB POLYPR SHLD REG BVL

## (undated) DEVICE — Device

## (undated) DEVICE — Z DISCONTINUED PER MEDLINE LINE GAS SAMPLING O2/CO2 LNG AD 13 FT NSL W/ TBNG FILTERLINE

## (undated) DEVICE — SYR 3ML LL TIP 1/10ML GRAD --

## (undated) DEVICE — MEDI-VAC YANK SUCT HNDL W/TPRD BULBOUS TIP & NON-CONDUCTIVE: Brand: CARDINAL HEALTH

## (undated) DEVICE — CONTAINER SPEC 20 ML LID NEUT BUFF FORMALIN 10 % POLYPR STS

## (undated) DEVICE — CATH IV AUTOGRD BC PNK 20GA 25 -- INSYTE

## (undated) DEVICE — IV START KIT: Brand: MEDLINE

## (undated) DEVICE — TRAP ENDOSCP POLYP 2 CHMBR DRAWER TYP

## (undated) DEVICE — SYR 10ML LUER LOK 1/5ML GRAD --